# Patient Record
Sex: FEMALE | Race: ASIAN | NOT HISPANIC OR LATINO
[De-identification: names, ages, dates, MRNs, and addresses within clinical notes are randomized per-mention and may not be internally consistent; named-entity substitution may affect disease eponyms.]

---

## 2017-03-22 ENCOUNTER — APPOINTMENT (OUTPATIENT)
Dept: UROLOGY | Facility: CLINIC | Age: 57
End: 2017-03-22

## 2017-04-21 ENCOUNTER — APPOINTMENT (OUTPATIENT)
Dept: ULTRASOUND IMAGING | Facility: IMAGING CENTER | Age: 57
End: 2017-04-21

## 2017-04-21 ENCOUNTER — APPOINTMENT (OUTPATIENT)
Dept: UROLOGY | Facility: CLINIC | Age: 57
End: 2017-04-21

## 2017-04-21 VITALS
DIASTOLIC BLOOD PRESSURE: 80 MMHG | BODY MASS INDEX: 33.38 KG/M2 | RESPIRATION RATE: 17 BRPM | SYSTOLIC BLOOD PRESSURE: 128 MMHG | WEIGHT: 170 LBS | HEART RATE: 83 BPM | TEMPERATURE: 98.3 F | HEIGHT: 60 IN

## 2017-09-29 ENCOUNTER — EMERGENCY (EMERGENCY)
Facility: HOSPITAL | Age: 57
LOS: 1 days | Discharge: HOME | End: 2017-09-29
Attending: EMERGENCY MEDICINE | Admitting: EMERGENCY MEDICINE
Payer: COMMERCIAL

## 2017-09-29 VITALS
HEART RATE: 119 BPM | WEIGHT: 158.07 LBS | OXYGEN SATURATION: 99 % | TEMPERATURE: 100 F | HEIGHT: 66 IN | SYSTOLIC BLOOD PRESSURE: 158 MMHG | RESPIRATION RATE: 20 BRPM | DIASTOLIC BLOOD PRESSURE: 75 MMHG

## 2017-09-29 VITALS
OXYGEN SATURATION: 99 % | DIASTOLIC BLOOD PRESSURE: 69 MMHG | SYSTOLIC BLOOD PRESSURE: 122 MMHG | TEMPERATURE: 98 F | HEART RATE: 81 BPM | RESPIRATION RATE: 16 BRPM

## 2017-09-29 DIAGNOSIS — Z98.51 TUBAL LIGATION STATUS: Chronic | ICD-10-CM

## 2017-09-29 DIAGNOSIS — Z98.89 OTHER SPECIFIED POSTPROCEDURAL STATES: Chronic | ICD-10-CM

## 2017-09-29 LAB
ALBUMIN SERPL ELPH-MCNC: 4.3 G/DL — SIGNIFICANT CHANGE UP (ref 3.3–5)
ALP SERPL-CCNC: 81 U/L — SIGNIFICANT CHANGE UP (ref 30–120)
ALT FLD-CCNC: 29 U/L DA — SIGNIFICANT CHANGE UP (ref 10–60)
ANION GAP SERPL CALC-SCNC: 12 MMOL/L — SIGNIFICANT CHANGE UP (ref 5–17)
APPEARANCE UR: CLEAR — SIGNIFICANT CHANGE UP
AST SERPL-CCNC: 25 U/L — SIGNIFICANT CHANGE UP (ref 10–40)
BASOPHILS # BLD AUTO: 0.1 K/UL — SIGNIFICANT CHANGE UP (ref 0–0.2)
BASOPHILS NFR BLD AUTO: 1.2 % — SIGNIFICANT CHANGE UP (ref 0–2)
BILIRUB SERPL-MCNC: 0.3 MG/DL — SIGNIFICANT CHANGE UP (ref 0.2–1.2)
BILIRUB UR-MCNC: NEGATIVE — SIGNIFICANT CHANGE UP
BUN SERPL-MCNC: 9 MG/DL — SIGNIFICANT CHANGE UP (ref 7–23)
CALCIUM SERPL-MCNC: 10.2 MG/DL — SIGNIFICANT CHANGE UP (ref 8.4–10.5)
CHLORIDE SERPL-SCNC: 98 MMOL/L — SIGNIFICANT CHANGE UP (ref 96–108)
CK MB BLD-MCNC: 2.2 % — SIGNIFICANT CHANGE UP (ref 0–3.5)
CK MB BLD-MCNC: 2.4 % — SIGNIFICANT CHANGE UP (ref 0–3.5)
CK MB CFR SERPL CALC: 2.6 NG/ML — SIGNIFICANT CHANGE UP (ref 0–3.6)
CK MB CFR SERPL CALC: 4 NG/ML — HIGH (ref 0–3.6)
CK SERPL-CCNC: 117 U/L — SIGNIFICANT CHANGE UP (ref 26–192)
CK SERPL-CCNC: 170 U/L — SIGNIFICANT CHANGE UP (ref 26–192)
CO2 SERPL-SCNC: 26 MMOL/L — SIGNIFICANT CHANGE UP (ref 22–31)
COLOR SPEC: YELLOW — SIGNIFICANT CHANGE UP
CREAT SERPL-MCNC: 0.75 MG/DL — SIGNIFICANT CHANGE UP (ref 0.5–1.3)
D DIMER BLD IA.RAPID-MCNC: <150 NG/ML DDU — SIGNIFICANT CHANGE UP
DIFF PNL FLD: NEGATIVE — SIGNIFICANT CHANGE UP
EOSINOPHIL # BLD AUTO: 0.6 K/UL — HIGH (ref 0–0.5)
EOSINOPHIL NFR BLD AUTO: 4.8 % — SIGNIFICANT CHANGE UP (ref 0–6)
ERYTHROCYTE [SEDIMENTATION RATE] IN BLOOD: 13 MM/HR — SIGNIFICANT CHANGE UP (ref 0–20)
FLUAV SPEC QL CULT: NEGATIVE — SIGNIFICANT CHANGE UP
FLUBV AG SPEC QL IA: NEGATIVE — SIGNIFICANT CHANGE UP
GLUCOSE SERPL-MCNC: 161 MG/DL — HIGH (ref 70–99)
GLUCOSE UR QL: NEGATIVE MG/DL — SIGNIFICANT CHANGE UP
HCT VFR BLD CALC: 41.6 % — SIGNIFICANT CHANGE UP (ref 34.5–45)
HGB BLD-MCNC: 13.7 G/DL — SIGNIFICANT CHANGE UP (ref 11.5–15.5)
KETONES UR-MCNC: NEGATIVE — SIGNIFICANT CHANGE UP
LACTATE SERPL-SCNC: 1.8 MMOL/L — SIGNIFICANT CHANGE UP (ref 0.7–2)
LEUKOCYTE ESTERASE UR-ACNC: NEGATIVE — SIGNIFICANT CHANGE UP
LYMPHOCYTES # BLD AUTO: 2.5 K/UL — SIGNIFICANT CHANGE UP (ref 1–3.3)
LYMPHOCYTES # BLD AUTO: 20.6 % — SIGNIFICANT CHANGE UP (ref 13–44)
MCHC RBC-ENTMCNC: 28.9 PG — SIGNIFICANT CHANGE UP (ref 27–34)
MCHC RBC-ENTMCNC: 33 GM/DL — SIGNIFICANT CHANGE UP (ref 32–36)
MCV RBC AUTO: 87.7 FL — SIGNIFICANT CHANGE UP (ref 80–100)
MONOCYTES # BLD AUTO: 0.7 K/UL — SIGNIFICANT CHANGE UP (ref 0–0.9)
MONOCYTES NFR BLD AUTO: 5.9 % — SIGNIFICANT CHANGE UP (ref 2–14)
NEUTROPHILS # BLD AUTO: 8.3 K/UL — HIGH (ref 1.8–7.4)
NEUTROPHILS NFR BLD AUTO: 67.5 % — SIGNIFICANT CHANGE UP (ref 43–77)
NITRITE UR-MCNC: NEGATIVE — SIGNIFICANT CHANGE UP
PH UR: 7 — SIGNIFICANT CHANGE UP (ref 5–8)
PLATELET # BLD AUTO: 321 K/UL — SIGNIFICANT CHANGE UP (ref 150–400)
POTASSIUM SERPL-MCNC: 3.4 MMOL/L — LOW (ref 3.5–5.3)
POTASSIUM SERPL-SCNC: 3.4 MMOL/L — LOW (ref 3.5–5.3)
PROT SERPL-MCNC: 8.2 G/DL — SIGNIFICANT CHANGE UP (ref 6–8.3)
PROT UR-MCNC: NEGATIVE MG/DL — SIGNIFICANT CHANGE UP
RBC # BLD: 4.75 M/UL — SIGNIFICANT CHANGE UP (ref 3.8–5.2)
RBC # FLD: 12.6 % — SIGNIFICANT CHANGE UP (ref 10.3–14.5)
SODIUM SERPL-SCNC: 136 MMOL/L — SIGNIFICANT CHANGE UP (ref 135–145)
SP GR SPEC: 1 — LOW (ref 1.01–1.02)
TROPONIN I SERPL-MCNC: 0 NG/ML — LOW (ref 0.02–0.06)
TROPONIN I SERPL-MCNC: 0 NG/ML — LOW (ref 0.02–0.06)
UROBILINOGEN FLD QL: NEGATIVE MG/DL — SIGNIFICANT CHANGE UP
WBC # BLD: 12.4 K/UL — HIGH (ref 3.8–10.5)
WBC # FLD AUTO: 12.4 K/UL — HIGH (ref 3.8–10.5)

## 2017-09-29 PROCEDURE — 87486 CHLMYD PNEUM DNA AMP PROBE: CPT

## 2017-09-29 PROCEDURE — 93005 ELECTROCARDIOGRAM TRACING: CPT

## 2017-09-29 PROCEDURE — 82553 CREATINE MB FRACTION: CPT

## 2017-09-29 PROCEDURE — 85652 RBC SED RATE AUTOMATED: CPT

## 2017-09-29 PROCEDURE — 87400 INFLUENZA A/B EACH AG IA: CPT

## 2017-09-29 PROCEDURE — 85027 COMPLETE CBC AUTOMATED: CPT

## 2017-09-29 PROCEDURE — 87040 BLOOD CULTURE FOR BACTERIA: CPT

## 2017-09-29 PROCEDURE — 99285 EMERGENCY DEPT VISIT HI MDM: CPT

## 2017-09-29 PROCEDURE — 85379 FIBRIN DEGRADATION QUANT: CPT

## 2017-09-29 PROCEDURE — 87186 SC STD MICRODIL/AGAR DIL: CPT

## 2017-09-29 PROCEDURE — 84484 ASSAY OF TROPONIN QUANT: CPT

## 2017-09-29 PROCEDURE — 93010 ELECTROCARDIOGRAM REPORT: CPT

## 2017-09-29 PROCEDURE — 87798 DETECT AGENT NOS DNA AMP: CPT

## 2017-09-29 PROCEDURE — 99284 EMERGENCY DEPT VISIT MOD MDM: CPT | Mod: 25

## 2017-09-29 PROCEDURE — 87633 RESP VIRUS 12-25 TARGETS: CPT

## 2017-09-29 PROCEDURE — 71020: CPT | Mod: 26

## 2017-09-29 PROCEDURE — 87086 URINE CULTURE/COLONY COUNT: CPT

## 2017-09-29 PROCEDURE — 83605 ASSAY OF LACTIC ACID: CPT

## 2017-09-29 PROCEDURE — 80053 COMPREHEN METABOLIC PANEL: CPT

## 2017-09-29 PROCEDURE — 87581 M.PNEUMON DNA AMP PROBE: CPT

## 2017-09-29 PROCEDURE — 81003 URINALYSIS AUTO W/O SCOPE: CPT

## 2017-09-29 PROCEDURE — 71046 X-RAY EXAM CHEST 2 VIEWS: CPT

## 2017-09-29 PROCEDURE — 82550 ASSAY OF CK (CPK): CPT

## 2017-09-29 RX ORDER — SODIUM CHLORIDE 9 MG/ML
1000 INJECTION INTRAMUSCULAR; INTRAVENOUS; SUBCUTANEOUS ONCE
Qty: 0 | Refills: 0 | Status: COMPLETED | OUTPATIENT
Start: 2017-09-29 | End: 2017-09-29

## 2017-09-29 RX ORDER — SODIUM CHLORIDE 9 MG/ML
3 INJECTION INTRAMUSCULAR; INTRAVENOUS; SUBCUTANEOUS ONCE
Qty: 0 | Refills: 0 | Status: COMPLETED | OUTPATIENT
Start: 2017-09-29 | End: 2017-09-29

## 2017-09-29 RX ORDER — ACETAMINOPHEN 500 MG
650 TABLET ORAL ONCE
Qty: 0 | Refills: 0 | Status: COMPLETED | OUTPATIENT
Start: 2017-09-29 | End: 2017-09-29

## 2017-09-29 RX ADMIN — Medication 650 MILLIGRAM(S): at 17:59

## 2017-09-29 RX ADMIN — SODIUM CHLORIDE 1000 MILLILITER(S): 9 INJECTION INTRAMUSCULAR; INTRAVENOUS; SUBCUTANEOUS at 18:03

## 2017-09-29 RX ADMIN — SODIUM CHLORIDE 3 MILLILITER(S): 9 INJECTION INTRAMUSCULAR; INTRAVENOUS; SUBCUTANEOUS at 16:41

## 2017-09-29 RX ADMIN — SODIUM CHLORIDE 1000 MILLILITER(S): 9 INJECTION INTRAMUSCULAR; INTRAVENOUS; SUBCUTANEOUS at 19:04

## 2017-09-29 NOTE — ED ADULT NURSE REASSESSMENT NOTE - NS ED NURSE REASSESS COMMENT FT1
Pt is awake, alert and conversant with clear speech, reports feeling better since in the ED. Pt is on CM in NSR, NS is infusing. Pt is awaiting cards consult and remaining lab work. Pt is breathing without any difficulty, respirations are even and unlabored, lungs are CTA, HR is regular and without ectopy, +peripheral pulses, no edema, skin is hot to touch, abdomen is obese and soft, +BS.

## 2017-09-29 NOTE — ED PROVIDER NOTE - PROGRESS NOTE DETAILS
Pt seen by Dr. Land, cardiologist in ED who advised to get second set of cardiac markers at 9pm and re-assess; if neg, d/c as viral pathology.   Pt was examined by ED attending, Dr. Galeano who agreed with plan. Pt seen by Dr. Land, cardiologist in ED who advised to get second set of cardiac markers at 9pm and re-assess; if neg, d/c as viral SYNDOME.   Pt was examined by ED attending, Dr. Galeano who agreed with plan. Pt stable and labs/imaging wnl in ED. both sets trops neg, ddimer and cxr wnl. Cardiology cleared pt. Will d/c home and f/u cardiology and pmd.

## 2017-09-29 NOTE — ED PROVIDER NOTE - PMH
Asthma    Calculus of kidney    DM (diabetes mellitus)    GERD (gastroesophageal reflux disease)    Herpes simplex    HLD (hyperlipidemia)    Hydronephrosis  right  Hydronephrosis, unspecified hydronephrosis type    Hypertension    Impaired fasting glucose    Thyroid activity decreased

## 2017-09-29 NOTE — CONSULT NOTE ADULT - ASSESSMENT
The patient is a 57 year old female with a history of HTN, DM who presents with atypical chest pain.    Plan:   - ECG with no evidence of ischemia/infarction. Low voltage likely secondary to body habitus (old finding on ECG).  - First troponin 0.000. Rule out acute MI with two sets of cardiac enzymes  - D-dimer negative  - CXR clear  - If above work-up negative, patient may be discharged from cardiac perspective and follow-up for visit

## 2017-09-29 NOTE — ED ADULT NURSE NOTE - CHPI ED SYMPTOMS NEG
no nausea/no chills/no back pain/no shortness of breath/no diaphoresis/no syncope/no vomiting/no chest pain/no cough/no fever/no dizziness

## 2017-09-29 NOTE — ED PROVIDER NOTE - ATTENDING CONTRIBUTION TO CARE
pt is a 57 f whose pmd is dr guerita booker has hx of htn obesity kidney surgery. she isneover smoker drinker has hx of intermittent chest pain and prssure now more steady over past 3 days worst this am.. pt bib ems after daughter arrived home from work this afternoon. pain pressure tightness in left center of chest non radiating no sob no no ausea no cough no trauma  no travel no calf pain or swelling  exam reveals obese f nad normal heart lungs ext abd chest back skin neuro adeel  ro pe ro pn ro acs or othe rinfectious in febrile pt with tachycardia

## 2017-09-29 NOTE — CONSULT NOTE ADULT - SUBJECTIVE AND OBJECTIVE BOX
History of Present Illness: The patient is a 57 year old female with a history of HTN, DM who presents with chest pain starting at 2 pm. Pain is left-sided, tightness, non-radiating, non-pleuritic, non-exertional. There is associated shortness of breath and some chills earlier. No cough. She had similar symptoms about 3 years ago and 1 year ago (which was enterovirus positive at that time). She had a nuclear stress test three years ago, which was reportedly normal. Echocardiogram last year with normal LV systolic function, no significant valve issues.    Past Medical/Surgical History:  HTN, DM    Medications:  Amlodipine  HCTZ-triamterene  Metoprolol succinate  Aspirin    Family History: Father - MI    Social History: No tobacco, alcohol or drug use    Review of Systems:  General: No fevers, chills, weight loss or gain  Skin: No rashes, color changes  Cardiovascular: No chest pain, orthopnea  Respiratory: No shortness of breath, cough  Gastrointestinal: No nausea, abdominal pain  Genitourinary: No incontinence, pain with urination  Musculoskeletal: No pain, swelling, decreased range of motion  Neurological: No headache, weakness  Psychiatric: No depression, anxiety  Endocrine: No weight loss or gain, increased thirst  All other systems are comprehensively negative.    Physical Exam:  Vitals:        Vital Signs Last 24 Hrs  T(C): 36.9 (29 Sep 2017 19:07), Max: 37.9 (29 Sep 2017 16:23)  T(F): 98.5 (29 Sep 2017 19:07), Max: 100.2 (29 Sep 2017 16:23)  HR: 80 (29 Sep 2017 19:07) (80 - 119)  BP: 120/64 (29 Sep 2017 19:07) (120/64 - 158/75)  BP(mean): --  RR: 16 (29 Sep 2017 18:04) (16 - 20)  SpO2: 98% (29 Sep 2017 19:07) (98% - 99%)  General: NAD  HEENT: MMM  Neck: No JVD, no carotid bruit  Lungs: CTAB  CV: RRR, nl S1/S2, no M/R/G  Abdomen: S/NT/ND, +BS  Extremities: No LE edema, no cyanosis  Neuro: AAOx3, non-focal  Skin: No rash    Labs:                        13.7   12.4  )-----------( 321      ( 29 Sep 2017 17:00 )             41.6     09-29    136  |  98  |  9   ----------------------------<  161<H>  3.4<L>   |  26  |  0.75    Ca    10.2      29 Sep 2017 17:00    TPro  8.2  /  Alb  4.3  /  TBili  0.3  /  DBili  x   /  AST  25  /  ALT  29  /  AlkPhos  81  09-29    CARDIAC MARKERS ( 29 Sep 2017 17:00 )  .000 ng/mL / x     / x     / x     / x          ECG: Sinus tachycardia, normal axis, low voltage (largely unchanged from prior)

## 2017-09-29 NOTE — ED PROVIDER NOTE - OBJECTIVE STATEMENT
56 y/o F with hx of HTN, DM presents with c/o L side chest pressure x 4 days. Pt states that symptoms are worsening today. Pt states that she was given nitro 2 sprays and 4 tabs of aspirin via EMS on arrival. Denies fever, chills, n/v/d, cough, runny nose, SOB, abd pain, back pain, recent travel/immobilization/surgeries, palpitations, leg pain/swelling, dizziness, smoking, cocaine use, or other symptoms. 56 y/o F with hx of HTN, DM presents with c/o L side chest pressure x 4 days. Pt states that symptoms are worsening today. Pt states that she was given nitro 2 sprays and 4 tabs of aspirin via EMS on arrival. States that she had some associated shortness of breath and chills earlier. Denies pleuritic or exertional pain. Denies fever,  n/v/d, cough, runny nose, abd pain, back pain, recent travel/immobilization/surgeries, palpitations, leg pain/swelling, dizziness, smoking, cocaine use, or other symptoms.

## 2017-09-29 NOTE — ED PROVIDER NOTE - CARE PLAN
Principal Discharge DX:	Chest pain Principal Discharge DX:	Chest pain  Secondary Diagnosis:	Viral syndrome

## 2017-09-30 LAB — RAPID RVP RESULT: SIGNIFICANT CHANGE UP

## 2017-10-01 LAB
-  AMPICILLIN: SIGNIFICANT CHANGE UP
-  CIPROFLOXACIN: SIGNIFICANT CHANGE UP
-  NITROFURANTOIN: SIGNIFICANT CHANGE UP
-  TETRACYCLINE: SIGNIFICANT CHANGE UP
-  VANCOMYCIN: SIGNIFICANT CHANGE UP
CULTURE RESULTS: SIGNIFICANT CHANGE UP
METHOD TYPE: SIGNIFICANT CHANGE UP
ORGANISM # SPEC MICROSCOPIC CNT: SIGNIFICANT CHANGE UP
ORGANISM # SPEC MICROSCOPIC CNT: SIGNIFICANT CHANGE UP
SPECIMEN SOURCE: SIGNIFICANT CHANGE UP

## 2017-10-05 LAB
CULTURE RESULTS: SIGNIFICANT CHANGE UP
CULTURE RESULTS: SIGNIFICANT CHANGE UP
SPECIMEN SOURCE: SIGNIFICANT CHANGE UP
SPECIMEN SOURCE: SIGNIFICANT CHANGE UP

## 2017-11-22 ENCOUNTER — APPOINTMENT (OUTPATIENT)
Dept: UROLOGY | Facility: CLINIC | Age: 57
End: 2017-11-22
Payer: COMMERCIAL

## 2017-11-22 ENCOUNTER — OUTPATIENT (OUTPATIENT)
Dept: OUTPATIENT SERVICES | Facility: HOSPITAL | Age: 57
LOS: 1 days | End: 2017-11-22
Payer: COMMERCIAL

## 2017-11-22 VITALS
DIASTOLIC BLOOD PRESSURE: 76 MMHG | HEART RATE: 79 BPM | RESPIRATION RATE: 17 BRPM | SYSTOLIC BLOOD PRESSURE: 112 MMHG | TEMPERATURE: 97.9 F

## 2017-11-22 DIAGNOSIS — Z98.89 OTHER SPECIFIED POSTPROCEDURAL STATES: Chronic | ICD-10-CM

## 2017-11-22 DIAGNOSIS — R35.0 FREQUENCY OF MICTURITION: ICD-10-CM

## 2017-11-22 DIAGNOSIS — Z98.51 TUBAL LIGATION STATUS: Chronic | ICD-10-CM

## 2017-11-22 PROCEDURE — 99213 OFFICE O/P EST LOW 20 MIN: CPT | Mod: 25

## 2017-11-22 PROCEDURE — 76775 US EXAM ABDO BACK WALL LIM: CPT | Mod: 26

## 2017-11-22 PROCEDURE — 76775 US EXAM ABDO BACK WALL LIM: CPT

## 2017-11-27 DIAGNOSIS — R82.99 OTHER ABNORMAL FINDINGS IN URINE: ICD-10-CM

## 2017-11-27 DIAGNOSIS — N20.0 CALCULUS OF KIDNEY: ICD-10-CM

## 2017-11-27 DIAGNOSIS — N13.30 UNSPECIFIED HYDRONEPHROSIS: ICD-10-CM

## 2017-11-29 ENCOUNTER — APPOINTMENT (OUTPATIENT)
Dept: UROLOGY | Facility: CLINIC | Age: 57
End: 2017-11-29

## 2017-12-01 ENCOUNTER — APPOINTMENT (OUTPATIENT)
Dept: CT IMAGING | Facility: CLINIC | Age: 57
End: 2017-12-01

## 2018-03-09 ENCOUNTER — EMERGENCY (EMERGENCY)
Facility: HOSPITAL | Age: 58
LOS: 1 days | Discharge: HOME | End: 2018-03-09
Attending: EMERGENCY MEDICINE | Admitting: EMERGENCY MEDICINE
Payer: COMMERCIAL

## 2018-03-09 VITALS
OXYGEN SATURATION: 98 % | RESPIRATION RATE: 18 BRPM | TEMPERATURE: 98 F | HEIGHT: 66 IN | SYSTOLIC BLOOD PRESSURE: 161 MMHG | HEART RATE: 121 BPM | WEIGHT: 169.98 LBS | DIASTOLIC BLOOD PRESSURE: 80 MMHG

## 2018-03-09 DIAGNOSIS — Z98.51 TUBAL LIGATION STATUS: Chronic | ICD-10-CM

## 2018-03-09 DIAGNOSIS — Z98.89 OTHER SPECIFIED POSTPROCEDURAL STATES: Chronic | ICD-10-CM

## 2018-03-09 LAB
ALBUMIN SERPL ELPH-MCNC: 4.2 G/DL — SIGNIFICANT CHANGE UP (ref 3.3–5)
ALP SERPL-CCNC: 83 U/L — SIGNIFICANT CHANGE UP (ref 40–120)
ALT FLD-CCNC: 27 U/L — SIGNIFICANT CHANGE UP (ref 12–78)
ANION GAP SERPL CALC-SCNC: 9 MMOL/L — SIGNIFICANT CHANGE UP (ref 5–17)
APPEARANCE UR: ABNORMAL
APTT BLD: 29.8 SEC — SIGNIFICANT CHANGE UP (ref 27.5–37.4)
AST SERPL-CCNC: 25 U/L — SIGNIFICANT CHANGE UP (ref 15–37)
BACTERIA # UR AUTO: ABNORMAL
BASOPHILS # BLD AUTO: 0.1 K/UL — SIGNIFICANT CHANGE UP (ref 0–0.2)
BASOPHILS NFR BLD AUTO: 1 % — SIGNIFICANT CHANGE UP (ref 0–2)
BILIRUB SERPL-MCNC: 0.2 MG/DL — SIGNIFICANT CHANGE UP (ref 0.2–1.2)
BILIRUB UR-MCNC: NEGATIVE — SIGNIFICANT CHANGE UP
BUN SERPL-MCNC: 14 MG/DL — SIGNIFICANT CHANGE UP (ref 7–23)
CALCIUM SERPL-MCNC: 9.3 MG/DL — SIGNIFICANT CHANGE UP (ref 8.5–10.1)
CHLORIDE SERPL-SCNC: 102 MMOL/L — SIGNIFICANT CHANGE UP (ref 96–108)
CK MB BLD-MCNC: 1.9 % — SIGNIFICANT CHANGE UP (ref 0–3.5)
CK MB CFR SERPL CALC: 3.4 NG/ML — SIGNIFICANT CHANGE UP (ref 0–3.6)
CK SERPL-CCNC: 182 U/L — SIGNIFICANT CHANGE UP (ref 26–192)
CO2 SERPL-SCNC: 26 MMOL/L — SIGNIFICANT CHANGE UP (ref 22–31)
COLOR SPEC: YELLOW — SIGNIFICANT CHANGE UP
CREAT SERPL-MCNC: 0.75 MG/DL — SIGNIFICANT CHANGE UP (ref 0.5–1.3)
DIFF PNL FLD: ABNORMAL
EOSINOPHIL # BLD AUTO: 0.6 K/UL — HIGH (ref 0–0.5)
EOSINOPHIL NFR BLD AUTO: 4.4 % — SIGNIFICANT CHANGE UP (ref 0–6)
EPI CELLS # UR: SIGNIFICANT CHANGE UP
GLUCOSE SERPL-MCNC: 190 MG/DL — HIGH (ref 70–99)
GLUCOSE UR QL: 50 MG/DL
HCT VFR BLD CALC: 41.6 % — SIGNIFICANT CHANGE UP (ref 34.5–45)
HGB BLD-MCNC: 14 G/DL — SIGNIFICANT CHANGE UP (ref 11.5–15.5)
INR BLD: 0.96 RATIO — SIGNIFICANT CHANGE UP (ref 0.88–1.16)
KETONES UR-MCNC: NEGATIVE — SIGNIFICANT CHANGE UP
LEUKOCYTE ESTERASE UR-ACNC: ABNORMAL
LYMPHOCYTES # BLD AUTO: 21.8 % — SIGNIFICANT CHANGE UP (ref 13–44)
LYMPHOCYTES # BLD AUTO: 3 K/UL — SIGNIFICANT CHANGE UP (ref 1–3.3)
MCHC RBC-ENTMCNC: 28.9 PG — SIGNIFICANT CHANGE UP (ref 27–34)
MCHC RBC-ENTMCNC: 33.8 GM/DL — SIGNIFICANT CHANGE UP (ref 32–36)
MCV RBC AUTO: 85.5 FL — SIGNIFICANT CHANGE UP (ref 80–100)
MONOCYTES # BLD AUTO: 0.9 K/UL — SIGNIFICANT CHANGE UP (ref 0–0.9)
MONOCYTES NFR BLD AUTO: 6.5 % — SIGNIFICANT CHANGE UP (ref 1–9)
NEUTROPHILS # BLD AUTO: 9 K/UL — HIGH (ref 1.8–7.4)
NEUTROPHILS NFR BLD AUTO: 66.3 % — SIGNIFICANT CHANGE UP (ref 43–77)
NITRITE UR-MCNC: NEGATIVE — SIGNIFICANT CHANGE UP
PH UR: 6.5 — SIGNIFICANT CHANGE UP (ref 5–8)
PLATELET # BLD AUTO: 370 K/UL — SIGNIFICANT CHANGE UP (ref 150–400)
POTASSIUM SERPL-MCNC: 3.8 MMOL/L — SIGNIFICANT CHANGE UP (ref 3.5–5.3)
POTASSIUM SERPL-SCNC: 3.8 MMOL/L — SIGNIFICANT CHANGE UP (ref 3.5–5.3)
PROT SERPL-MCNC: 8.4 G/DL — HIGH (ref 6–8.3)
PROT UR-MCNC: NEGATIVE — SIGNIFICANT CHANGE UP
PROTHROM AB SERPL-ACNC: 10.5 SEC — SIGNIFICANT CHANGE UP (ref 9.8–12.7)
RBC # BLD: 4.86 M/UL — SIGNIFICANT CHANGE UP (ref 3.8–5.2)
RBC # FLD: 13 % — SIGNIFICANT CHANGE UP (ref 10.3–14.5)
RBC CASTS # UR COMP ASSIST: ABNORMAL /HPF (ref 0–4)
SODIUM SERPL-SCNC: 137 MMOL/L — SIGNIFICANT CHANGE UP (ref 135–145)
SP GR SPEC: 1 — LOW (ref 1.01–1.02)
TROPONIN I SERPL-MCNC: <.015 NG/ML — SIGNIFICANT CHANGE UP (ref 0.01–0.04)
UROBILINOGEN FLD QL: NEGATIVE — SIGNIFICANT CHANGE UP
WBC # BLD: 13.6 K/UL — HIGH (ref 3.8–10.5)
WBC # FLD AUTO: 13.6 K/UL — HIGH (ref 3.8–10.5)
WBC UR QL: SIGNIFICANT CHANGE UP

## 2018-03-09 PROCEDURE — 71045 X-RAY EXAM CHEST 1 VIEW: CPT | Mod: 26

## 2018-03-09 PROCEDURE — 99285 EMERGENCY DEPT VISIT HI MDM: CPT

## 2018-03-09 RX ORDER — SODIUM CHLORIDE 9 MG/ML
1000 INJECTION INTRAMUSCULAR; INTRAVENOUS; SUBCUTANEOUS ONCE
Qty: 0 | Refills: 0 | Status: COMPLETED | OUTPATIENT
Start: 2018-03-09 | End: 2018-03-09

## 2018-03-09 RX ADMIN — SODIUM CHLORIDE 2000 MILLILITER(S): 9 INJECTION INTRAMUSCULAR; INTRAVENOUS; SUBCUTANEOUS at 22:18

## 2018-03-09 NOTE — ED ADULT TRIAGE NOTE - CHIEF COMPLAINT QUOTE
Patient complaining of chest pain to mid-chest area with shortness of breath started half an hour ago while she was sitting on a chair.

## 2018-03-09 NOTE — ED PROVIDER NOTE - OBJECTIVE STATEMENT
Pt is a 57 yo female hx dm. pt tonight at 5pm began with racing palpitations and feels hot. mild dizziness and feels thirsty. no cp, sob, f/c, cough, n/v/d. pt took norvasc and baby asa without relief. pt currently with continued sx.  pt denies prior similar sx. pt feels mildly weak all over

## 2018-03-10 VITALS
OXYGEN SATURATION: 100 % | HEART RATE: 75 BPM | SYSTOLIC BLOOD PRESSURE: 111 MMHG | DIASTOLIC BLOOD PRESSURE: 79 MMHG | TEMPERATURE: 98 F | RESPIRATION RATE: 15 BRPM

## 2018-03-10 LAB — TROPONIN I SERPL-MCNC: <.015 NG/ML — SIGNIFICANT CHANGE UP (ref 0.01–0.04)

## 2018-03-10 PROCEDURE — 83690 ASSAY OF LIPASE: CPT

## 2018-03-10 PROCEDURE — 85027 COMPLETE CBC AUTOMATED: CPT

## 2018-03-10 PROCEDURE — 82553 CREATINE MB FRACTION: CPT

## 2018-03-10 PROCEDURE — 82550 ASSAY OF CK (CPK): CPT

## 2018-03-10 PROCEDURE — 87086 URINE CULTURE/COLONY COUNT: CPT

## 2018-03-10 PROCEDURE — 93005 ELECTROCARDIOGRAM TRACING: CPT

## 2018-03-10 PROCEDURE — 99284 EMERGENCY DEPT VISIT MOD MDM: CPT | Mod: 25

## 2018-03-10 PROCEDURE — 85730 THROMBOPLASTIN TIME PARTIAL: CPT

## 2018-03-10 PROCEDURE — 36415 COLL VENOUS BLD VENIPUNCTURE: CPT

## 2018-03-10 PROCEDURE — 96365 THER/PROPH/DIAG IV INF INIT: CPT

## 2018-03-10 PROCEDURE — 84484 ASSAY OF TROPONIN QUANT: CPT

## 2018-03-10 PROCEDURE — 81001 URINALYSIS AUTO W/SCOPE: CPT

## 2018-03-10 PROCEDURE — 71045 X-RAY EXAM CHEST 1 VIEW: CPT

## 2018-03-10 PROCEDURE — 80053 COMPREHEN METABOLIC PANEL: CPT

## 2018-03-10 PROCEDURE — 85610 PROTHROMBIN TIME: CPT

## 2018-03-10 RX ORDER — CIPROFLOXACIN LACTATE 400MG/40ML
1 VIAL (ML) INTRAVENOUS
Qty: 7 | Refills: 0 | OUTPATIENT
Start: 2018-03-10 | End: 2018-03-16

## 2018-03-10 NOTE — ED ADULT NURSE REASSESSMENT NOTE - NS ED NURSE REASSESS COMMENT FT1
Pt denies any pain at this time. Ambulated to bathroom. VSS.  at the bedside. plan of care discussed with patient and .

## 2018-03-11 LAB
CULTURE RESULTS: SIGNIFICANT CHANGE UP
SPECIMEN SOURCE: SIGNIFICANT CHANGE UP

## 2018-04-17 ENCOUNTER — INPATIENT (INPATIENT)
Facility: HOSPITAL | Age: 58
LOS: 2 days | Discharge: HOME | DRG: 194 | End: 2018-04-20
Attending: INTERNAL MEDICINE | Admitting: INTERNAL MEDICINE
Payer: COMMERCIAL

## 2018-04-17 VITALS
TEMPERATURE: 98 F | SYSTOLIC BLOOD PRESSURE: 137 MMHG | HEIGHT: 65 IN | WEIGHT: 169.98 LBS | RESPIRATION RATE: 18 BRPM | OXYGEN SATURATION: 94 % | HEART RATE: 100 BPM | DIASTOLIC BLOOD PRESSURE: 84 MMHG

## 2018-04-17 DIAGNOSIS — Z98.51 TUBAL LIGATION STATUS: Chronic | ICD-10-CM

## 2018-04-17 DIAGNOSIS — J18.9 PNEUMONIA, UNSPECIFIED ORGANISM: ICD-10-CM

## 2018-04-17 DIAGNOSIS — Z98.89 OTHER SPECIFIED POSTPROCEDURAL STATES: Chronic | ICD-10-CM

## 2018-04-17 DIAGNOSIS — R09.89 OTHER SPECIFIED SYMPTOMS AND SIGNS INVOLVING THE CIRCULATORY AND RESPIRATORY SYSTEMS: ICD-10-CM

## 2018-04-17 LAB
ALBUMIN SERPL ELPH-MCNC: 4 G/DL — SIGNIFICANT CHANGE UP (ref 3.3–5)
ALP SERPL-CCNC: 88 U/L — SIGNIFICANT CHANGE UP (ref 30–120)
ALT FLD-CCNC: 32 U/L DA — SIGNIFICANT CHANGE UP (ref 10–60)
ANION GAP SERPL CALC-SCNC: 12 MMOL/L — SIGNIFICANT CHANGE UP (ref 5–17)
APPEARANCE UR: CLEAR — SIGNIFICANT CHANGE UP
APTT BLD: 22.1 SEC — LOW (ref 27.5–37.4)
AST SERPL-CCNC: 36 U/L — SIGNIFICANT CHANGE UP (ref 10–40)
BACTERIA # UR AUTO: ABNORMAL
BASOPHILS # BLD AUTO: 0.1 K/UL — SIGNIFICANT CHANGE UP (ref 0–0.2)
BASOPHILS NFR BLD AUTO: 1.1 % — SIGNIFICANT CHANGE UP (ref 0–2)
BILIRUB SERPL-MCNC: 0.4 MG/DL — SIGNIFICANT CHANGE UP (ref 0.2–1.2)
BILIRUB UR-MCNC: NEGATIVE — SIGNIFICANT CHANGE UP
BUN SERPL-MCNC: 9 MG/DL — SIGNIFICANT CHANGE UP (ref 7–23)
CALCIUM SERPL-MCNC: 9.6 MG/DL — SIGNIFICANT CHANGE UP (ref 8.4–10.5)
CHLORIDE SERPL-SCNC: 102 MMOL/L — SIGNIFICANT CHANGE UP (ref 96–108)
CO2 SERPL-SCNC: 22 MMOL/L — SIGNIFICANT CHANGE UP (ref 22–31)
COLOR SPEC: YELLOW — SIGNIFICANT CHANGE UP
CREAT SERPL-MCNC: 0.63 MG/DL — SIGNIFICANT CHANGE UP (ref 0.5–1.3)
DIFF PNL FLD: NEGATIVE — SIGNIFICANT CHANGE UP
EOSINOPHIL # BLD AUTO: 0.2 K/UL — SIGNIFICANT CHANGE UP (ref 0–0.5)
EOSINOPHIL NFR BLD AUTO: 1.4 % — SIGNIFICANT CHANGE UP (ref 0–6)
EPI CELLS # UR: SIGNIFICANT CHANGE UP
GLUCOSE SERPL-MCNC: 151 MG/DL — HIGH (ref 70–99)
GLUCOSE UR QL: 50 MG/DL
HCT VFR BLD CALC: 42.7 % — SIGNIFICANT CHANGE UP (ref 34.5–45)
HGB BLD-MCNC: 14.4 G/DL — SIGNIFICANT CHANGE UP (ref 11.5–15.5)
HMPV RNA SPEC QL NAA+PROBE: DETECTED
INR BLD: 1.05 RATIO — SIGNIFICANT CHANGE UP (ref 0.88–1.16)
KETONES UR-MCNC: NEGATIVE — SIGNIFICANT CHANGE UP
LACTATE SERPL-SCNC: 2.4 MMOL/L — HIGH (ref 0.7–2)
LACTATE SERPL-SCNC: 5.4 MMOL/L — CRITICAL HIGH (ref 0.7–2)
LEUKOCYTE ESTERASE UR-ACNC: ABNORMAL
LYMPHOCYTES # BLD AUTO: 2.3 K/UL — SIGNIFICANT CHANGE UP (ref 1–3.3)
LYMPHOCYTES # BLD AUTO: 21.3 % — SIGNIFICANT CHANGE UP (ref 13–44)
MCHC RBC-ENTMCNC: 29 PG — SIGNIFICANT CHANGE UP (ref 27–34)
MCHC RBC-ENTMCNC: 33.8 GM/DL — SIGNIFICANT CHANGE UP (ref 32–36)
MCV RBC AUTO: 85.8 FL — SIGNIFICANT CHANGE UP (ref 80–100)
MONOCYTES # BLD AUTO: 0.3 K/UL — SIGNIFICANT CHANGE UP (ref 0–0.9)
MONOCYTES NFR BLD AUTO: 3.2 % — SIGNIFICANT CHANGE UP (ref 2–14)
NEUTROPHILS # BLD AUTO: 7.9 K/UL — HIGH (ref 1.8–7.4)
NEUTROPHILS NFR BLD AUTO: 73 % — SIGNIFICANT CHANGE UP (ref 43–77)
NITRITE UR-MCNC: NEGATIVE — SIGNIFICANT CHANGE UP
PH UR: 8 — SIGNIFICANT CHANGE UP (ref 5–8)
PLATELET # BLD AUTO: 365 K/UL — SIGNIFICANT CHANGE UP (ref 150–400)
POTASSIUM SERPL-MCNC: 4.2 MMOL/L — SIGNIFICANT CHANGE UP (ref 3.5–5.3)
POTASSIUM SERPL-SCNC: 4.2 MMOL/L — SIGNIFICANT CHANGE UP (ref 3.5–5.3)
PROT SERPL-MCNC: 8.3 G/DL — SIGNIFICANT CHANGE UP (ref 6–8.3)
PROT UR-MCNC: NEGATIVE MG/DL — SIGNIFICANT CHANGE UP
PROTHROM AB SERPL-ACNC: 11.5 SEC — SIGNIFICANT CHANGE UP (ref 9.8–12.7)
RAPID RVP RESULT: DETECTED
RBC # BLD: 4.98 M/UL — SIGNIFICANT CHANGE UP (ref 3.8–5.2)
RBC # FLD: 12.6 % — SIGNIFICANT CHANGE UP (ref 10.3–14.5)
RBC CASTS # UR COMP ASSIST: SIGNIFICANT CHANGE UP /HPF (ref 0–4)
SODIUM SERPL-SCNC: 136 MMOL/L — SIGNIFICANT CHANGE UP (ref 135–145)
SP GR SPEC: 1.01 — SIGNIFICANT CHANGE UP (ref 1.01–1.02)
UROBILINOGEN FLD QL: NEGATIVE MG/DL — SIGNIFICANT CHANGE UP
WBC # BLD: 10.8 K/UL — HIGH (ref 3.8–10.5)
WBC # FLD AUTO: 10.8 K/UL — HIGH (ref 3.8–10.5)
WBC UR QL: SIGNIFICANT CHANGE UP

## 2018-04-17 PROCEDURE — 99285 EMERGENCY DEPT VISIT HI MDM: CPT

## 2018-04-17 PROCEDURE — 71046 X-RAY EXAM CHEST 2 VIEWS: CPT | Mod: 26

## 2018-04-17 PROCEDURE — 99223 1ST HOSP IP/OBS HIGH 75: CPT | Mod: AI

## 2018-04-17 PROCEDURE — 93010 ELECTROCARDIOGRAM REPORT: CPT

## 2018-04-17 PROCEDURE — 71250 CT THORAX DX C-: CPT | Mod: 26

## 2018-04-17 RX ORDER — ALBUTEROL 90 UG/1
2.5 AEROSOL, METERED ORAL EVERY 8 HOURS
Qty: 0 | Refills: 0 | Status: DISCONTINUED | OUTPATIENT
Start: 2018-04-17 | End: 2018-04-20

## 2018-04-17 RX ORDER — DEXTROSE 50 % IN WATER 50 %
25 SYRINGE (ML) INTRAVENOUS ONCE
Qty: 0 | Refills: 0 | Status: DISCONTINUED | OUTPATIENT
Start: 2018-04-17 | End: 2018-04-20

## 2018-04-17 RX ORDER — INSULIN LISPRO 100/ML
VIAL (ML) SUBCUTANEOUS
Qty: 0 | Refills: 0 | Status: DISCONTINUED | OUTPATIENT
Start: 2018-04-17 | End: 2018-04-20

## 2018-04-17 RX ORDER — DEXTROSE 50 % IN WATER 50 %
1 SYRINGE (ML) INTRAVENOUS ONCE
Qty: 0 | Refills: 0 | Status: DISCONTINUED | OUTPATIENT
Start: 2018-04-17 | End: 2018-04-20

## 2018-04-17 RX ORDER — AZITHROMYCIN 500 MG/1
500 TABLET, FILM COATED ORAL ONCE
Qty: 0 | Refills: 0 | Status: COMPLETED | OUTPATIENT
Start: 2018-04-17 | End: 2018-04-17

## 2018-04-17 RX ORDER — CEFTRIAXONE 500 MG/1
1 INJECTION, POWDER, FOR SOLUTION INTRAMUSCULAR; INTRAVENOUS ONCE
Qty: 0 | Refills: 0 | Status: COMPLETED | OUTPATIENT
Start: 2018-04-17 | End: 2018-04-17

## 2018-04-17 RX ORDER — SODIUM CHLORIDE 9 MG/ML
3 INJECTION INTRAMUSCULAR; INTRAVENOUS; SUBCUTANEOUS ONCE
Qty: 0 | Refills: 0 | Status: DISCONTINUED | OUTPATIENT
Start: 2018-04-17 | End: 2018-04-17

## 2018-04-17 RX ORDER — MONTELUKAST 4 MG/1
10 TABLET, CHEWABLE ORAL AT BEDTIME
Qty: 0 | Refills: 0 | Status: DISCONTINUED | OUTPATIENT
Start: 2018-04-17 | End: 2018-04-20

## 2018-04-17 RX ORDER — SODIUM CHLORIDE 9 MG/ML
1000 INJECTION INTRAMUSCULAR; INTRAVENOUS; SUBCUTANEOUS ONCE
Qty: 0 | Refills: 0 | Status: COMPLETED | OUTPATIENT
Start: 2018-04-17 | End: 2018-04-17

## 2018-04-17 RX ORDER — GLUCAGON INJECTION, SOLUTION 0.5 MG/.1ML
1 INJECTION, SOLUTION SUBCUTANEOUS ONCE
Qty: 0 | Refills: 0 | Status: DISCONTINUED | OUTPATIENT
Start: 2018-04-17 | End: 2018-04-20

## 2018-04-17 RX ORDER — AMLODIPINE BESYLATE 2.5 MG/1
5 TABLET ORAL DAILY
Qty: 0 | Refills: 0 | Status: DISCONTINUED | OUTPATIENT
Start: 2018-04-17 | End: 2018-04-18

## 2018-04-17 RX ORDER — SODIUM CHLORIDE 9 MG/ML
1000 INJECTION, SOLUTION INTRAVENOUS
Qty: 0 | Refills: 0 | Status: DISCONTINUED | OUTPATIENT
Start: 2018-04-17 | End: 2018-04-20

## 2018-04-17 RX ORDER — IPRATROPIUM/ALBUTEROL SULFATE 18-103MCG
3 AEROSOL WITH ADAPTER (GRAM) INHALATION ONCE
Qty: 0 | Refills: 0 | Status: COMPLETED | OUTPATIENT
Start: 2018-04-17 | End: 2018-04-17

## 2018-04-17 RX ORDER — HEPARIN SODIUM 5000 [USP'U]/ML
5000 INJECTION INTRAVENOUS; SUBCUTANEOUS EVERY 8 HOURS
Qty: 0 | Refills: 0 | Status: DISCONTINUED | OUTPATIENT
Start: 2018-04-17 | End: 2018-04-20

## 2018-04-17 RX ORDER — ASPIRIN/CALCIUM CARB/MAGNESIUM 324 MG
81 TABLET ORAL DAILY
Qty: 0 | Refills: 0 | Status: DISCONTINUED | OUTPATIENT
Start: 2018-04-17 | End: 2018-04-20

## 2018-04-17 RX ORDER — SODIUM CHLORIDE 9 MG/ML
1000 INJECTION INTRAMUSCULAR; INTRAVENOUS; SUBCUTANEOUS
Qty: 0 | Refills: 0 | Status: DISCONTINUED | OUTPATIENT
Start: 2018-04-17 | End: 2018-04-19

## 2018-04-17 RX ORDER — INSULIN LISPRO 100/ML
VIAL (ML) SUBCUTANEOUS AT BEDTIME
Qty: 0 | Refills: 0 | Status: DISCONTINUED | OUTPATIENT
Start: 2018-04-17 | End: 2018-04-20

## 2018-04-17 RX ORDER — BUDESONIDE AND FORMOTEROL FUMARATE DIHYDRATE 160; 4.5 UG/1; UG/1
2 AEROSOL RESPIRATORY (INHALATION)
Qty: 0 | Refills: 0 | Status: DISCONTINUED | OUTPATIENT
Start: 2018-04-17 | End: 2018-04-20

## 2018-04-17 RX ORDER — METFORMIN HYDROCHLORIDE 850 MG/1
0 TABLET ORAL
Qty: 0 | Refills: 0 | COMMUNITY

## 2018-04-17 RX ORDER — DEXTROSE 50 % IN WATER 50 %
12.5 SYRINGE (ML) INTRAVENOUS ONCE
Qty: 0 | Refills: 0 | Status: DISCONTINUED | OUTPATIENT
Start: 2018-04-17 | End: 2018-04-20

## 2018-04-17 RX ADMIN — SODIUM CHLORIDE 1000 MILLILITER(S): 9 INJECTION INTRAMUSCULAR; INTRAVENOUS; SUBCUTANEOUS at 19:16

## 2018-04-17 RX ADMIN — SODIUM CHLORIDE 500 MILLILITER(S): 9 INJECTION INTRAMUSCULAR; INTRAVENOUS; SUBCUTANEOUS at 21:03

## 2018-04-17 RX ADMIN — Medication 3 MILLILITER(S): at 17:39

## 2018-04-17 RX ADMIN — SODIUM CHLORIDE 100 MILLILITER(S): 9 INJECTION INTRAMUSCULAR; INTRAVENOUS; SUBCUTANEOUS at 23:11

## 2018-04-17 RX ADMIN — CEFTRIAXONE 100 GRAM(S): 500 INJECTION, POWDER, FOR SOLUTION INTRAMUSCULAR; INTRAVENOUS at 19:16

## 2018-04-17 RX ADMIN — BUDESONIDE AND FORMOTEROL FUMARATE DIHYDRATE 2 PUFF(S): 160; 4.5 AEROSOL RESPIRATORY (INHALATION) at 21:07

## 2018-04-17 RX ADMIN — Medication 3 MILLILITER(S): at 17:46

## 2018-04-17 RX ADMIN — Medication 600 MILLIGRAM(S): at 21:02

## 2018-04-17 RX ADMIN — AZITHROMYCIN 255 MILLIGRAM(S): 500 TABLET, FILM COATED ORAL at 19:24

## 2018-04-17 RX ADMIN — ALBUTEROL 2.5 MILLIGRAM(S): 90 AEROSOL, METERED ORAL at 20:40

## 2018-04-17 RX ADMIN — Medication 125 MILLIGRAM(S): at 17:35

## 2018-04-17 NOTE — CONSULT NOTE ADULT - PROBLEM SELECTOR RECOMMENDATION 9
asthma hx  resp distress  eval resp viral infection, check RVP  oral and skin care  Albuterol NEBS  Symbicort  Singulair  Low dose Prednisone PO   Mucinex BID   will check IgE and CRP and ProBNP and Peripheral Eosinophils  will check TFT  will check CT chest to assess atelectasis and or infiltrate  monitor pulse ox, keep sat > 88 pct  assess sat on exertion   old TTE noted, HFpEF - stage I, mild valv heart , will assess for volume overload, check ProBNP  dietary discretion cv disease and DM

## 2018-04-17 NOTE — H&P ADULT - NSHPREVIEWOFSYSTEMS_GEN_ALL_CORE
-    CONSTITUTIONAL: No fever, weight loss, or fatigue.  EYES: No eye pain, visual disturbances, or discharge.  ENMT:  No difficulty hearing, tinnitus, vertigo; No sinus or throat pain.  NECK: No pain or stiffness.  RESPIRATORY: (+) cough & wheezing, no hemoptysis; (+) shortness of breath.  CARDIOVASCULAR: No chest pain, palpitations, dizziness, or leg swelling.  GASTROINTESTINAL: (+) abdominal discomfort, no nausea, vomiting, or hematemesis; No diarrhea or Change in bowel habits. No melena or hematochezia.  GENITOURINARY: No dysuria, frequency, hematuria, or incontinence.  NEUROLOGICAL: No headaches, focal muscle weakness, numbness, or tremors.  SKIN: No itching, burning or rashes.  MUSCULOSKELETAL: No joint swelling or pain.  PSYCHIATRIC: No depression, anxiety, or agitation.  HEME/LYMPH: No easy bruising, bleeding gums, or nose bleed.  ALLERGY AND IMMUNOLOGIC: No hives or eczema.

## 2018-04-17 NOTE — ED PROVIDER NOTE - OBJECTIVE STATEMENT
57 y/o F pt with hx of asthma, HLD, diabetes  presents to ED c/o worsening SOB associated with productive cough and  chest congestion for approximately 1 week. She was seen by PMD on 4/12 given ventolin, singular, amoxicillin (which gave her abdominal pain) and prednisone  with no improvement. Nonsmoker. Denies chest pain, fevers, chills, abdominal pain. No further complaints at this time.   PMD: Theresa

## 2018-04-17 NOTE — H&P ADULT - PROBLEM SELECTOR PLAN 1
Possibly bacterial on top of viral, no signs suggestive of sepsis, no neutrophilia, was on antibiotic therapy prior to presentation, positive RVP for hMPV, admitted to hospital, contact & droplet precautions, started on Ceftriaxone Azithromycin IVPB, 1st dose of each was given after cultures were obtained by ED team, will tend TLC, and f/u culture results, pulmonary consult was called earlier with Dr. Styles who saw the patient prior to admission, and he will follow.

## 2018-04-17 NOTE — H&P ADULT - PROBLEM SELECTOR PLAN 2
ML related to infection, no perfusion problems, in the setting of Metformin use at home that the patient was non compliant with anyway as per  at the bedside, IVF hydration with NS was started earlier by ED team, received a total bolus of 2000 ml, will maintain at 100 ml/h, monitor lactic acid level, input and output, and bolus with NS as needed.

## 2018-04-17 NOTE — H&P ADULT - PROBLEM SELECTOR PLAN 4
Diet controlled, non compliant with Metformin, started her on insulin Lispro on a sliding scale before meals & at bedtime, may add glargine based on insuline requirement, will check glycohemoglobin level in am. Also will hold Amlodipine & start her instead on Lisinopril 10 mg PO daily for both BP management & renal protection.

## 2018-04-17 NOTE — ED ADULT NURSE NOTE - OBJECTIVE STATEMENT
patient has c/o SOB and feeling sick for 4 days and went her doctor, taken prescribed medications but didn't feel good, Pt is in no acute distress. denies fever or chills, IV accessed and tolerating. pt has sinus rhythm on the cardiac monitor, skin is warm to touch and intact. will continue to monitor.

## 2018-04-17 NOTE — H&P ADULT - HISTORY OF PRESENT ILLNESS
This is a 57 y/o F with PMH of HTN, DM, Dyslipidemia Asthma, Nephrolithiasis with Hydronephrosis s/p Lithotripsy, and Hypothyroidism presented with SOB. As per patient'  at the bedside, she was ok till a week ago, never had an asthma episode for more than 20 years since they came from [place] to [place], but a week ago she started to have progressive SOB, after attending a party, for 2 days, so she saw her PMD (5 days ago) who put her on an antibiotic (Amoxacillin) and an inhaler, but this didn't help much, and she started getting dyspepsia & abdominal discomfort from the antibiotic, meanwhile she started getting frequent cough productive of yellowish thick scanty non bloody sputum so they decided to come to the hospital. No fever or chills, no diarrhea or vomiting. No history or recent travel. This is a 59 y/o F with PMH of HTN, DM, Dyslipidemia Asthma, and Nephrolithiasis with Hydronephrosis s/p Lithotripsy presented with SOB. As per patient'  at the bedside, she was ok till a week ago, never had an asthma episode for more than 20 years since they came from [facility] to [place], but a week ago she started to have progressive SOB, after attending a party, for 2 days, so she saw her PMD (5 days ago) who put her on an antibiotic (Amoxacillin) and an inhaler, but this didn't help much, and she started getting dyspepsia & abdominal discomfort from the antibiotic, meanwhile she started getting frequent cough productive of yellowish thick scanty non bloody sputum so they decided to come to the hospital. No fever or chills, no diarrhea or vomiting. No history or recent travel.

## 2018-04-17 NOTE — ED ADULT NURSE NOTE - ED STAT RN HANDOFF DETAILS
endorsed to FLASH Jade. patient is sleeping and no acute distress, Pt's vital sign is within normal range.

## 2018-04-17 NOTE — H&P ADULT - PROBLEM SELECTOR PLAN 6
IMPROVE VTE Individual Risk Assessment          RISK                                                          Points    [  ] Previous VTE                                                3  [  ] Thrombophilia                                             2  [  ] Lower limb paralysis                                    2        (unable to hold up >15 seconds)    [  ] Current Cancer                                             2         (within 6 months)  [ x ] Immobilization > 24 hrs      (expected)         1  [  ] ICU/CCU stay > 24 hours                            1  [  ] Age > 60                                                    1    IMPROVE VTE Score   1    ** Patient is at moderate risk for VTE, IMPROVE score of 2 in addition to the other risk factors not included in this scoring system, started her on UFH 5000 subcutaneous every 8 hours for DVT prophylaxis.

## 2018-04-17 NOTE — H&P ADULT - PROBLEM SELECTOR PLAN 3
was stable with no episodes in long years after changing the environment, possibly eosinophilic asthma, started on Albuterol was stable with no episodes in long years after changing the environment, possibly eosinophilic asthma, was started on Albuterol nebs Q 8h and Symbicort 80/4.5 INH in addition to Montelukast daily, oxygen via NC with continuous SPO2 monitoring.

## 2018-04-17 NOTE — H&P ADULT - PROBLEM SELECTOR PLAN 5
Controlled on Amlodipine 5 mg PO daily, will hold, and start her instead on Lisinopril 10 mg PO daily for both BP management & renal protection as stated above.

## 2018-04-17 NOTE — H&P ADULT - NSHPLABSRESULTS_GEN_ALL_CORE
-      Lactate Trend   @ 17:20 Lactate:2.4                           14.4   10.8  )-----------( 365      ( 2018 17:32 )             42.7                136  |  102  |  9   ----------------------------<  151<H>  4.2   |  22  |  0.63    Ca    9.6      2018 17:21    TPro  8.3  /  Alb  4.0  /  TBili  0.4  /  DBili  x   /  AST  36  /  ALT  32  /  AlkPhos  88            Urinalysis Basic - ( 2018 20:10 )    Color: Yellow / Appearance: Clear / S.010 / pH: x  Gluc: x / Ketone: Negative  / Bili: Negative / Urobili: Negative mg/dL   Blood: x / Protein: Negative mg/dL / Nitrite: Negative   Leuk Esterase: Trace / RBC: 0-2 /HPF / WBC 3-5   Sq Epi: x / Non Sq Epi: Few / Bacteria: Occasional      PT/INR - ( 2018 17:21 )   PT: 11.5 sec;   INR: 1.05 ratio         PTT - ( 2018 17:21 )  PTT:22.1 sec        CT CHEST      Noncontrast study limits evaluation of hilar and mediastinal regions.  No thoracic aortic aneurysm or pericardial effusion. Central airway   intact. Visualized portions of the thyroid gland are normal in appearance.  No mediastinal or hilar lesions, evaluation limited due to lack of IV   contrast.   Small foci of airspace disease in the left upper lobe, findings   compatible with pneumonia in the proper clinical setting. Trace dependent   atelectasis both lung bases. Minimal airspace disease both lung bases   atelectatic and/or chronic. No pleural effusion.  The spleen is not enlarged. No acute osseous abnormalities.            XR CHEST PA LAT 2V               PA and lateral chest on 2018 at 5:39 PM.   Patient has cough and is short of breath for 3 to 4 days.  Heart size is within normal limits.  There is a small atelectatic infiltrate at the left base which is new   since 2018.  IMPRESSION: Small left base atelectatic infiltrate.  Image reviewed by me.            EKG   As per my review shows ST at 105/min, normal TN & QTc intervals, normal QRS voltage, duration, and axis (- 30), with normal transition, no ST-T abnormality.          -

## 2018-04-17 NOTE — CONSULT NOTE ADULT - SUBJECTIVE AND OBJECTIVE BOX
Date/Time Patient Seen:  		  Referring MD:   Data Reviewed	       Patient is a 58y old  Female who presents with a chief complaint of wheezing, cough, sob,     Subjective/HPI     in the bed  seen and examined  vs and meds reviewed   at the bedside  pt with hx of asthma  5 days of sx  pt was seen by PMD - s/p trial of steroids, proventil, has nebs at home    er provider note    · Chief Complaint: The patient is a 58y Female complaining of shortness of breath.  · HPI Objective Statement: 57 y/o F pt with hx of asthma, HLD, diabetes  presents to ED c/o worsening SOB associated with productive cough and  chest congestion for approximately 1 week. She was seen by PMD on 4/12 given ventolin, singular, amoxicillin (which gave her abdominal pain) and prednisone  with no improvement. Nonsmoker. Denies chest pain, fevers, chills, abdominal pain. No further complaints at this time.   PMD: Theresa      PAST MEDICAL & SURGICAL HISTORY:  Thyroid activity decreased  Hydronephrosis: right  Herpes simplex  DM (diabetes mellitus)  Impaired fasting glucose  HLD (hyperlipidemia)  GERD (gastroesophageal reflux disease)  Asthma  Hydronephrosis, unspecified hydronephrosis type  Calculus of kidney  Hypertension  H/O lithotripsy  S/P LASIK surgery: left eye  H/O tubal ligation        Medication list         MEDICATIONS  (STANDING):  ALBUTerol    0.083% 2.5 milliGRAM(s) Nebulizer every 8 hours  buDESOnide  80 MICROgram(s)/formoterol 4.5 MICROgram(s) Inhaler 2 Puff(s) Inhalation two times a day  guaiFENesin  milliGRAM(s) Oral every 12 hours  montelukast 10 milliGRAM(s) Oral at bedtime  predniSONE   Tablet 20 milliGRAM(s) Oral daily  sodium chloride 0.9% Bolus 1000 milliLiter(s) IV Bolus once  sodium chloride 0.9% lock flush 3 milliLiter(s) IV Push once    MEDICATIONS  (PRN):         Vitals log        ICU Vital Signs Last 24 Hrs  T(C): 36.7 (17 Apr 2018 16:28), Max: 36.7 (17 Apr 2018 16:28)  T(F): 98.1 (17 Apr 2018 16:28), Max: 98.1 (17 Apr 2018 16:28)  HR: 96 (17 Apr 2018 17:46) (92 - 100)  BP: 137/84 (17 Apr 2018 16:28) (137/84 - 137/84)  BP(mean): --  ABP: --  ABP(mean): --  RR: 18 (17 Apr 2018 16:28) (18 - 18)  SpO2: 99% (17 Apr 2018 17:46) (94% - 99%)           Input and Output:  I&O's Detail      Lab Data                        14.4   10.8  )-----------( 365      ( 17 Apr 2018 17:32 )             42.7     04-17    136  |  102  |  9   ----------------------------<  151<H>  4.2   |  22  |  0.63    Ca    9.6      17 Apr 2018 17:21    TPro  8.3  /  Alb  4.0  /  TBili  0.4  /  DBili  x   /  AST  36  /  ALT  32  /  AlkPhos  88  04-17      non smoker  non drinker  lives at home  house wife  originally from [place]        Review of Systems	  cough  sob  larry      Objective     Physical Examination    obese  head at  heart s1s2  lung dec BS  occ wheeze  abd soft  cn grossly int  extr no edema      Pertinent Lab findings & Imaging      Acosta:  NO   Adequate UO     I&O's Detail           Discussed with:     Cultures:	        Radiology      cxr - atelectasis

## 2018-04-17 NOTE — H&P ADULT - NSHPPHYSICALEXAM_GEN_ALL_CORE
-    Vital Signs Last 24 Hrs  T(C): 37.2 (17 Apr 2018 20:00), Max: 37.2 (17 Apr 2018 20:00)  T(F): 98.9 (17 Apr 2018 20:00), Max: 98.9 (17 Apr 2018 20:00)  HR: 101 (17 Apr 2018 20:40) (92 - 105)  BP: 122/68 (17 Apr 2018 20:00) (122/68 - 137/84)  BP(mean): --  RR: 19 (17 Apr 2018 20:00) (18 - 19)  SpO2: 96% (17 Apr 2018 20:40) (94% - 99%)          PHYSICAL EXAM:    GENERAL: NAD, well-groomed, well-developed.  HEAD:  Atraumatic, Normocephalic.  EYES: PERRLA, conjunctiva clear.  ENMT: no nasal discharge, no marah-pharyngeal erythema or exudates, MMM.   NECK: Supple, No JVD.  NERVOUS SYSTEM:  Alert & oriented X3, neurologically intact grossly.  CHEST/LUNG: Fair air entry B/L, (_) B/L lower lung zones rales L>R, with minimal scattered rhonchi.  HEART: Normal S1 & S2, no murmurs, or extra sounds.  ABDOMEN: Soft, obese, non tender, non distended; bowel sounds present, no palpable masses or organomegaly.  EXTREMITIES:  No clubbing, cyanosis, or edema.  VASCULAR: 2+ radial, DPA / PTA pulses B/L.  SKIN: No rashes or lesions.  PSYCH: normal affect & behavior.

## 2018-04-18 LAB
ANION GAP SERPL CALC-SCNC: 12 MMOL/L — SIGNIFICANT CHANGE UP (ref 5–17)
BASOPHILS # BLD AUTO: 0.1 K/UL — SIGNIFICANT CHANGE UP (ref 0–0.2)
BASOPHILS NFR BLD AUTO: 0.9 % — SIGNIFICANT CHANGE UP (ref 0–2)
BUN SERPL-MCNC: 6 MG/DL — LOW (ref 7–23)
CALCIUM SERPL-MCNC: 8.3 MG/DL — LOW (ref 8.4–10.5)
CHLORIDE SERPL-SCNC: 106 MMOL/L — SIGNIFICANT CHANGE UP (ref 96–108)
CO2 SERPL-SCNC: 22 MMOL/L — SIGNIFICANT CHANGE UP (ref 22–31)
CREAT SERPL-MCNC: 0.53 MG/DL — SIGNIFICANT CHANGE UP (ref 0.5–1.3)
CRP SERPL-MCNC: 1.5 MG/DL — HIGH (ref 0–0.4)
EOSINOPHIL # BLD AUTO: 0 K/UL — SIGNIFICANT CHANGE UP (ref 0–0.5)
EOSINOPHIL # BLD: 0 /UL — LOW (ref 50–350)
EOSINOPHIL NFR BLD AUTO: 0 % — SIGNIFICANT CHANGE UP (ref 0–6)
GLUCOSE SERPL-MCNC: 200 MG/DL — HIGH (ref 70–99)
HBA1C BLD-MCNC: 6.7 % — HIGH (ref 4–5.6)
HCT VFR BLD CALC: 35.6 % — SIGNIFICANT CHANGE UP (ref 34.5–45)
HGB BLD-MCNC: 12.1 G/DL — SIGNIFICANT CHANGE UP (ref 11.5–15.5)
IGE SERPL-ACNC: 282 IU/ML — HIGH (ref 0–100)
LYMPHOCYTES # BLD AUTO: 1.9 K/UL — SIGNIFICANT CHANGE UP (ref 1–3.3)
LYMPHOCYTES # BLD AUTO: 16 % — SIGNIFICANT CHANGE UP (ref 13–44)
MCHC RBC-ENTMCNC: 28.6 PG — SIGNIFICANT CHANGE UP (ref 27–34)
MCHC RBC-ENTMCNC: 33.9 GM/DL — SIGNIFICANT CHANGE UP (ref 32–36)
MCV RBC AUTO: 84.3 FL — SIGNIFICANT CHANGE UP (ref 80–100)
MONOCYTES # BLD AUTO: 0.5 K/UL — SIGNIFICANT CHANGE UP (ref 0–0.9)
MONOCYTES NFR BLD AUTO: 4.2 % — SIGNIFICANT CHANGE UP (ref 2–14)
NEUTROPHILS # BLD AUTO: 9.3 K/UL — HIGH (ref 1.8–7.4)
NEUTROPHILS NFR BLD AUTO: 78.9 % — HIGH (ref 43–77)
PLATELET # BLD AUTO: 329 K/UL — SIGNIFICANT CHANGE UP (ref 150–400)
POTASSIUM SERPL-MCNC: 3.4 MMOL/L — LOW (ref 3.5–5.3)
POTASSIUM SERPL-SCNC: 3.4 MMOL/L — LOW (ref 3.5–5.3)
RBC # BLD: 4.23 M/UL — SIGNIFICANT CHANGE UP (ref 3.8–5.2)
RBC # FLD: 12.4 % — SIGNIFICANT CHANGE UP (ref 10.3–14.5)
SODIUM SERPL-SCNC: 140 MMOL/L — SIGNIFICANT CHANGE UP (ref 135–145)
T3 SERPL-MCNC: 64 NG/DL — LOW (ref 80–200)
T4 AB SER-ACNC: 5.3 UG/DL — SIGNIFICANT CHANGE UP (ref 4.6–12)
TSH SERPL-MCNC: 1.59 UIU/ML — SIGNIFICANT CHANGE UP (ref 0.27–4.2)
WBC # BLD: 11.8 K/UL — HIGH (ref 3.8–10.5)
WBC # FLD AUTO: 11.8 K/UL — HIGH (ref 3.8–10.5)

## 2018-04-18 PROCEDURE — 12345: CPT | Mod: NC

## 2018-04-18 PROCEDURE — 99233 SBSQ HOSP IP/OBS HIGH 50: CPT

## 2018-04-18 RX ORDER — DOCUSATE SODIUM 100 MG
100 CAPSULE ORAL DAILY
Qty: 0 | Refills: 0 | Status: COMPLETED | OUTPATIENT
Start: 2018-04-18 | End: 2018-04-18

## 2018-04-18 RX ORDER — SODIUM CHLORIDE 9 MG/ML
1000 INJECTION INTRAMUSCULAR; INTRAVENOUS; SUBCUTANEOUS ONCE
Qty: 0 | Refills: 0 | Status: COMPLETED | OUTPATIENT
Start: 2018-04-18 | End: 2018-04-18

## 2018-04-18 RX ORDER — AZITHROMYCIN 500 MG/1
500 TABLET, FILM COATED ORAL EVERY 24 HOURS
Qty: 0 | Refills: 0 | Status: DISCONTINUED | OUTPATIENT
Start: 2018-04-18 | End: 2018-04-20

## 2018-04-18 RX ORDER — LISINOPRIL 2.5 MG/1
10 TABLET ORAL DAILY
Qty: 0 | Refills: 0 | Status: DISCONTINUED | OUTPATIENT
Start: 2018-04-18 | End: 2018-04-20

## 2018-04-18 RX ORDER — CEFTRIAXONE 500 MG/1
1 INJECTION, POWDER, FOR SOLUTION INTRAMUSCULAR; INTRAVENOUS EVERY 24 HOURS
Qty: 0 | Refills: 0 | Status: DISCONTINUED | OUTPATIENT
Start: 2018-04-18 | End: 2018-04-20

## 2018-04-18 RX ADMIN — SODIUM CHLORIDE 1000 MILLILITER(S): 9 INJECTION INTRAMUSCULAR; INTRAVENOUS; SUBCUTANEOUS at 00:10

## 2018-04-18 RX ADMIN — Medication 600 MILLIGRAM(S): at 06:16

## 2018-04-18 RX ADMIN — Medication 600 MILLIGRAM(S): at 18:37

## 2018-04-18 RX ADMIN — HEPARIN SODIUM 5000 UNIT(S): 5000 INJECTION INTRAVENOUS; SUBCUTANEOUS at 13:45

## 2018-04-18 RX ADMIN — Medication 20 MILLIGRAM(S): at 06:16

## 2018-04-18 RX ADMIN — Medication 100 MILLIGRAM(S): at 21:31

## 2018-04-18 RX ADMIN — Medication 81 MILLIGRAM(S): at 12:46

## 2018-04-18 RX ADMIN — Medication 2: at 07:26

## 2018-04-18 RX ADMIN — SODIUM CHLORIDE 100 MILLILITER(S): 9 INJECTION INTRAMUSCULAR; INTRAVENOUS; SUBCUTANEOUS at 02:13

## 2018-04-18 RX ADMIN — ALBUTEROL 2.5 MILLIGRAM(S): 90 AEROSOL, METERED ORAL at 07:34

## 2018-04-18 RX ADMIN — ALBUTEROL 2.5 MILLIGRAM(S): 90 AEROSOL, METERED ORAL at 14:50

## 2018-04-18 RX ADMIN — MONTELUKAST 10 MILLIGRAM(S): 4 TABLET, CHEWABLE ORAL at 21:31

## 2018-04-18 RX ADMIN — AZITHROMYCIN 255 MILLIGRAM(S): 500 TABLET, FILM COATED ORAL at 19:21

## 2018-04-18 RX ADMIN — CEFTRIAXONE 100 GRAM(S): 500 INJECTION, POWDER, FOR SOLUTION INTRAMUSCULAR; INTRAVENOUS at 18:36

## 2018-04-18 RX ADMIN — LISINOPRIL 10 MILLIGRAM(S): 2.5 TABLET ORAL at 06:16

## 2018-04-18 RX ADMIN — SODIUM CHLORIDE 1000 MILLILITER(S): 9 INJECTION INTRAMUSCULAR; INTRAVENOUS; SUBCUTANEOUS at 03:21

## 2018-04-18 RX ADMIN — Medication 4: at 17:38

## 2018-04-18 RX ADMIN — HEPARIN SODIUM 5000 UNIT(S): 5000 INJECTION INTRAVENOUS; SUBCUTANEOUS at 21:31

## 2018-04-18 RX ADMIN — HEPARIN SODIUM 5000 UNIT(S): 5000 INJECTION INTRAVENOUS; SUBCUTANEOUS at 06:17

## 2018-04-18 RX ADMIN — SODIUM CHLORIDE 1000 MILLILITER(S): 9 INJECTION INTRAMUSCULAR; INTRAVENOUS; SUBCUTANEOUS at 01:10

## 2018-04-18 RX ADMIN — Medication 2: at 12:45

## 2018-04-18 RX ADMIN — BUDESONIDE AND FORMOTEROL FUMARATE DIHYDRATE 2 PUFF(S): 160; 4.5 AEROSOL RESPIRATORY (INHALATION) at 06:16

## 2018-04-18 RX ADMIN — BUDESONIDE AND FORMOTEROL FUMARATE DIHYDRATE 2 PUFF(S): 160; 4.5 AEROSOL RESPIRATORY (INHALATION) at 18:37

## 2018-04-18 RX ADMIN — SODIUM CHLORIDE 30 MILLILITER(S): 9 INJECTION INTRAMUSCULAR; INTRAVENOUS; SUBCUTANEOUS at 19:21

## 2018-04-18 RX ADMIN — ALBUTEROL 2.5 MILLIGRAM(S): 90 AEROSOL, METERED ORAL at 22:49

## 2018-04-18 NOTE — PROGRESS NOTE ADULT - SUBJECTIVE AND OBJECTIVE BOX
Date/Time Patient Seen:  		  Referring MD:   Data Reviewed	       Patient is a 58y old  Female who presents with a chief complaint of SOB. (17 Apr 2018 22:57)  in bed  seen and examined  vs and meds reviewed  on ABX        Subjective/HPI     PAST MEDICAL & SURGICAL HISTORY:  Thyroid activity decreased  Hydronephrosis: right  Herpes simplex  DM (diabetes mellitus)  Impaired fasting glucose  HLD (hyperlipidemia)  GERD (gastroesophageal reflux disease)  Asthma  Hydronephrosis, unspecified hydronephrosis type  Calculus of kidney  Hypertension  H/O lithotripsy  S/P LASIK surgery: left eye  H/O tubal ligation        Medication list         MEDICATIONS  (STANDING):  ALBUTerol    0.083% 2.5 milliGRAM(s) Nebulizer every 8 hours  aspirin  chewable 81 milliGRAM(s) Oral daily  azithromycin  IVPB 500 milliGRAM(s) IV Intermittent every 24 hours  buDESOnide  80 MICROgram(s)/formoterol 4.5 MICROgram(s) Inhaler 2 Puff(s) Inhalation two times a day  cefTRIAXone   IVPB 1 Gram(s) IV Intermittent every 24 hours  dextrose 5%. 1000 milliLiter(s) (50 mL/Hr) IV Continuous <Continuous>  dextrose 50% Injectable 12.5 Gram(s) IV Push once  dextrose 50% Injectable 25 Gram(s) IV Push once  dextrose 50% Injectable 25 Gram(s) IV Push once  guaiFENesin  milliGRAM(s) Oral every 12 hours  heparin  Injectable 5000 Unit(s) SubCutaneous every 8 hours  insulin lispro (HumaLOG) corrective regimen sliding scale   SubCutaneous three times a day before meals  insulin lispro (HumaLOG) corrective regimen sliding scale   SubCutaneous at bedtime  lisinopril 10 milliGRAM(s) Oral daily  montelukast 10 milliGRAM(s) Oral at bedtime  predniSONE   Tablet 20 milliGRAM(s) Oral daily  sodium chloride 0.9%. 1000 milliLiter(s) (30 mL/Hr) IV Continuous <Continuous>    MEDICATIONS  (PRN):  dextrose Gel 1 Dose(s) Oral once PRN Blood Glucose LESS THAN 70 milliGRAM(s)/deciliter  glucagon  Injectable 1 milliGRAM(s) IntraMuscular once PRN Glucose LESS THAN 70 milligrams/deciliter         Vitals log        ICU Vital Signs Last 24 Hrs  T(C): 36.8 (18 Apr 2018 06:15), Max: 37.2 (17 Apr 2018 20:00)  T(F): 98.3 (18 Apr 2018 06:15), Max: 98.9 (17 Apr 2018 20:00)  HR: 90 (18 Apr 2018 06:15) (86 - 105)  BP: 124/77 (18 Apr 2018 06:15) (117/57 - 143/83)  BP(mean): --  ABP: --  ABP(mean): --  RR: 18 (18 Apr 2018 06:15) (17 - 19)  SpO2: 97% (18 Apr 2018 06:15) (94% - 99%)           Input and Output:  I&O's Detail    17 Apr 2018 07:01  -  18 Apr 2018 07:00  --------------------------------------------------------  IN:    Sodium Chloride 0.9% IV Bolus: 3000 mL    sodium chloride 0.9%.: 400 mL  Total IN: 3400 mL    OUT:  Total OUT: 0 mL    Total NET: 3400 mL          Lab Data                        12.1   11.8  )-----------( 329      ( 18 Apr 2018 05:56 )             35.6     04-18    140  |  106  |  6<L>  ----------------------------<  200<H>  3.4<L>   |  22  |  0.53    Ca    8.3<L>      18 Apr 2018 05:56    TPro  8.3  /  Alb  4.0  /  TBili  0.4  /  DBili  x   /  AST  36  /  ALT  32  /  AlkPhos  88  04-17            Review of Systems	      Objective     Physical Examination    head at  heart s1s2  lung dec BS  abd soft  obese  cn grossly int      Pertinent Lab findings & Imaging      Dave:  NO   Adequate UO     I&O's Detail    17 Apr 2018 07:01  -  18 Apr 2018 07:00  --------------------------------------------------------  IN:    Sodium Chloride 0.9% IV Bolus: 3000 mL    sodium chloride 0.9%.: 400 mL  Total IN: 3400 mL    OUT:  Total OUT: 0 mL    Total NET: 3400 mL               Discussed with:     Cultures:	        Radiology

## 2018-04-18 NOTE — PROGRESS NOTE ADULT - PROBLEM SELECTOR PLAN 3
was stable with no episodes in long years after changing the environment, possibly eosinophilic asthma, was started on Albuterol nebs Q 8h and Symbicort 80/4.5 INH in addition to Montelukast daily, oxygen via NC with continuous SPO2 monitoring. - triggered by resp infection  - continue Symbicort, Prednisone, Nebs, Singulair  - supplemental oxygen PRN

## 2018-04-18 NOTE — PROGRESS NOTE ADULT - SUBJECTIVE AND OBJECTIVE BOX
INTERVAL HPI/OVERNIGHT EVENTS:   Patient seen and examined.    MEDICATIONS  (STANDING):  ALBUTerol    0.083% 2.5 milliGRAM(s) Nebulizer every 8 hours  aspirin  chewable 81 milliGRAM(s) Oral daily  azithromycin  IVPB 500 milliGRAM(s) IV Intermittent every 24 hours  buDESOnide  80 MICROgram(s)/formoterol 4.5 MICROgram(s) Inhaler 2 Puff(s) Inhalation two times a day  cefTRIAXone   IVPB 1 Gram(s) IV Intermittent every 24 hours  dextrose 5%. 1000 milliLiter(s) (50 mL/Hr) IV Continuous <Continuous>  dextrose 50% Injectable 12.5 Gram(s) IV Push once  dextrose 50% Injectable 25 Gram(s) IV Push once  dextrose 50% Injectable 25 Gram(s) IV Push once  guaiFENesin  milliGRAM(s) Oral every 12 hours  heparin  Injectable 5000 Unit(s) SubCutaneous every 8 hours  insulin lispro (HumaLOG) corrective regimen sliding scale   SubCutaneous three times a day before meals  insulin lispro (HumaLOG) corrective regimen sliding scale   SubCutaneous at bedtime  lisinopril 10 milliGRAM(s) Oral daily  montelukast 10 milliGRAM(s) Oral at bedtime  predniSONE   Tablet 20 milliGRAM(s) Oral daily  sodium chloride 0.9%. 1000 milliLiter(s) (30 mL/Hr) IV Continuous <Continuous>    MEDICATIONS  (PRN):  dextrose Gel 1 Dose(s) Oral once PRN Blood Glucose LESS THAN 70 milliGRAM(s)/deciliter  glucagon  Injectable 1 milliGRAM(s) IntraMuscular once PRN Glucose LESS THAN 70 milligrams/deciliter      REVIEW OF SYSTEMS:  See HPI,  all others negative    PHYSICAL EXAM:  Vital Signs Last 24 Hrs  T(C): 36.9 (2018 08:25), Max: 37.2 (2018 20:00)  T(F): 98.4 (2018 08:25), Max: 98.9 (2018 20:00)  HR: 88 (2018 08:25) (86 - 105)  BP: 132/72 (2018 08:25) (117/57 - 143/83)  BP(mean): --  RR: 18 (2018 08:25) (17 - 19)  SpO2: 98% (2018 08:25) (94% - 99%)    GENERAL: NAD, well-groomed, well-developed, awake, alert, oriented x 3, fluent and coherent speech  HEAD:  Atraumatic, Normocephalic  EYES: EOMI, PERRLA, conjunctiva and sclera clear  ENMT: No tonsillar erythema, exudates, or enlargement; Moist mucous membranes, Good dentition, No lesions  NECK: Supple, No JVD, No Cervical LAD, No thyromegaly, No thyroid nodules felt  NERVOUS SYSTEM:  Good concentration; Moving all 4 extremities; No gross sensory deficits, No facial droop  CHEST WALL: No masses  CHEST/LUNG: Clear to auscultation bilaterally; No rales, rhonchi, wheezing, or rubs  HEART: Regular rate and rhythm; No murmurs, rubs, or gallops  ABDOMEN: Soft, Nontender, Nondistended, Bowel sounds present, No palpable masses or organomegaly, No bruits  EXTREMITIES:  2+ Peripheral Pulses, No clubbing, cyanosis, or edema  LYMPH: No lymphadenopathy noted  SKIN: No rashes or lesions  INCISION: dressing c/d/i    LABS:                        12.1   11.8  )-----------( 329      ( 2018 05:56 )             35.6     2018 05:56    140    |  106    |  6      ----------------------------<  200    3.4     |  22     |  0.53     Ca    8.3        2018 05:56    TPro  8.3    /  Alb  4.0    /  TBili  0.4    /  DBili  x      /  AST  36     /  ALT  32     /  AlkPhos  88     2018 17:21    PT/INR - ( 2018 17:21 )   PT: 11.5 sec;   INR: 1.05 ratio         PTT - ( 2018 17:21 )  PTT:22.1 sec  Urinalysis Basic - ( 2018 20:10 )    Color: Yellow / Appearance: Clear / S.010 / pH: x  Gluc: x / Ketone: Negative  / Bili: Negative / Urobili: Negative mg/dL   Blood: x / Protein: Negative mg/dL / Nitrite: Negative   Leuk Esterase: Trace / RBC: 0-2 /HPF / WBC 3-5   Sq Epi: x / Non Sq Epi: Few / Bacteria: Occasional         RADIOLOGY & ADDITIONAL TESTS: INTERVAL HPI/OVERNIGHT EVENTS:   Patient seen and examined.  Dyspnea has decreased, cough less frequent - still white phlegm.  No fevers, chills, sweats, dizziness, ha, cp, n/v/d, abd pain, dysuria, calf pain, or focal weakness.    REVIEW OF SYSTEMS:  See HPI,  all others negative    PHYSICAL EXAM:  Vital Signs Last 24 Hrs  T(C): 36.9 (2018 08:25), Max: 37.2 (2018 20:00)  T(F): 98.4 (2018 08:25), Max: 98.9 (2018 20:00)  HR: 88 (2018 08:25) (86 - 105)  BP: 132/72 (2018 08:25) (117/57 - 143/83)  BP(mean): --  RR: 18 (2018 08:25) (17 - 19)  SpO2: 98% (2018 08:25) (94% - 99%)    GENERAL: NAD, well-groomed, well-developed, awake, alert, oriented x 3, fluent and coherent speech  HEAD:  Atraumatic, Normocephalic  EYES: EOMI, PERRLA, conjunctiva and sclera clear  ENMT: No tonsillar erythema, exudates, or enlargement; Moist mucous membranes, Good dentition, No lesions  NECK: Supple, No JVD, No Cervical LAD, No thyromegaly, No thyroid nodules felt  NERVOUS SYSTEM:  Good concentration; Moving all 4 extremities; No gross sensory deficits, No facial droop  CHEST/LUNG: coarse BS b/l, mild exp wheezes b/l  HEART: Regular rate and rhythm; No murmurs, rubs, or gallops  ABDOMEN: Soft, Nontender, Nondistended, Bowel sounds present, No palpable masses or organomegaly, No bruits  EXTREMITIES:  2+ Peripheral Pulses, No clubbing, cyanosis, or edema  LYMPH: No lymphadenopathy noted    LABS:                        12.1   11.8  )-----------( 329      ( 2018 05:56 )             35.6     2018 05:56    140    |  106    |  6      ----------------------------<  200    3.4     |  22     |  0.53     Ca    8.3        2018 05:56    TPro  8.3    /  Alb  4.0    /  TBili  0.4    /  DBili  x      /  AST  36     /  ALT  32     /  AlkPhos  88     2018 17:21    PT/INR - ( 2018 17:21 )   PT: 11.5 sec;   INR: 1.05 ratio         PTT - ( 2018 17:21 )  PTT:22.1 sec  Urinalysis Basic - ( 2018 20:10 )    Color: Yellow / Appearance: Clear / S.010 / pH: x  Gluc: x / Ketone: Negative  / Bili: Negative / Urobili: Negative mg/dL   Blood: x / Protein: Negative mg/dL / Nitrite: Negative   Leuk Esterase: Trace / RBC: 0-2 /HPF / WBC 3-5   Sq Epi: x / Non Sq Epi: Few / Bacteria: Occasional         RADIOLOGY & ADDITIONAL TESTS:

## 2018-04-18 NOTE — PROGRESS NOTE ADULT - PROBLEM SELECTOR PLAN 5
Controlled on Amlodipine 5 mg PO daily, will hold, and start her instead on Lisinopril 10 mg PO daily for both BP management & renal protection as stated above. - Amlodipine DC'ed  - Lisinopril added for BP as well as renal protection (DM)

## 2018-04-18 NOTE — PROGRESS NOTE ADULT - PROBLEM SELECTOR PLAN 6
IMPROVE VTE Individual Risk Assessment          RISK                                                          Points    [  ] Previous VTE                                                3  [  ] Thrombophilia                                             2  [  ] Lower limb paralysis                                    2        (unable to hold up >15 seconds)    [  ] Current Cancer                                             2         (within 6 months)  [ x ] Immobilization > 24 hrs      (expected)         1  [  ] ICU/CCU stay > 24 hours                            1  [  ] Age > 60                                                    1    IMPROVE VTE Score   1    ** Patient is at moderate risk for VTE, IMPROVE score of 2 in addition to the other risk factors not included in this scoring system, started her on UFH 5000 subcutaneous every 8 hours for DVT prophylaxis. Heparin SC

## 2018-04-19 DIAGNOSIS — E87.6 HYPOKALEMIA: ICD-10-CM

## 2018-04-19 LAB
ANION GAP SERPL CALC-SCNC: 10 MMOL/L — SIGNIFICANT CHANGE UP (ref 5–17)
BUN SERPL-MCNC: 6 MG/DL — LOW (ref 7–23)
CALCIUM SERPL-MCNC: 9 MG/DL — SIGNIFICANT CHANGE UP (ref 8.4–10.5)
CHLORIDE SERPL-SCNC: 106 MMOL/L — SIGNIFICANT CHANGE UP (ref 96–108)
CO2 SERPL-SCNC: 25 MMOL/L — SIGNIFICANT CHANGE UP (ref 22–31)
CREAT SERPL-MCNC: 0.54 MG/DL — SIGNIFICANT CHANGE UP (ref 0.5–1.3)
GLUCOSE SERPL-MCNC: 129 MG/DL — HIGH (ref 70–99)
HCT VFR BLD CALC: 35.8 % — SIGNIFICANT CHANGE UP (ref 34.5–45)
HGB BLD-MCNC: 12.3 G/DL — SIGNIFICANT CHANGE UP (ref 11.5–15.5)
MCHC RBC-ENTMCNC: 29.2 PG — SIGNIFICANT CHANGE UP (ref 27–34)
MCHC RBC-ENTMCNC: 34.4 GM/DL — SIGNIFICANT CHANGE UP (ref 32–36)
MCV RBC AUTO: 84.8 FL — SIGNIFICANT CHANGE UP (ref 80–100)
PLATELET # BLD AUTO: 355 K/UL — SIGNIFICANT CHANGE UP (ref 150–400)
POTASSIUM SERPL-MCNC: 3.1 MMOL/L — LOW (ref 3.5–5.3)
POTASSIUM SERPL-SCNC: 3.1 MMOL/L — LOW (ref 3.5–5.3)
PROCALCITONIN SERPL-MCNC: <0.05 NG/ML — SIGNIFICANT CHANGE UP (ref 0–0.04)
RBC # BLD: 4.22 M/UL — SIGNIFICANT CHANGE UP (ref 3.8–5.2)
RBC # FLD: 12.5 % — SIGNIFICANT CHANGE UP (ref 10.3–14.5)
SODIUM SERPL-SCNC: 141 MMOL/L — SIGNIFICANT CHANGE UP (ref 135–145)
WBC # BLD: 14.9 K/UL — HIGH (ref 3.8–10.5)
WBC # FLD AUTO: 14.9 K/UL — HIGH (ref 3.8–10.5)

## 2018-04-19 PROCEDURE — 99233 SBSQ HOSP IP/OBS HIGH 50: CPT

## 2018-04-19 RX ORDER — POTASSIUM CHLORIDE 20 MEQ
40 PACKET (EA) ORAL ONCE
Qty: 0 | Refills: 0 | Status: COMPLETED | OUTPATIENT
Start: 2018-04-19 | End: 2018-04-19

## 2018-04-19 RX ADMIN — ALBUTEROL 2.5 MILLIGRAM(S): 90 AEROSOL, METERED ORAL at 08:02

## 2018-04-19 RX ADMIN — Medication 2: at 12:34

## 2018-04-19 RX ADMIN — HEPARIN SODIUM 5000 UNIT(S): 5000 INJECTION INTRAVENOUS; SUBCUTANEOUS at 05:50

## 2018-04-19 RX ADMIN — MONTELUKAST 10 MILLIGRAM(S): 4 TABLET, CHEWABLE ORAL at 21:05

## 2018-04-19 RX ADMIN — Medication 600 MILLIGRAM(S): at 05:51

## 2018-04-19 RX ADMIN — AZITHROMYCIN 255 MILLIGRAM(S): 500 TABLET, FILM COATED ORAL at 20:06

## 2018-04-19 RX ADMIN — BUDESONIDE AND FORMOTEROL FUMARATE DIHYDRATE 2 PUFF(S): 160; 4.5 AEROSOL RESPIRATORY (INHALATION) at 18:08

## 2018-04-19 RX ADMIN — LISINOPRIL 10 MILLIGRAM(S): 2.5 TABLET ORAL at 05:51

## 2018-04-19 RX ADMIN — Medication 81 MILLIGRAM(S): at 15:36

## 2018-04-19 RX ADMIN — HEPARIN SODIUM 5000 UNIT(S): 5000 INJECTION INTRAVENOUS; SUBCUTANEOUS at 21:06

## 2018-04-19 RX ADMIN — Medication 600 MILLIGRAM(S): at 17:23

## 2018-04-19 RX ADMIN — Medication 40 MILLIEQUIVALENT(S): at 07:41

## 2018-04-19 RX ADMIN — ALBUTEROL 2.5 MILLIGRAM(S): 90 AEROSOL, METERED ORAL at 22:31

## 2018-04-19 RX ADMIN — HEPARIN SODIUM 5000 UNIT(S): 5000 INJECTION INTRAVENOUS; SUBCUTANEOUS at 15:36

## 2018-04-19 RX ADMIN — Medication 20 MILLIGRAM(S): at 05:51

## 2018-04-19 RX ADMIN — ALBUTEROL 2.5 MILLIGRAM(S): 90 AEROSOL, METERED ORAL at 15:56

## 2018-04-19 RX ADMIN — CEFTRIAXONE 100 GRAM(S): 500 INJECTION, POWDER, FOR SOLUTION INTRAMUSCULAR; INTRAVENOUS at 17:22

## 2018-04-19 RX ADMIN — BUDESONIDE AND FORMOTEROL FUMARATE DIHYDRATE 2 PUFF(S): 160; 4.5 AEROSOL RESPIRATORY (INHALATION) at 08:41

## 2018-04-19 NOTE — DIETITIAN INITIAL EVALUATION ADULT. - OTHER INFO
58F adm from home c PNA. Pt currently on Con CHO, DASH/TLC diet c adequate po intake, states appetite is decreased secondary to not feeling well but it mindful to eat small portion at mealtime.  Pt eating food from home at time of visit, tolerating consistency well. Pt reports she is a vegetarian, accepts dairy. Pt c hx DM2 (A1c 6.7%), pt and family interested in diet education- provided education (verbal and written) on introduction to CHO counting, F&N phone number provided for f/u questions. Skin intact, kareen 23.

## 2018-04-19 NOTE — DIETITIAN INITIAL EVALUATION ADULT. - NS AS NUTRI INTERV MEALS SNACK
c/w con cho dash diet/Carbohydrate - modified diet/Fat - modified diet/Mineral - modified diet/General/healthful diet

## 2018-04-19 NOTE — PROGRESS NOTE ADULT - SUBJECTIVE AND OBJECTIVE BOX
Date/Time Patient Seen:  		  Referring MD:   Data Reviewed	       Patient is a 58y old  Female who presents with a chief complaint of SOB. (17 Apr 2018 22:57)  in bed  seen and examined  vs and meds reviewed        Subjective/HPI     PAST MEDICAL & SURGICAL HISTORY:  Thyroid activity decreased  Hydronephrosis: right  Herpes simplex  DM (diabetes mellitus)  Impaired fasting glucose  HLD (hyperlipidemia)  GERD (gastroesophageal reflux disease)  Asthma  Hydronephrosis, unspecified hydronephrosis type  Calculus of kidney  Hypertension  H/O lithotripsy  S/P LASIK surgery: left eye  H/O tubal ligation        Medication list         MEDICATIONS  (STANDING):  ALBUTerol    0.083% 2.5 milliGRAM(s) Nebulizer every 8 hours  aspirin  chewable 81 milliGRAM(s) Oral daily  azithromycin  IVPB 500 milliGRAM(s) IV Intermittent every 24 hours  buDESOnide  80 MICROgram(s)/formoterol 4.5 MICROgram(s) Inhaler 2 Puff(s) Inhalation two times a day  cefTRIAXone   IVPB 1 Gram(s) IV Intermittent every 24 hours  dextrose 5%. 1000 milliLiter(s) (50 mL/Hr) IV Continuous <Continuous>  dextrose 50% Injectable 12.5 Gram(s) IV Push once  dextrose 50% Injectable 25 Gram(s) IV Push once  dextrose 50% Injectable 25 Gram(s) IV Push once  guaiFENesin  milliGRAM(s) Oral every 12 hours  heparin  Injectable 5000 Unit(s) SubCutaneous every 8 hours  insulin lispro (HumaLOG) corrective regimen sliding scale   SubCutaneous three times a day before meals  insulin lispro (HumaLOG) corrective regimen sliding scale   SubCutaneous at bedtime  lisinopril 10 milliGRAM(s) Oral daily  montelukast 10 milliGRAM(s) Oral at bedtime  predniSONE   Tablet 20 milliGRAM(s) Oral daily    MEDICATIONS  (PRN):  dextrose Gel 1 Dose(s) Oral once PRN Blood Glucose LESS THAN 70 milliGRAM(s)/deciliter  glucagon  Injectable 1 milliGRAM(s) IntraMuscular once PRN Glucose LESS THAN 70 milligrams/deciliter         Vitals log        ICU Vital Signs Last 24 Hrs  T(C): 36.8 (19 Apr 2018 05:49), Max: 37.1 (18 Apr 2018 19:39)  T(F): 98.3 (19 Apr 2018 05:49), Max: 98.8 (18 Apr 2018 19:39)  HR: 69 (19 Apr 2018 05:49) (63 - 92)  BP: 129/68 (19 Apr 2018 05:49) (117/59 - 135/72)  BP(mean): --  ABP: --  ABP(mean): --  RR: 14 (19 Apr 2018 05:49) (14 - 18)  SpO2: 98% (19 Apr 2018 05:49) (96% - 98%)           Input and Output:  I&O's Detail    17 Apr 2018 07:01  -  18 Apr 2018 07:00  --------------------------------------------------------  IN:    sodium chloride 0.9%: 600 mL    Sodium Chloride 0.9% IV Bolus: 3000 mL  Total IN: 3600 mL    OUT:  Total OUT: 0 mL    Total NET: 3600 mL          Lab Data                        12.3   14.9  )-----------( 355      ( 19 Apr 2018 06:05 )             35.8     04-19    141  |  106  |  6<L>  ----------------------------<  129<H>  3.1<L>   |  25  |  0.54    Ca    9.0      19 Apr 2018 06:05    TPro  8.3  /  Alb  4.0  /  TBili  0.4  /  DBili  x   /  AST  36  /  ALT  32  /  AlkPhos  88  04-17            Review of Systems	      Objective     Physical Examination    head at  heart s1s2  lung dec BS  abd soft      Pertinent Lab findings & Imaging      Dave:  NO   Adequate UO     I&O's Detail    17 Apr 2018 07:01  -  18 Apr 2018 07:00  --------------------------------------------------------  IN:    sodium chloride 0.9%: 600 mL    Sodium Chloride 0.9% IV Bolus: 3000 mL  Total IN: 3600 mL    OUT:  Total OUT: 0 mL    Total NET: 3600 mL               Discussed with:     Cultures:	        Radiology

## 2018-04-19 NOTE — PROGRESS NOTE ADULT - PROBLEM SELECTOR PLAN 6
Heparin SC - no diarrhea/vomiting  - unclear etiology, ? due to medications  - supplemented PO  - repeat in AM and check Mag

## 2018-04-19 NOTE — DIETITIAN INITIAL EVALUATION ADULT. - PROBLEM SELECTOR PLAN 3
was stable with no episodes in long years after changing the environment, possibly eosinophilic asthma, was started on Albuterol nebs Q 8h and Symbicort 80/4.5 INH in addition to Montelukast daily, oxygen via NC with continuous SPO2 monitoring.

## 2018-04-19 NOTE — PROGRESS NOTE ADULT - SUBJECTIVE AND OBJECTIVE BOX
INTERVAL HPI/OVERNIGHT EVENTS:   Patient seen and examined.  Dyspnea has decreased, cough less frequent.  No fevers, chills, sweats, dizziness, ha, cp, n/v/d, abd pain, dysuria, calf pain, or focal weakness.    REVIEW OF SYSTEMS:  See HPI,  all others negative    PHYSICAL EXAM:  Vital Signs Last 24 Hrs  T(C): 36.6 (19 Apr 2018 08:19), Max: 37.1 (18 Apr 2018 19:39)  T(F): 97.9 (19 Apr 2018 08:19), Max: 98.8 (18 Apr 2018 19:39)  HR: 86 (19 Apr 2018 08:19) (63 - 86)  BP: 112/73 (19 Apr 2018 08:19) (112/73 - 135/72)  BP(mean): --  RR: 15 (19 Apr 2018 08:19) (14 - 18)  SpO2: 96% (19 Apr 2018 08:19) (96% - 98%)    GENERAL: NAD, well-groomed, well-developed, awake, alert, oriented x 3, fluent and coherent speech  HEAD:  Atraumatic, Normocephalic  EYES: EOMI, PERRLA, conjunctiva and sclera clear  ENMT: No tonsillar erythema, exudates, or enlargement; Moist mucous membranes, Good dentition, No lesions  NECK: Supple, No JVD, No Cervical LAD, No thyromegaly, No thyroid nodules felt  NERVOUS SYSTEM:  Good concentration; Moving all 4 extremities; No gross sensory deficits, No facial droop  CHEST/LUNG: coarse BS b/l, mild exp wheezes b/l  HEART: Regular rate and rhythm; No murmurs, rubs, or gallops  ABDOMEN: Soft, Nontender, Nondistended, Bowel sounds present, No palpable masses or organomegaly, No bruits  EXTREMITIES:  2+ Peripheral Pulses, No clubbing, cyanosis, or edema  LYMPH: No lymphadenopathy noted    LABS:                        12.3   14.9  )-----------( 355      ( 19 Apr 2018 06:05 )             35.8   04-19    141  |  106  |  6<L>  ----------------------------<  129<H>  3.1<L>   |  25  |  0.54    Ca    9.0      19 Apr 2018 06:05    TPro  8.3  /  Alb  4.0  /  TBili  0.4  /  DBili  x   /  AST  36  /  ALT  32  /  AlkPhos  88  04-17

## 2018-04-19 NOTE — PROGRESS NOTE ADULT - PROBLEM SELECTOR PLAN 4
- Stopped taking Metformin because her sugars improved with diet control  - HgbA1c pending  - continue SSI for now  - current hyperglycemia likely due to steroids - better on recent readings

## 2018-04-19 NOTE — PROGRESS NOTE ADULT - PROBLEM SELECTOR PLAN 3
- triggered by resp infection  - continue Symbicort, Prednisone, Nebs, Singulair  - Will discuss discharge meds for Asthma with Pulm  - supplemental oxygen PRN

## 2018-04-20 ENCOUNTER — TRANSCRIPTION ENCOUNTER (OUTPATIENT)
Age: 58
End: 2018-04-20

## 2018-04-20 VITALS
RESPIRATION RATE: 18 BRPM | DIASTOLIC BLOOD PRESSURE: 66 MMHG | TEMPERATURE: 98 F | SYSTOLIC BLOOD PRESSURE: 114 MMHG | OXYGEN SATURATION: 96 %

## 2018-04-20 LAB
ANION GAP SERPL CALC-SCNC: 8 MMOL/L — SIGNIFICANT CHANGE UP (ref 5–17)
BUN SERPL-MCNC: 10 MG/DL — SIGNIFICANT CHANGE UP (ref 7–23)
CALCIUM SERPL-MCNC: 9.3 MG/DL — SIGNIFICANT CHANGE UP (ref 8.4–10.5)
CHLORIDE SERPL-SCNC: 104 MMOL/L — SIGNIFICANT CHANGE UP (ref 96–108)
CO2 SERPL-SCNC: 27 MMOL/L — SIGNIFICANT CHANGE UP (ref 22–31)
CREAT SERPL-MCNC: 0.71 MG/DL — SIGNIFICANT CHANGE UP (ref 0.5–1.3)
GLUCOSE SERPL-MCNC: 120 MG/DL — HIGH (ref 70–99)
HCT VFR BLD CALC: 37.8 % — SIGNIFICANT CHANGE UP (ref 34.5–45)
HGB BLD-MCNC: 13.1 G/DL — SIGNIFICANT CHANGE UP (ref 11.5–15.5)
MAGNESIUM SERPL-MCNC: 2.3 MG/DL — SIGNIFICANT CHANGE UP (ref 1.6–2.6)
MCHC RBC-ENTMCNC: 29.8 PG — SIGNIFICANT CHANGE UP (ref 27–34)
MCHC RBC-ENTMCNC: 34.7 GM/DL — SIGNIFICANT CHANGE UP (ref 32–36)
MCV RBC AUTO: 85.9 FL — SIGNIFICANT CHANGE UP (ref 80–100)
PLATELET # BLD AUTO: 355 K/UL — SIGNIFICANT CHANGE UP (ref 150–400)
POTASSIUM SERPL-MCNC: 3.8 MMOL/L — SIGNIFICANT CHANGE UP (ref 3.5–5.3)
POTASSIUM SERPL-SCNC: 3.8 MMOL/L — SIGNIFICANT CHANGE UP (ref 3.5–5.3)
RBC # BLD: 4.4 M/UL — SIGNIFICANT CHANGE UP (ref 3.8–5.2)
RBC # FLD: 12.8 % — SIGNIFICANT CHANGE UP (ref 10.3–14.5)
SODIUM SERPL-SCNC: 139 MMOL/L — SIGNIFICANT CHANGE UP (ref 135–145)
WBC # BLD: 13.9 K/UL — HIGH (ref 3.8–10.5)
WBC # FLD AUTO: 13.9 K/UL — HIGH (ref 3.8–10.5)

## 2018-04-20 PROCEDURE — 84443 ASSAY THYROID STIM HORMONE: CPT

## 2018-04-20 PROCEDURE — 94664 DEMO&/EVAL PT USE INHALER: CPT

## 2018-04-20 PROCEDURE — 83036 HEMOGLOBIN GLYCOSYLATED A1C: CPT

## 2018-04-20 PROCEDURE — 83605 ASSAY OF LACTIC ACID: CPT

## 2018-04-20 PROCEDURE — 85610 PROTHROMBIN TIME: CPT

## 2018-04-20 PROCEDURE — 99239 HOSP IP/OBS DSCHRG MGMT >30: CPT

## 2018-04-20 PROCEDURE — 85027 COMPLETE CBC AUTOMATED: CPT

## 2018-04-20 PROCEDURE — 84436 ASSAY OF TOTAL THYROXINE: CPT

## 2018-04-20 PROCEDURE — 85730 THROMBOPLASTIN TIME PARTIAL: CPT

## 2018-04-20 PROCEDURE — 71046 X-RAY EXAM CHEST 2 VIEWS: CPT

## 2018-04-20 PROCEDURE — 87798 DETECT AGENT NOS DNA AMP: CPT

## 2018-04-20 PROCEDURE — 87633 RESP VIRUS 12-25 TARGETS: CPT

## 2018-04-20 PROCEDURE — 71250 CT THORAX DX C-: CPT

## 2018-04-20 PROCEDURE — 84145 PROCALCITONIN (PCT): CPT

## 2018-04-20 PROCEDURE — 83880 ASSAY OF NATRIURETIC PEPTIDE: CPT

## 2018-04-20 PROCEDURE — 85048 AUTOMATED LEUKOCYTE COUNT: CPT

## 2018-04-20 PROCEDURE — 96374 THER/PROPH/DIAG INJ IV PUSH: CPT

## 2018-04-20 PROCEDURE — 94640 AIRWAY INHALATION TREATMENT: CPT

## 2018-04-20 PROCEDURE — 80048 BASIC METABOLIC PNL TOTAL CA: CPT

## 2018-04-20 PROCEDURE — 81001 URINALYSIS AUTO W/SCOPE: CPT

## 2018-04-20 PROCEDURE — 83735 ASSAY OF MAGNESIUM: CPT

## 2018-04-20 PROCEDURE — 99285 EMERGENCY DEPT VISIT HI MDM: CPT | Mod: 25

## 2018-04-20 PROCEDURE — 82785 ASSAY OF IGE: CPT

## 2018-04-20 PROCEDURE — 87581 M.PNEUMON DNA AMP PROBE: CPT

## 2018-04-20 PROCEDURE — 87486 CHLMYD PNEUM DNA AMP PROBE: CPT

## 2018-04-20 PROCEDURE — 82962 GLUCOSE BLOOD TEST: CPT

## 2018-04-20 PROCEDURE — 80053 COMPREHEN METABOLIC PANEL: CPT

## 2018-04-20 PROCEDURE — 84480 ASSAY TRIIODOTHYRONINE (T3): CPT

## 2018-04-20 PROCEDURE — 36415 COLL VENOUS BLD VENIPUNCTURE: CPT

## 2018-04-20 PROCEDURE — 93005 ELECTROCARDIOGRAM TRACING: CPT

## 2018-04-20 PROCEDURE — 86140 C-REACTIVE PROTEIN: CPT

## 2018-04-20 PROCEDURE — 87040 BLOOD CULTURE FOR BACTERIA: CPT

## 2018-04-20 RX ORDER — LISINOPRIL 2.5 MG/1
1 TABLET ORAL
Qty: 90 | Refills: 1
Start: 2018-04-20 | End: 2018-10-16

## 2018-04-20 RX ORDER — BUDESONIDE AND FORMOTEROL FUMARATE DIHYDRATE 160; 4.5 UG/1; UG/1
2 AEROSOL RESPIRATORY (INHALATION)
Qty: 1 | Refills: 2
Start: 2018-04-20 | End: 2018-07-18

## 2018-04-20 RX ORDER — AMOXICILLIN 250 MG/5ML
1 SUSPENSION, RECONSTITUTED, ORAL (ML) ORAL
Qty: 0 | Refills: 0 | COMMUNITY

## 2018-04-20 RX ORDER — MONTELUKAST 4 MG/1
1 TABLET, CHEWABLE ORAL
Qty: 0 | Refills: 0 | COMMUNITY

## 2018-04-20 RX ORDER — ALBUTEROL 90 UG/1
2 AEROSOL, METERED ORAL
Qty: 1 | Refills: 0
Start: 2018-04-20 | End: 2018-04-21

## 2018-04-20 RX ORDER — ALBUTEROL 90 UG/1
2 AEROSOL, METERED ORAL
Qty: 0 | Refills: 0 | COMMUNITY

## 2018-04-20 RX ORDER — MONTELUKAST 4 MG/1
1 TABLET, CHEWABLE ORAL
Qty: 60 | Refills: 2
Start: 2018-04-20 | End: 2018-10-16

## 2018-04-20 RX ADMIN — Medication 600 MILLIGRAM(S): at 06:28

## 2018-04-20 RX ADMIN — Medication 20 MILLIGRAM(S): at 06:28

## 2018-04-20 RX ADMIN — HEPARIN SODIUM 5000 UNIT(S): 5000 INJECTION INTRAVENOUS; SUBCUTANEOUS at 06:28

## 2018-04-20 RX ADMIN — LISINOPRIL 10 MILLIGRAM(S): 2.5 TABLET ORAL at 06:28

## 2018-04-20 RX ADMIN — BUDESONIDE AND FORMOTEROL FUMARATE DIHYDRATE 2 PUFF(S): 160; 4.5 AEROSOL RESPIRATORY (INHALATION) at 06:28

## 2018-04-20 RX ADMIN — ALBUTEROL 2.5 MILLIGRAM(S): 90 AEROSOL, METERED ORAL at 07:39

## 2018-04-20 RX ADMIN — Medication 81 MILLIGRAM(S): at 11:19

## 2018-04-20 NOTE — DISCHARGE NOTE ADULT - SECONDARY DIAGNOSIS.
Exacerbation of intermittent asthma, unspecified asthma severity Type 2 diabetes mellitus without complication, without long-term current use of insulin Essential hypertension

## 2018-04-20 NOTE — DISCHARGE NOTE ADULT - PATIENT PORTAL LINK FT
You can access the [facility], offered by [facility], by registering with the following website: [email or web address]

## 2018-04-20 NOTE — PROGRESS NOTE ADULT - PROBLEM SELECTOR PLAN 4
- Stopped taking Metformin because her sugars improved with diet control  - HgbA1c pending  - continue SSI for now  - counseled on importance of diet and medication adherence

## 2018-04-20 NOTE — DISCHARGE NOTE ADULT - MEDICATION SUMMARY - MEDICATIONS TO TAKE
I will START or STAY ON the medications listed below when I get home from the hospital:    Deltasone 20 mg oral tablet  -- 1 tab(s) by mouth once a day   -- It is very important that you take or use this exactly as directed.  Do not skip doses or discontinue unless directed by your doctor.  Obtain medical advice before taking any non-prescription drugs as some may affect the action of this medication.  Take with food or milk.    -- Indication: For asthma exacerbation    aspirin 81 mg oral tablet, chewable  -- 1 tab(s) by mouth once a day  -- Indication: For Primary prevention    lisinopril 10 mg oral tablet  -- 1 tab(s) by mouth once a day   -- Do not take this drug if you are pregnant.  It is very important that you take or use this exactly as directed.  Do not skip doses or discontinue unless directed by your doctor.  Some non-prescription drugs may aggravate your condition.  Read all labels carefully.  If a warning appears, check with your doctor before taking.    -- Indication: For Hypertension    Ventolin HFA 90 mcg/inh inhalation aerosol  -- 2 puff(s) inhaled 4 times a day, As Needed -for shortness of breath and/or wheezing   -- Indication: For asthma    Symbicort 80 mcg-4.5 mcg/inh inhalation aerosol  -- 2 puff(s) inhaled 2 times a day   -- Check with your doctor before becoming pregnant.  For inhalation only.  Rinse mouth thoroughly after use.    -- Indication: For asthma    Singulair 10 mg oral tablet  -- 1 tab(s) by mouth once a day  -- Indication: For ashtma    amoxicillin-clavulanate 875 mg-125 mg oral tablet  -- 1 tab(s) by mouth every 12 hours   -- Finish all this medication unless otherwise directed by prescriber.  Take with food or milk.    -- Indication: For Pneumonia I will START or STAY ON the medications listed below when I get home from the hospital:    Deltasone 20 mg oral tablet  -- 1 tab(s) by mouth once a day   -- It is very important that you take or use this exactly as directed.  Do not skip doses or discontinue unless directed by your doctor.  Obtain medical advice before taking any non-prescription drugs as some may affect the action of this medication.  Take with food or milk.    -- Indication: For asthma exacerbation    aspirin 81 mg oral tablet, chewable  -- 1 tab(s) by mouth once a day  -- Indication: For Primary prevention    lisinopril 10 mg oral tablet  -- 1 tab(s) by mouth once a day   -- Do not take this drug if you are pregnant.  It is very important that you take or use this exactly as directed.  Do not skip doses or discontinue unless directed by your doctor.  Some non-prescription drugs may aggravate your condition.  Read all labels carefully.  If a warning appears, check with your doctor before taking.    -- Indication: For Hypertension    metFORMIN 500 mg oral tablet, extended release  -- 1 tab(s) by mouth once a day  -- Indication: For diabetes mellitus    Symbicort 80 mcg-4.5 mcg/inh inhalation aerosol  -- 2 puff(s) inhaled 2 times a day   -- Check with your doctor before becoming pregnant.  For inhalation only.  Rinse mouth thoroughly after use.    -- Indication: For asthma    Ventolin HFA 90 mcg/inh inhalation aerosol  -- 2 puff(s) inhaled 4 times a day, As Needed -for shortness of breath and/or wheezing   -- Indication: For asthma    Singulair 10 mg oral tablet  -- 1 tab(s) by mouth once a day  -- Indication: For ashtma    amoxicillin-clavulanate 875 mg-125 mg oral tablet  -- 1 tab(s) by mouth every 12 hours   -- Finish all this medication unless otherwise directed by prescriber.  Take with food or milk.    -- Indication: For Pneumonia

## 2018-04-20 NOTE — PROGRESS NOTE ADULT - SUBJECTIVE AND OBJECTIVE BOX
Date/Time Patient Seen:  		  Referring MD:   Data Reviewed	       Patient is a 58y old  Female who presents with a chief complaint of SOB. (17 Apr 2018 22:57)  in bed  seen and examined  vs and meds reviewed  on ABX      Subjective/HPI     PAST MEDICAL & SURGICAL HISTORY:  Thyroid activity decreased  Hydronephrosis: right  Herpes simplex  DM (diabetes mellitus)  Impaired fasting glucose  HLD (hyperlipidemia)  GERD (gastroesophageal reflux disease)  Asthma  Hydronephrosis, unspecified hydronephrosis type  Calculus of kidney  Hypertension  H/O lithotripsy  S/P LASIK surgery: left eye  H/O tubal ligation        Medication list         MEDICATIONS  (STANDING):  ALBUTerol    0.083% 2.5 milliGRAM(s) Nebulizer every 8 hours  aspirin  chewable 81 milliGRAM(s) Oral daily  azithromycin  IVPB 500 milliGRAM(s) IV Intermittent every 24 hours  buDESOnide  80 MICROgram(s)/formoterol 4.5 MICROgram(s) Inhaler 2 Puff(s) Inhalation two times a day  cefTRIAXone   IVPB 1 Gram(s) IV Intermittent every 24 hours  dextrose 5%. 1000 milliLiter(s) (50 mL/Hr) IV Continuous <Continuous>  dextrose 50% Injectable 12.5 Gram(s) IV Push once  dextrose 50% Injectable 25 Gram(s) IV Push once  dextrose 50% Injectable 25 Gram(s) IV Push once  guaiFENesin  milliGRAM(s) Oral every 12 hours  heparin  Injectable 5000 Unit(s) SubCutaneous every 8 hours  insulin lispro (HumaLOG) corrective regimen sliding scale   SubCutaneous three times a day before meals  insulin lispro (HumaLOG) corrective regimen sliding scale   SubCutaneous at bedtime  lisinopril 10 milliGRAM(s) Oral daily  montelukast 10 milliGRAM(s) Oral at bedtime  predniSONE   Tablet 20 milliGRAM(s) Oral daily    MEDICATIONS  (PRN):  dextrose Gel 1 Dose(s) Oral once PRN Blood Glucose LESS THAN 70 milliGRAM(s)/deciliter  glucagon  Injectable 1 milliGRAM(s) IntraMuscular once PRN Glucose LESS THAN 70 milligrams/deciliter         Vitals log        ICU Vital Signs Last 24 Hrs  T(C): 36.7 (20 Apr 2018 05:41), Max: 37 (20 Apr 2018 01:06)  T(F): 98 (20 Apr 2018 05:41), Max: 98.6 (20 Apr 2018 01:06)  HR: 71 (20 Apr 2018 05:41) (65 - 91)  BP: 149/88 (20 Apr 2018 05:41) (107/71 - 149/88)  BP(mean): --  ABP: --  ABP(mean): --  RR: 18 (20 Apr 2018 05:41) (15 - 18)  SpO2: 97% (20 Apr 2018 05:41) (94% - 97%)           Input and Output:  I&O's Detail    19 Apr 2018 07:01  -  20 Apr 2018 06:21  --------------------------------------------------------  IN:    IV PiggyBack: 250 mL    Oral Fluid: 240 mL  Total IN: 490 mL    OUT:  Total OUT: 0 mL    Total NET: 490 mL          Lab Data                        12.3   14.9  )-----------( 355      ( 19 Apr 2018 06:05 )             35.8     04-19    141  |  106  |  6<L>  ----------------------------<  129<H>  3.1<L>   |  25  |  0.54    Ca    9.0      19 Apr 2018 06:05              Review of Systems	      Objective     Physical Examination      obese, head at, heart s1s2, lung dec BS, no wheeze   cn grossly int    Pertinent Lab findings & Imaging      Dave:  NO   Adequate UO     I&O's Detail    19 Apr 2018 07:01  -  20 Apr 2018 06:21  --------------------------------------------------------  IN:    IV PiggyBack: 250 mL    Oral Fluid: 240 mL  Total IN: 490 mL    OUT:  Total OUT: 0 mL    Total NET: 490 mL               Discussed with:     Cultures:	        Radiology

## 2018-04-20 NOTE — DISCHARGE NOTE ADULT - HOSPITAL COURSE
HPI:  This is a 57 y/o F with PMH of HTN, DM, Dyslipidemia Asthma, and Nephrolithiasis with Hydronephrosis s/p Lithotripsy presented with SOB. As per patient'  at the bedside, she was ok till a week ago, never had an asthma episode for more than 20 years since they came from [facility] to [place], but a week ago she started to have progressive SOB, after attending a party, for 2 days, so she saw her PMD (5 days ago) who put her on an antibiotic (Amoxacillin) and an inhaler, but this didn't help much, and she started getting dyspepsia & abdominal discomfort from the antibiotic, meanwhile she started getting frequent cough productive of yellowish thick scanty non bloody sputum so they decided to come to the hospital. No fever or chills, no diarrhea or vomiting. No history or recent travel. (17 Apr 2018 22:57)    Seen and examined on day of discharge (see progress note).  Symptoms much improved.  Stable for DC on prednisone for 4 more days (total 7), Antibiotic  for 2 more days (total 5), singulair, symbicort HPI:  This is a 59 y/o F with PMH of HTN, DM, Dyslipidemia Asthma, and Nephrolithiasis with Hydronephrosis s/p Lithotripsy presented with SOB. As per patient'  at the bedside, she was ok till a week ago, never had an asthma episode for more than 20 years since they came from [place], but a week ago she started to have progressive SOB, after attending a party, for 2 days, so she saw her PMD (5 days ago) who put her on an antibiotic (Amoxacillin) and an inhaler, but this didn't help much, and she started getting dyspepsia & abdominal discomfort from the antibiotic, meanwhile she started getting frequent cough productive of yellowish thick scanty non bloody sputum so they decided to come to the hospital. No fever or chills, no diarrhea or vomiting. No history or recent travel. (17 Apr 2018 22:57)    Seen and examined on day of discharge (see progress note).  Symptoms much improved.  Stable for DC on prednisone for 4 more days (total 7), Antibiotic  for 2 more days (total 5), singulair, symbicort, albuterol PRN.  HTN - amlodipine stopped, lisinopril started for renal protective effect  DM - continue metformin 500mg Daily

## 2018-04-20 NOTE — PROGRESS NOTE ADULT - ASSESSMENT
57 y/o F with PMH of HTN, DM, Dyslipidemia Asthma, and Nephrolithiasis with Hydronephrosis s/p Lithotripsy presented with SOB, was found to have PNA.
59 y/o F with PMH of HTN, DM, Dyslipidemia Asthma, and Nephrolithiasis with Hydronephrosis s/p Lithotripsy presented with SOB, was found to have PNA.
59 y/o F with PMH of HTN, DM, Dyslipidemia Asthma, and Nephrolithiasis with Hydronephrosis s/p Lithotripsy presented with SOB, was found to have PNA.

## 2018-04-20 NOTE — PROGRESS NOTE ADULT - PROBLEM SELECTOR PROBLEM 1
Pulmonary congestion
CAP (community acquired pneumonia)

## 2018-04-20 NOTE — PROGRESS NOTE ADULT - PROBLEM SELECTOR PLAN 6
- no diarrhea/vomiting  - unclear etiology, ? due to medications  - supplemented PO  - repeat in AM and check Mag - resolved

## 2018-04-20 NOTE — PROGRESS NOTE ADULT - SUBJECTIVE AND OBJECTIVE BOX
INTERVAL HPI/OVERNIGHT EVENTS:   Patient seen and examined.  Dyspnea and cough much improved.  Ambulating without difficulty.  No fevers, chills, sweats, dizziness, ha, cp, n/v/d, abd pain, dysuria, calf pain, or focal weakness.    REVIEW OF SYSTEMS:  See HPI,  all others negative    PHYSICAL EXAM:  Vital Signs Last 24 Hrs  T(C): 36.9 (20 Apr 2018 09:55), Max: 37 (20 Apr 2018 01:06)  T(F): 98.4 (20 Apr 2018 09:55), Max: 98.6 (20 Apr 2018 01:06)  HR: 74 (20 Apr 2018 07:39) (71 - 91)  BP: 114/66 (20 Apr 2018 09:55) (107/71 - 149/88)  BP(mean): --  RR: 18 (20 Apr 2018 09:55) (16 - 18)  SpO2: 96% (20 Apr 2018 09:55) (94% - 97%)    GENERAL: NAD, well-groomed, well-developed, awake, alert, oriented x 3, fluent and coherent speech  HEAD:  Atraumatic, Normocephalic  EYES: EOMI, PERRLA, conjunctiva and sclera clear  ENMT: No tonsillar erythema, exudates, or enlargement; Moist mucous membranes, Good dentition, No lesions  NECK: Supple, No JVD, No Cervical LAD, No thyromegaly, No thyroid nodules felt  NERVOUS SYSTEM:  Good concentration; Moving all 4 extremities 5/5 strength; No gross sensory deficits, No facial droop  CHEST/LUNG: coarse BS b/l, no wheezing  HEART: Regular rate and rhythm; No murmurs, rubs, or gallops  ABDOMEN: Soft, Nontender, Nondistended, Bowel sounds present, No palpable masses or organomegaly, No bruits  EXTREMITIES:  2+ Peripheral Pulses, No clubbing, cyanosis, or edema  LYMPH: No lymphadenopathy noted    LABS:                                   13.1   13.9  )-----------( 355      ( 20 Apr 2018 06:42 )             37.8   04-20    139  |  104  |  10  ----------------------------<  120<H>  3.8   |  27  |  0.71    Ca    9.3      20 Apr 2018 06:42  Mg     2.3     04-20

## 2018-04-20 NOTE — DISCHARGE NOTE ADULT - MEDICATION SUMMARY - MEDICATIONS TO STOP TAKING
I will STOP taking the medications listed below when I get home from the hospital:    amLODIPine 5 mg oral tablet  -- 1 tab(s) by mouth once a day in pm    amoxicillin 500 mg oral capsule  -- 1 cap(s) by mouth 3 times a day

## 2018-04-20 NOTE — DISCHARGE NOTE ADULT - PLAN OF CARE
prevent recurrence - due to Human Metapneumovirus   - possible superimposed bacterial infection  - 2 more days of Antibiotic - Augmentin x 2 days  - avoid contact with children for 1 week - Albuterol as needed  - Symbicort  - Singulair  - Prednisone for 4 more days  - Ask your primary doctor for referral to a Pulmonologist as soon as possible - continue metformin - stop amlodipine  - use Lisinopril instead

## 2018-04-20 NOTE — PROGRESS NOTE ADULT - PROBLEM SELECTOR PLAN 1
resp viral infection  poss PNA  cont ABX  dec IVF rate, pt has hx of HFpEF  cont NEBS and Singulair and Inhaler regimen - Symbicort  cont Systemic Steroids at current dose, monitor FS  isolation precs for Metapneumovirus infection  cough regimen - Mucinex  IgE and CRP and Eosinophil count pending  increase activity as tolerated  CT chest reviewed  DM care  HTN cvs regime  dietary discretion
resp viral infection and pneumonia  oral and skin care  on emp ABX  on oral systemic steroids  IgE elev, cont Singulair and Asthma regimen, Symbicort and NEBS  monitor vs and HD and Sat, keep sat > 88 pct  mobilize
resp viral infection, supportive care and regimen  asthma - nebs and inhaler regimen, singulair  poss Lower resp tract infection - on dual ABX - would complete total 5 days of ABX  on Oral Systemic Steroids - complete 7 days and then dc all together  increase activity  assess sat on exertion on room air  dc planning
- Possibly bacterial on top of viral  - positive RVP for hMPV - contact & droplet precautions  - Procalcitonin wnl  - Complete short ABX course 5 days total  - Pulm F/U reviewed  - Ambulated in room back and forth on room air - oxygen sat 96% after (checked by me at bedside)
- Possibly bacterial on top of viral  - positive RVP for hMPV - contact & droplet precautions, started on Ceftriaxone   - Ceftriaxone + Azithro  - Procalcitonin pending  - Pulm Consult Appreciated  - Felt dyspneic with ambulation today  - DC planning for tomorrow
- Possibly bacterial on top of viral  - positive RVP for hMPV - contact & droplet precautions, started on Ceftriaxone   - Ceftriaxone + Azithro  - Check procalcitonin  - Pulm Consult Appreciated

## 2018-04-20 NOTE — DISCHARGE NOTE ADULT - CARE PLAN
Principal Discharge DX:	Pneumonia due to infectious organism, unspecified laterality, unspecified part of lung  Goal:	prevent recurrence  Assessment and plan of treatment:	- due to Human Metapneumovirus   - possible superimposed bacterial infection  - 2 more days of Antibiotic - Augmentin x 2 days  - avoid contact with children for 1 week  Secondary Diagnosis:	Exacerbation of intermittent asthma, unspecified asthma severity  Assessment and plan of treatment:	- Albuterol as needed  - Symbicort  - Singulair  - Prednisone for 4 more days  - Ask your primary doctor for referral to a Pulmonologist as soon as possible  Secondary Diagnosis:	Type 2 diabetes mellitus without complication, without long-term current use of insulin  Assessment and plan of treatment:	- continue metformin  Secondary Diagnosis:	Essential hypertension  Assessment and plan of treatment:	- stop amlodipine  - use Lisinopril instead

## 2018-04-22 LAB
CULTURE RESULTS: SIGNIFICANT CHANGE UP
SPECIMEN SOURCE: SIGNIFICANT CHANGE UP

## 2018-04-23 LAB
CULTURE RESULTS: SIGNIFICANT CHANGE UP
SPECIMEN SOURCE: SIGNIFICANT CHANGE UP

## 2018-04-27 ENCOUNTER — INPATIENT (INPATIENT)
Facility: HOSPITAL | Age: 58
LOS: 0 days | Discharge: HOME | DRG: 392 | End: 2018-04-28
Attending: INTERNAL MEDICINE | Admitting: INTERNAL MEDICINE
Payer: COMMERCIAL

## 2018-04-27 VITALS
SYSTOLIC BLOOD PRESSURE: 154 MMHG | DIASTOLIC BLOOD PRESSURE: 82 MMHG | HEART RATE: 109 BPM | TEMPERATURE: 99 F | HEIGHT: 64 IN | OXYGEN SATURATION: 98 % | WEIGHT: 164.02 LBS | RESPIRATION RATE: 20 BRPM

## 2018-04-27 DIAGNOSIS — Z98.89 OTHER SPECIFIED POSTPROCEDURAL STATES: Chronic | ICD-10-CM

## 2018-04-27 DIAGNOSIS — I24.9 ACUTE ISCHEMIC HEART DISEASE, UNSPECIFIED: ICD-10-CM

## 2018-04-27 DIAGNOSIS — Z98.51 TUBAL LIGATION STATUS: Chronic | ICD-10-CM

## 2018-04-27 LAB
ALBUMIN SERPL ELPH-MCNC: 3.8 G/DL — SIGNIFICANT CHANGE UP (ref 3.3–5)
ALP SERPL-CCNC: 84 U/L — SIGNIFICANT CHANGE UP (ref 30–120)
ALT FLD-CCNC: 34 U/L DA — SIGNIFICANT CHANGE UP (ref 10–60)
ANION GAP SERPL CALC-SCNC: 15 MMOL/L — SIGNIFICANT CHANGE UP (ref 5–17)
AST SERPL-CCNC: 20 U/L — SIGNIFICANT CHANGE UP (ref 10–40)
BASOPHILS # BLD AUTO: 0.1 K/UL — SIGNIFICANT CHANGE UP (ref 0–0.2)
BASOPHILS NFR BLD AUTO: 1.1 % — SIGNIFICANT CHANGE UP (ref 0–2)
BILIRUB SERPL-MCNC: 0.3 MG/DL — SIGNIFICANT CHANGE UP (ref 0.2–1.2)
BUN SERPL-MCNC: 8 MG/DL — SIGNIFICANT CHANGE UP (ref 7–23)
CALCIUM SERPL-MCNC: 9.5 MG/DL — SIGNIFICANT CHANGE UP (ref 8.4–10.5)
CHLORIDE SERPL-SCNC: 99 MMOL/L — SIGNIFICANT CHANGE UP (ref 96–108)
CK MB BLD-MCNC: 1.4 % — SIGNIFICANT CHANGE UP (ref 0–3.5)
CK MB CFR SERPL CALC: 1 NG/ML — SIGNIFICANT CHANGE UP (ref 0–3.6)
CK SERPL-CCNC: 73 U/L — SIGNIFICANT CHANGE UP (ref 26–192)
CO2 SERPL-SCNC: 23 MMOL/L — SIGNIFICANT CHANGE UP (ref 22–31)
CREAT SERPL-MCNC: 0.75 MG/DL — SIGNIFICANT CHANGE UP (ref 0.5–1.3)
D DIMER BLD IA.RAPID-MCNC: <150 NG/ML DDU — SIGNIFICANT CHANGE UP
EOSINOPHIL # BLD AUTO: 0.6 K/UL — HIGH (ref 0–0.5)
EOSINOPHIL NFR BLD AUTO: 4.4 % — SIGNIFICANT CHANGE UP (ref 0–6)
GLUCOSE SERPL-MCNC: 171 MG/DL — HIGH (ref 70–99)
HCT VFR BLD CALC: 41.2 % — SIGNIFICANT CHANGE UP (ref 34.5–45)
HGB BLD-MCNC: 13.9 G/DL — SIGNIFICANT CHANGE UP (ref 11.5–15.5)
LYMPHOCYTES # BLD AUTO: 28.4 % — SIGNIFICANT CHANGE UP (ref 13–44)
LYMPHOCYTES # BLD AUTO: 3.7 K/UL — HIGH (ref 1–3.3)
MCHC RBC-ENTMCNC: 29.3 PG — SIGNIFICANT CHANGE UP (ref 27–34)
MCHC RBC-ENTMCNC: 33.7 GM/DL — SIGNIFICANT CHANGE UP (ref 32–36)
MCV RBC AUTO: 87 FL — SIGNIFICANT CHANGE UP (ref 80–100)
MONOCYTES # BLD AUTO: 0.5 K/UL — SIGNIFICANT CHANGE UP (ref 0–0.9)
MONOCYTES NFR BLD AUTO: 3.9 % — SIGNIFICANT CHANGE UP (ref 2–14)
NEUTROPHILS # BLD AUTO: 8.1 K/UL — HIGH (ref 1.8–7.4)
NEUTROPHILS NFR BLD AUTO: 62.2 % — SIGNIFICANT CHANGE UP (ref 43–77)
NT-PROBNP SERPL-SCNC: 37 PG/ML — SIGNIFICANT CHANGE UP (ref 0–125)
PLATELET # BLD AUTO: 361 K/UL — SIGNIFICANT CHANGE UP (ref 150–400)
POTASSIUM SERPL-MCNC: 3.9 MMOL/L — SIGNIFICANT CHANGE UP (ref 3.5–5.3)
POTASSIUM SERPL-SCNC: 3.9 MMOL/L — SIGNIFICANT CHANGE UP (ref 3.5–5.3)
PROT SERPL-MCNC: 7.8 G/DL — SIGNIFICANT CHANGE UP (ref 6–8.3)
RBC # BLD: 4.74 M/UL — SIGNIFICANT CHANGE UP (ref 3.8–5.2)
RBC # FLD: 12.6 % — SIGNIFICANT CHANGE UP (ref 10.3–14.5)
SODIUM SERPL-SCNC: 137 MMOL/L — SIGNIFICANT CHANGE UP (ref 135–145)
TROPONIN I SERPL-MCNC: 0 NG/ML — LOW (ref 0.02–0.06)
TROPONIN I SERPL-MCNC: 0 NG/ML — LOW (ref 0.02–0.06)
WBC # BLD: 12.9 K/UL — HIGH (ref 3.8–10.5)
WBC # FLD AUTO: 12.9 K/UL — HIGH (ref 3.8–10.5)

## 2018-04-27 PROCEDURE — 71045 X-RAY EXAM CHEST 1 VIEW: CPT | Mod: 26

## 2018-04-27 PROCEDURE — 99285 EMERGENCY DEPT VISIT HI MDM: CPT

## 2018-04-27 PROCEDURE — 99291 CRITICAL CARE FIRST HOUR: CPT

## 2018-04-27 PROCEDURE — 99223 1ST HOSP IP/OBS HIGH 75: CPT

## 2018-04-27 RX ORDER — BUDESONIDE AND FORMOTEROL FUMARATE DIHYDRATE 160; 4.5 UG/1; UG/1
2 AEROSOL RESPIRATORY (INHALATION)
Qty: 0 | Refills: 0 | Status: DISCONTINUED | OUTPATIENT
Start: 2018-04-27 | End: 2018-04-28

## 2018-04-27 RX ORDER — ALBUTEROL 90 UG/1
2 AEROSOL, METERED ORAL EVERY 6 HOURS
Qty: 0 | Refills: 0 | Status: DISCONTINUED | OUTPATIENT
Start: 2018-04-27 | End: 2018-04-28

## 2018-04-27 RX ORDER — SODIUM CHLORIDE 9 MG/ML
1000 INJECTION, SOLUTION INTRAVENOUS
Qty: 0 | Refills: 0 | Status: DISCONTINUED | OUTPATIENT
Start: 2018-04-27 | End: 2018-04-28

## 2018-04-27 RX ORDER — ASPIRIN/CALCIUM CARB/MAGNESIUM 324 MG
81 TABLET ORAL DAILY
Qty: 0 | Refills: 0 | Status: DISCONTINUED | OUTPATIENT
Start: 2018-04-27 | End: 2018-04-28

## 2018-04-27 RX ORDER — NITROGLYCERIN 6.5 MG
0.4 CAPSULE, EXTENDED RELEASE ORAL ONCE
Qty: 0 | Refills: 0 | Status: COMPLETED | OUTPATIENT
Start: 2018-04-27 | End: 2018-04-27

## 2018-04-27 RX ORDER — DEXTROSE 50 % IN WATER 50 %
25 SYRINGE (ML) INTRAVENOUS ONCE
Qty: 0 | Refills: 0 | Status: DISCONTINUED | OUTPATIENT
Start: 2018-04-27 | End: 2018-04-28

## 2018-04-27 RX ORDER — ALPRAZOLAM 0.25 MG
0.25 TABLET ORAL ONCE
Qty: 0 | Refills: 0 | Status: DISCONTINUED | OUTPATIENT
Start: 2018-04-27 | End: 2018-04-27

## 2018-04-27 RX ORDER — MONTELUKAST 4 MG/1
10 TABLET, CHEWABLE ORAL DAILY
Qty: 0 | Refills: 0 | Status: DISCONTINUED | OUTPATIENT
Start: 2018-04-27 | End: 2018-04-28

## 2018-04-27 RX ORDER — SODIUM CHLORIDE 9 MG/ML
3 INJECTION INTRAMUSCULAR; INTRAVENOUS; SUBCUTANEOUS ONCE
Qty: 0 | Refills: 0 | Status: COMPLETED | OUTPATIENT
Start: 2018-04-27 | End: 2018-04-27

## 2018-04-27 RX ORDER — NITROGLYCERIN 6.5 MG
0.4 CAPSULE, EXTENDED RELEASE ORAL
Qty: 0 | Refills: 0 | Status: DISCONTINUED | OUTPATIENT
Start: 2018-04-27 | End: 2018-04-28

## 2018-04-27 RX ORDER — HEPARIN SODIUM 5000 [USP'U]/ML
5000 INJECTION INTRAVENOUS; SUBCUTANEOUS EVERY 8 HOURS
Qty: 0 | Refills: 0 | Status: DISCONTINUED | OUTPATIENT
Start: 2018-04-27 | End: 2018-04-28

## 2018-04-27 RX ORDER — DEXTROSE 50 % IN WATER 50 %
1 SYRINGE (ML) INTRAVENOUS ONCE
Qty: 0 | Refills: 0 | Status: DISCONTINUED | OUTPATIENT
Start: 2018-04-27 | End: 2018-04-28

## 2018-04-27 RX ORDER — ASPIRIN/CALCIUM CARB/MAGNESIUM 324 MG
325 TABLET ORAL ONCE
Qty: 0 | Refills: 0 | Status: COMPLETED | OUTPATIENT
Start: 2018-04-27 | End: 2018-04-27

## 2018-04-27 RX ORDER — LISINOPRIL 2.5 MG/1
10 TABLET ORAL DAILY
Qty: 0 | Refills: 0 | Status: DISCONTINUED | OUTPATIENT
Start: 2018-04-27 | End: 2018-04-28

## 2018-04-27 RX ORDER — METFORMIN HYDROCHLORIDE 850 MG/1
500 TABLET ORAL DAILY
Qty: 0 | Refills: 0 | Status: DISCONTINUED | OUTPATIENT
Start: 2018-04-27 | End: 2018-04-28

## 2018-04-27 RX ORDER — GLUCAGON INJECTION, SOLUTION 0.5 MG/.1ML
1 INJECTION, SOLUTION SUBCUTANEOUS ONCE
Qty: 0 | Refills: 0 | Status: DISCONTINUED | OUTPATIENT
Start: 2018-04-27 | End: 2018-04-28

## 2018-04-27 RX ORDER — INSULIN LISPRO 100/ML
VIAL (ML) SUBCUTANEOUS AT BEDTIME
Qty: 0 | Refills: 0 | Status: DISCONTINUED | OUTPATIENT
Start: 2018-04-27 | End: 2018-04-28

## 2018-04-27 RX ORDER — METOPROLOL TARTRATE 50 MG
50 TABLET ORAL DAILY
Qty: 0 | Refills: 0 | Status: DISCONTINUED | OUTPATIENT
Start: 2018-04-27 | End: 2018-04-28

## 2018-04-27 RX ORDER — DEXTROSE 50 % IN WATER 50 %
12.5 SYRINGE (ML) INTRAVENOUS ONCE
Qty: 0 | Refills: 0 | Status: DISCONTINUED | OUTPATIENT
Start: 2018-04-27 | End: 2018-04-28

## 2018-04-27 RX ORDER — INSULIN LISPRO 100/ML
VIAL (ML) SUBCUTANEOUS
Qty: 0 | Refills: 0 | Status: DISCONTINUED | OUTPATIENT
Start: 2018-04-27 | End: 2018-04-28

## 2018-04-27 RX ADMIN — Medication 0.4 MILLIGRAM(S): at 18:33

## 2018-04-27 RX ADMIN — Medication 325 MILLIGRAM(S): at 18:33

## 2018-04-27 RX ADMIN — SODIUM CHLORIDE 3 MILLILITER(S): 9 INJECTION INTRAMUSCULAR; INTRAVENOUS; SUBCUTANEOUS at 18:32

## 2018-04-27 RX ADMIN — HEPARIN SODIUM 5000 UNIT(S): 5000 INJECTION INTRAVENOUS; SUBCUTANEOUS at 23:29

## 2018-04-27 RX ADMIN — Medication 0.4 MILLIGRAM(S): at 19:01

## 2018-04-27 RX ADMIN — Medication 0.25 MILLIGRAM(S): at 22:51

## 2018-04-27 NOTE — ED PROVIDER NOTE - PROGRESS NOTE DETAILS
pt felt better, but still had chest pressure p SL NTG x 1 pt felt better.  case dw Palla case dw Meaghan

## 2018-04-27 NOTE — H&P ADULT - PROBLEM SELECTOR PLAN 5
by history, no available lipid profile results, not on any meds for dyslipidemia, had normal initial thyroid profile last admission about 10 days ago, will get fasting lipid profile in am.

## 2018-04-27 NOTE — ED PROVIDER NOTE - CRITICAL CARE PROVIDED
direct patient care (not related to procedure)/documentation/consultation with other physicians/interpretation of diagnostic studies/additional history taking

## 2018-04-27 NOTE — H&P ADULT - PROBLEM SELECTOR PLAN 2
without neutropenia, ML stress related, no signs or symptoms suggestive of an infection, will trend TLC, no antibiotics.

## 2018-04-27 NOTE — H&P ADULT - PROBLEM SELECTOR PLAN 3
Controlled since presentation. As per son, patient is back on Amlodipine again, off Lisinopril that was started by me on previous admission 2 weeks ago, as they thought it is not suitable for her blood pressure, explained to them the renal protective effect of Lisinopril given her DM, and the need to f/u with her PMD after discharge to adjust the dose gradually to the optimum daily dose, they understand & agree, stopped the Amlodipine again and restarted her on Lisinopril 10 mg PO daily in addition to Toprol XL 50 mg daily with hold parameters, will monitor.

## 2018-04-27 NOTE — ED PROVIDER NOTE - OBJECTIVE STATEMENT
59 y/o F pt with hx of asthma, HTN, HLD, diabetes presents to ED with  Kane c/o constant chest heaviness chest  radiating to left shoulder associated with mild SOB starting today. Pain rated 5/10. Pt was seen in ED April 17th admitted for pneumonia and recently discharged.  Per  pt had high blood pressure and took extra dose of Amlodipine. Follow up appointment with PMD. Negative stress test and ECHO in 2016/2017. Cardiologist follow up May 8th. Denies fevers. No further complaints at this time.   PMD: Don  Cardiologist: Dr. arciniega 57 y/o F pt with hx of asthma, HTN, HLD, diabetes presents to ED with  Kane c/o constant chest heaviness chest  radiating to left shoulder associated with mild SOB starting today. Pain rated 5/10. Pt was seen in ED April 17th admitted for pneumonia and recently discharged.  Per  pt had high blood pressure and took extra dose of Amlodipine. Follow up appointment with PMD. Negative stress test and ECHO in 2016/2017. Cardiologist follow up May 8th. Denies fevers. No further complaints at this time.   PMD: Alicia in [facility]  Cardiologist: Dr. arciniega in

## 2018-04-27 NOTE — H&P ADULT - NSHPREVIEWOFSYSTEMS_GEN_ALL_CORE
- -  CONSTITUTIONAL: No fever, weight loss, or fatigue.  EYES: No eye pain, visual disturbances, or discharge.  ENMT:  No difficulty hearing, tinnitus, vertigo; No sinus or throat pain.  NECK: No pain or stiffness.  RESPIRATORY: No cough, wheezing, or hemoptysis; No shortness of breath.  CARDIOVASCULAR: (+) chest discomfort, (+) palpitations, no dizziness, or leg swelling.  GASTROINTESTINAL: No abdominal pain, no nausea, vomiting, or hematemesis; No diarrhea or Change in bowel habits. No melena or hematochezia.  GENITOURINARY: No dysuria, frequency, hematuria, or incontinence.  NEUROLOGICAL: No headaches, focal muscle weakness, numbness, or tremors.  SKIN: No itching, burning or rashes.  MUSCULOSKELETAL: No joint swelling or pain.  PSYCHIATRIC: No depression, anxiety, or agitation.  HEME/LYMPH: No easy bruising, bleeding gums, or nose bleed.  ALLERGY AND IMMUNOLOGIC: No hives or eczema.

## 2018-04-27 NOTE — H&P ADULT - PROBLEM SELECTOR PLAN 1
No EKG changes, negative 1st set of cardiac enzymes, admitted to telemetry unit, pain control PRN, SL nitroglycerine PRN, will start on Toprol XL 50 mg PO daily, continue low dose aspirin, trend cardiac enzymes, get TTE & fasting lipid profile in am, cardiology consult with Dr. Palla was called.

## 2018-04-27 NOTE — H&P ADULT - HISTORY OF PRESENT ILLNESS
This is a 57 y/o F with PMH of HTN, DM, Dyslipidemia Asthma, and Nephrolithiasis with Hydronephrosis s/p Lithotripsy, who was recently admitted to [facility] for CAP, just discharged one week ago, presented with 5/10 tightness-like chest discomfort, left precordial radiating to left shoulder, started at rest, was on continuously for about an hour & half then decreased after treatment at the ED but never resolved completely, not affected by deep breathing or change in body position, and not associated with dizziness, nausea, vomiting, palpitations, SOB, or diaphoresis. As per son at the bedside, she was not feeling well since she wake up this am, so he cheched her blood pressure several time, was in 170's/90's with HR in lower hundred's range, received Amlodipine 5 mg twice in 5 hours period, saw her PMD who increased her Toprol dose (didn't start at the new dose yet), then the chest discomfort started as soon as she came back home. No history of similar episodes in the past, had a normal stress test in 2016. This is a 57 y/o F with PMH of HTN, DM, Dyslipidemia Asthma, and Nephrolithiasis with Hydronephrosis s/p Lithotripsy, who was recently admitted to [facility] for CAP, just discharged one week ago, presented with 5/10 tightness-like chest discomfort, left precordial radiating to left shoulder, started at rest, was on continuously for about an hour & half then decreased after treatment at the ED but never resolved completely, not affected by deep breathing or change in body position, associated with palpitations, but no dizziness, nausea, vomiting, SOB, or diaphoresis. As per son at the bedside, she was not feeling well since she wake up this am, so he checked her blood pressure several time, was in 170's/90's with HR in lower hundred's range, received Amlodipine 5 mg twice in 5 hours period, saw her PMD who increased her Toprol dose (didn't start at the new dose yet), then the chest discomfort started as soon as she came back home. No history of similar episodes in the past, had a normal stress test in 2016.

## 2018-04-27 NOTE — ED PROVIDER NOTE - NS_ ATTENDINGSCRIBEDETAILS _ED_A_ED_FT
Griffin Whitlock MD - The scribe's documentation has been prepared under my direction and personally reviewed by me in its entirety. I confirm that the note above accurately reflects all work, treatment, procedures, and medical decision making performed by me.

## 2018-04-27 NOTE — ED ADULT NURSE REASSESSMENT NOTE - NS ED NURSE REASSESS COMMENT FT1
pt given 1st dose ntg with no change in chest heaviness and the md gave a second dose of ntg and pain unaffected but when asked about pain takes deep breath before answering but denies being affected by palpation deep breathing movement monitor nsr no ectopy

## 2018-04-27 NOTE — H&P ADULT - NSHPPHYSICALEXAM_GEN_ALL_CORE
-    Vital Signs Last 24 Hrs  T(C): 37.3 (27 Apr 2018 17:58), Max: 37.3 (27 Apr 2018 17:58)  T(F): 99.2 (27 Apr 2018 17:58), Max: 99.2 (27 Apr 2018 17:58)  HR: 87 (27 Apr 2018 22:53) (87 - 109)  BP: 119/85 (27 Apr 2018 22:53) (119/85 - 154/82)  BP(mean): --  RR: 22 (27 Apr 2018 22:53) (12 - 22)  SpO2: 99% (27 Apr 2018 22:53) (98% - 99%)  PHYSICAL EXAM:        GENERAL: NAD, well-groomed, well-developed.  HEAD:  Atraumatic, Normocephalic.  EYES: PERRLA, conjunctiva clear.  ENMT: no nasal discharge, no marah-pharyngeal erythema or exudates, MMM.   NECK: Supple, No JVD.  NERVOUS SYSTEM:  Alert & oriented X3, neurologically intact grossly.  CHEST/LUNG: good air entry B/L ,no rales, rhonchi, or wheezing.  HEART: Normal S1 & S2, no murmurs, or extra sounds.  ABDOMEN: Soft, obese, non tender, non distended; bowel sounds present, no palpable masses or organomegaly.  EXTREMITIES:  No clubbing, cyanosis, or edema.  VASCULAR: 2+ radial, DPA / PTA pulses B/L.  SKIN: No rashes or lesions.  PSYCH: normal affect & behavior.

## 2018-04-27 NOTE — H&P ADULT - PROBLEM SELECTOR PLAN 7
IMPROVE VTE Individual Risk Assessment          RISK                                                          Points  [  ] Previous VTE                                                3  [  ] Thrombophilia                                             2  [  ] Lower limb paralysis                                    2        (unable to hold up >15 seconds)    [  ] Current Cancer                                             2         (within 6 months)  [ x ] Immobilization > 24 hrs      (expected)          1  [  ] ICU/CCU stay > 24 hours                            1  [ x ] Age > 60                                                    1    IMPROVE VTE Score  2    ** Patient is at moderate risk for VTE, IMPROVE score of 2 in addition to the other risk factors not included in this scoring system, started her on UFH 5000 subcutaneous every 8 hours for DVT prophylaxis.

## 2018-04-27 NOTE — H&P ADULT - PROBLEM SELECTOR PLAN 6
Stable, will continue bronchodilators & inhaled steroids, in addition to Singulair and Deltasone 20 mg PO daily, discussed with them the need to get a bone density test,  patient & 2 sons at the bedside understand & agree, wrote the name of the test on the younger son's phone as per his request so he show it to her PMD next visit.

## 2018-04-27 NOTE — H&P ADULT - ASSESSMENT
57 y/o F with PMH of HTN, DM, Dyslipidemia Asthma, and Nephrolithiasis with Hydronephrosis s/p Lithotripsy presented with chest pain.

## 2018-04-27 NOTE — ED ADULT NURSE NOTE - CHPI ED SYMPTOMS NEG
no chills/no back pain/no cough/no fever/no diaphoresis/no dizziness/no nausea/no vomiting/no syncope

## 2018-04-27 NOTE — PATIENT PROFILE ADULT. - NS PRO ABUSE SCREEN SUSPICION NEGLECT YN
76y with significant pulmonary history presented with shrotness of breath   diagnosis suspected gram neg pneumonia no

## 2018-04-27 NOTE — H&P ADULT - NSHPLABSRESULTS_GEN_ALL_CORE
-                          13.9   12.9  )-----------( 361      ( 27 Apr 2018 18:33 )             41.2            04-27    137  |  99  |  8   ----------------------------<  171<H>  3.9   |  23  |  0.75    Ca    9.5      27 Apr 2018 18:33    TPro  7.8  /  Alb  3.8  /  TBili  0.3  /  DBili  x   /  AST  20  /  ALT  34  /  AlkPhos  84  04-27            cardiac enzymes:  .000 ng/mL / x     / 73 U/L / x     / 1.0 ng/mL          Culture Results:   No growth at 5 days. (04-17 @ 23:23)  Culture Results:   No growth at 5 days. (04-17 @ 21:25)            XR CHEST PORTABLE IMMED 1V      Cardiac monitor leads overlie the thorax.  Shallow inspiration, elevated diaphragm.  Blunting, left costophrenic angle. Right lung normally expanded. No   vascular congestion. No lobar consolidation. Heart size within normal   limits. Hilar regions, mediastinal contours, osseous structures intact.  Image reviewed by me.          EKG   As per my review shows ST at 105/min, normal [place] & QTc intervals, low QRS voltage, normal duration, and axis (Zero), with normal transition, no ST-T abnormality.          -

## 2018-04-27 NOTE — ED ADULT NURSE NOTE - OBJECTIVE STATEMENT
pt c/o chest heaviness was at [facility] earlier and had ekg and told rate in 100's and told to take extra metoprolol but still not feeling well

## 2018-04-28 ENCOUNTER — TRANSCRIPTION ENCOUNTER (OUTPATIENT)
Age: 58
End: 2018-04-28

## 2018-04-28 VITALS
HEART RATE: 74 BPM | RESPIRATION RATE: 19 BRPM | SYSTOLIC BLOOD PRESSURE: 108 MMHG | TEMPERATURE: 98 F | OXYGEN SATURATION: 97 % | DIASTOLIC BLOOD PRESSURE: 64 MMHG

## 2018-04-28 LAB
BASOPHILS # BLD AUTO: 0.2 K/UL — SIGNIFICANT CHANGE UP (ref 0–0.2)
BASOPHILS NFR BLD AUTO: 1.4 % — SIGNIFICANT CHANGE UP (ref 0–2)
CHOLEST SERPL-MCNC: 185 MG/DL — SIGNIFICANT CHANGE UP (ref 10–199)
EOSINOPHIL # BLD AUTO: 0.6 K/UL — HIGH (ref 0–0.5)
EOSINOPHIL NFR BLD AUTO: 5.5 % — SIGNIFICANT CHANGE UP (ref 0–6)
HCT VFR BLD CALC: 36.7 % — SIGNIFICANT CHANGE UP (ref 34.5–45)
HDLC SERPL-MCNC: 34 MG/DL — LOW (ref 40–125)
HGB BLD-MCNC: 12.6 G/DL — SIGNIFICANT CHANGE UP (ref 11.5–15.5)
LIPID PNL WITH DIRECT LDL SERPL: SIGNIFICANT CHANGE UP
LYMPHOCYTES # BLD AUTO: 39.5 % — SIGNIFICANT CHANGE UP (ref 13–44)
LYMPHOCYTES # BLD AUTO: 4.4 K/UL — HIGH (ref 1–3.3)
MCHC RBC-ENTMCNC: 30 PG — SIGNIFICANT CHANGE UP (ref 27–34)
MCHC RBC-ENTMCNC: 34.3 GM/DL — SIGNIFICANT CHANGE UP (ref 32–36)
MCV RBC AUTO: 87.3 FL — SIGNIFICANT CHANGE UP (ref 80–100)
MONOCYTES # BLD AUTO: 0.7 K/UL — SIGNIFICANT CHANGE UP (ref 0–0.9)
MONOCYTES NFR BLD AUTO: 6.2 % — SIGNIFICANT CHANGE UP (ref 2–14)
NEUTROPHILS # BLD AUTO: 5.3 K/UL — SIGNIFICANT CHANGE UP (ref 1.8–7.4)
NEUTROPHILS NFR BLD AUTO: 47.3 % — SIGNIFICANT CHANGE UP (ref 43–77)
PLATELET # BLD AUTO: 300 K/UL — SIGNIFICANT CHANGE UP (ref 150–400)
RBC # BLD: 4.2 M/UL — SIGNIFICANT CHANGE UP (ref 3.8–5.2)
RBC # FLD: 12.9 % — SIGNIFICANT CHANGE UP (ref 10.3–14.5)
TOTAL CHOLESTEROL/HDL RATIO MEASUREMENT: 5.4 RATIO — SIGNIFICANT CHANGE UP (ref 3.3–7.1)
TRIGL SERPL-MCNC: 708 MG/DL — HIGH (ref 10–149)
WBC # BLD: 11.2 K/UL — HIGH (ref 3.8–10.5)
WBC # FLD AUTO: 11.2 K/UL — HIGH (ref 3.8–10.5)

## 2018-04-28 PROCEDURE — 82553 CREATINE MB FRACTION: CPT

## 2018-04-28 PROCEDURE — 36415 COLL VENOUS BLD VENIPUNCTURE: CPT

## 2018-04-28 PROCEDURE — 80053 COMPREHEN METABOLIC PANEL: CPT

## 2018-04-28 PROCEDURE — 80061 LIPID PANEL: CPT

## 2018-04-28 PROCEDURE — 82962 GLUCOSE BLOOD TEST: CPT

## 2018-04-28 PROCEDURE — 85027 COMPLETE CBC AUTOMATED: CPT

## 2018-04-28 PROCEDURE — 84484 ASSAY OF TROPONIN QUANT: CPT

## 2018-04-28 PROCEDURE — 82550 ASSAY OF CK (CPK): CPT

## 2018-04-28 PROCEDURE — 71045 X-RAY EXAM CHEST 1 VIEW: CPT

## 2018-04-28 PROCEDURE — 99285 EMERGENCY DEPT VISIT HI MDM: CPT | Mod: 25

## 2018-04-28 PROCEDURE — G0378: CPT

## 2018-04-28 PROCEDURE — 99239 HOSP IP/OBS DSCHRG MGMT >30: CPT

## 2018-04-28 PROCEDURE — 93306 TTE W/DOPPLER COMPLETE: CPT

## 2018-04-28 PROCEDURE — 83880 ASSAY OF NATRIURETIC PEPTIDE: CPT

## 2018-04-28 PROCEDURE — 85379 FIBRIN DEGRADATION QUANT: CPT

## 2018-04-28 PROCEDURE — 94640 AIRWAY INHALATION TREATMENT: CPT

## 2018-04-28 RX ORDER — METOPROLOL TARTRATE 50 MG
1 TABLET ORAL
Qty: 30 | Refills: 0
Start: 2018-04-28

## 2018-04-28 RX ORDER — OMEPRAZOLE 10 MG/1
1 CAPSULE, DELAYED RELEASE ORAL
Qty: 14 | Refills: 0
Start: 2018-04-28 | End: 2018-05-11

## 2018-04-28 RX ORDER — METOPROLOL TARTRATE 50 MG
1 TABLET ORAL
Qty: 30 | Refills: 0 | OUTPATIENT
Start: 2018-04-28

## 2018-04-28 RX ADMIN — LISINOPRIL 10 MILLIGRAM(S): 2.5 TABLET ORAL at 05:56

## 2018-04-28 RX ADMIN — Medication 50 MILLIGRAM(S): at 05:55

## 2018-04-28 RX ADMIN — Medication 81 MILLIGRAM(S): at 11:54

## 2018-04-28 RX ADMIN — MONTELUKAST 10 MILLIGRAM(S): 4 TABLET, CHEWABLE ORAL at 11:54

## 2018-04-28 RX ADMIN — Medication 20 MILLIGRAM(S): at 05:56

## 2018-04-28 RX ADMIN — METFORMIN HYDROCHLORIDE 500 MILLIGRAM(S): 850 TABLET ORAL at 11:54

## 2018-04-28 RX ADMIN — HEPARIN SODIUM 5000 UNIT(S): 5000 INJECTION INTRAVENOUS; SUBCUTANEOUS at 05:55

## 2018-04-28 RX ADMIN — BUDESONIDE AND FORMOTEROL FUMARATE DIHYDRATE 2 PUFF(S): 160; 4.5 AEROSOL RESPIRATORY (INHALATION) at 07:02

## 2018-04-28 NOTE — DISCHARGE NOTE ADULT - HOSPITAL COURSE
This is a 57 y/o F with PMH of HTN, DM, Dyslipidemia Asthma, and Nephrolithiasis with Hydronephrosis s/p Lithotripsy, who was recently admitted to [facility] for CAP, just discharged one week ago, presented with 5/10 tightness-like chest discomfort, left precordial radiating to left shoulder, started at rest, was on continuously for about an hour & half then decreased after treatment at the ED but never resolved completely, not affected by deep breathing or change in body position, associated with palpitations, but no dizziness, nausea, vomiting, SOB, or diaphoresis. As per son at the bedside, she was not feeling well since she wake up this am, so he checked her blood pressure several time, was in 170's/90's with HR in lower hundred's range, received Amlodipine 5 mg twice in 5 hours period, saw her PMD who increased her Toprol dose (didn't start at the new dose yet), then the chest discomfort started as soon as she came back home. No history of similar episodes in the past, had a normal stress test in 2016.  Felt improved on the floor.  Enzymes negative.  No ischemic EKG changes.  Cleared by cardio for discharge.  Symptoms c/w GERD/anxiety.  Trial of PPI at bedtime for 2 weeks.  Counseled on anxiety mgmt.  See PCP within 3-5 days of discharge.

## 2018-04-28 NOTE — PROGRESS NOTE ADULT - PROBLEM SELECTOR PLAN 1
continue aspirin, beta blocker, ACE-.  cleared by cardiology for discharge.  troponin negative.  stress test 1 year ago negative.  no current chest pain or SOB.  Symptoms more c/w GERD or anxiety.

## 2018-04-28 NOTE — PROGRESS NOTE ADULT - ATTENDING COMMENTS
patient/ extensively counseled on anxiety/GERD mgmt.  ACS ruled out.  PE unlikely.  PCP f/up next week.

## 2018-04-28 NOTE — DISCHARGE NOTE ADULT - CARE PLAN
Principal Discharge DX:	Atypical chest pain  Goal:	prevent recurrence  Assessment and plan of treatment:	symptoms most c/w GERD or anxiety.  Trial of PPI at bed time for 2 weeks.  Counseled on anxiety mgmt.  Reassured.  Secondary Diagnosis:	Asthma  Assessment and plan of treatment:	inhalers.  AVOID steroids unless absolutely necessary.  Secondary Diagnosis:	DM (diabetes mellitus)  Assessment and plan of treatment:	Metformin  Secondary Diagnosis:	GERD (gastroesophageal reflux disease)  Assessment and plan of treatment:	Omeprazole at bedtime.

## 2018-04-28 NOTE — CONSULT NOTE ADULT - SUBJECTIVE AND OBJECTIVE BOX
History of Present Illness: The patient is a 58 year old female with a history of HTN, HL, DM, asthma who presents with chest pain. The patient is known to me from prior visit. The pain was left-sided, tightness, non-radiating, non-exertional, non-pleuritic. It began yesterday morning and persisted until the evening. There was some shortness of breath and palpitations. They went to see a covering primary yesterday who did an ECG showing sinus tachycardia but no other abnormalities. They went home; then the patient took her BP and noted it to be elevated to 170s. She subsequently went to ED. She was recently hospitalized here for viral and bacterial PNA. She had a nuclear stress test about 1.5 years ago that was normal.    Past Medical/Surgical History:  HTN, HL, DM, asthma     Medications:  Home Medications:  aspirin 81 mg oral tablet, chewable: 1 tab(s) orally once a day (17 Apr 2018 16:37)  metFORMIN 500 mg oral tablet, extended release: 1 tab(s) orally once a day (20 Apr 2018 11:55)      Family History: Non-contributory family history of premature cardiovascular atherosclerotic disease    Social History: No tobacco, alcohol or drug use    Review of Systems:  General: No fevers, chills, weight loss or gain  Skin: No rashes, color changes  Cardiovascular: (+) chest pain, orthopnea  Respiratory: No shortness of breath, cough  Gastrointestinal: No nausea, abdominal pain  Genitourinary: No incontinence, pain with urination  Musculoskeletal: No pain, swelling, decreased range of motion  Neurological: No headache, weakness  Psychiatric: No depression, anxiety  Endocrine: No weight loss or gain, increased thirst  All other systems are comprehensively negative.    Physical Exam:  Vitals:        Vital Signs Last 24 Hrs  T(C): 36.6 (28 Apr 2018 09:12), Max: 37.3 (27 Apr 2018 17:58)  T(F): 97.9 (28 Apr 2018 09:12), Max: 99.2 (27 Apr 2018 17:58)  HR: 74 (28 Apr 2018 09:12) (69 - 109)  BP: 108/64 (28 Apr 2018 09:12) (108/64 - 154/82)  BP(mean): --  RR: 19 (28 Apr 2018 09:12) (12 - 22)  SpO2: 97% (28 Apr 2018 09:12) (95% - 99%)  General: NAD  HEENT: MMM  Neck: No JVD, no carotid bruit  Lungs: CTAB  CV: RRR, nl S1/S2, no M/R/G  Abdomen: S/NT/ND, +BS  Extremities: No LE edema, no cyanosis  Neuro: AAOx3, non-focal  Skin: No rash    Labs:                        12.6   11.2  )-----------( 300      ( 28 Apr 2018 06:46 )             36.7     04-27    137  |  99  |  8   ----------------------------<  171<H>  3.9   |  23  |  0.75    Ca    9.5      27 Apr 2018 18:33    TPro  7.8  /  Alb  3.8  /  TBili  0.3  /  DBili  x   /  AST  20  /  ALT  34  /  AlkPhos  84  04-27    CARDIAC MARKERS ( 27 Apr 2018 23:00 )  .000 ng/mL / x     / x     / x     / x      CARDIAC MARKERS ( 27 Apr 2018 18:33 )  .000 ng/mL / x     / 73 U/L / x     / 1.0 ng/mL          ECG: Sinus tachycardia, LAD, low voltage

## 2018-04-28 NOTE — CONSULT NOTE ADULT - ASSESSMENT
The patient is a 58 year old female with a history of HTN, HL, DM, asthma who presents with chest pain.    Plan:  - Chest pain is atypical in nature  - ECG with no evidence of ischemia or infarction  - Acute MI ruled out with two sets of cardiac enzymes  - CXR with grossly clear lungs  - Echocardiogram with normal LV systolic function, no significant valve issues  - Patient can be discharged from a cardiac perspective and follow-up with me next week

## 2018-04-28 NOTE — PROGRESS NOTE ADULT - SUBJECTIVE AND OBJECTIVE BOX
Patient is a 58y old  Female who presents with a chief complaint of Chest pain. (27 Apr 2018 22:20)      INTERVAL History of Present Illness/OVERNIGHT EVENTS: feels better.  no current chest pain or SOB.  Symptoms more c/w GERD or anxiety.    MEDICATIONS  (STANDING):  aspirin  chewable 81 milliGRAM(s) Oral daily  buDESOnide  80 MICROgram(s)/formoterol 4.5 MICROgram(s) Inhaler 2 Puff(s) Inhalation two times a day  dextrose 5%. 1000 milliLiter(s) (50 mL/Hr) IV Continuous <Continuous>  dextrose 50% Injectable 12.5 Gram(s) IV Push once  dextrose 50% Injectable 25 Gram(s) IV Push once  dextrose 50% Injectable 25 Gram(s) IV Push once  heparin  Injectable 5000 Unit(s) SubCutaneous every 8 hours  insulin lispro (HumaLOG) corrective regimen sliding scale   SubCutaneous three times a day before meals  insulin lispro (HumaLOG) corrective regimen sliding scale   SubCutaneous at bedtime  lisinopril 10 milliGRAM(s) Oral daily  metFORMIN  milliGRAM(s) Oral daily  metoprolol succinate ER 50 milliGRAM(s) Oral daily  montelukast 10 milliGRAM(s) Oral daily  predniSONE   Tablet 20 milliGRAM(s) Oral daily    MEDICATIONS  (PRN):  ALBUTerol    90 MICROgram(s) HFA Inhaler 2 Puff(s) Inhalation every 6 hours PRN Shortness of Breath and/or Wheezing  dextrose Gel 1 Dose(s) Oral once PRN Blood Glucose LESS THAN 70 milliGRAM(s)/deciliter  glucagon  Injectable 1 milliGRAM(s) IntraMuscular once PRN Glucose LESS THAN 70 milligrams/deciliter  nitroglycerin     SubLingual 0.4 milliGRAM(s) SubLingual every 5 minutes PRN Chest Pain      Allergies    Cipro (Unknown)    Intolerances        REVIEW OF SYSTEMS:  Negative unless otherwise specified above.    Vital Signs Last 24 Hrs  T(C): 36.6 (28 Apr 2018 09:12), Max: 37.3 (27 Apr 2018 17:58)  T(F): 97.9 (28 Apr 2018 09:12), Max: 99.2 (27 Apr 2018 17:58)  HR: 74 (28 Apr 2018 09:12) (69 - 109)  BP: 108/64 (28 Apr 2018 09:12) (108/64 - 154/82)  BP(mean): --  RR: 19 (28 Apr 2018 09:12) (12 - 22)  SpO2: 97% (28 Apr 2018 09:12) (95% - 99%)    Height (cm): 162.56 (04-27 @ 17:58)  Weight (kg): 74.4 (04-27 @ 17:58)  BMI (kg/m2): 28.2 (04-27 @ 17:58)  BSA (m2): 1.8 (04-27 @ 17:58)    PHYSICAL EXAM:  GENERAL: No apparent distress  HEAD:  Atraumatic, Normocephalic  EYES: conjunctiva and sclera clear  ENMT: Moist mucous membranes  NECK: Supple  CHEST/LUNG: Clear to auscultation bilaterally  HEART: Regular rate and rhythm  ABDOMEN: Soft, Nontender, Nondistended; Bowel sounds present  EXTREMITIES:  No clubbing, cyanosis or edema  SKIN: No rashes or lesions  NERVOUS SYSTEM:  Alert & Oriented X3; Bilateral Lower extremity mobile, sensation to light touch intact      LABS:                        12.6   11.2  )-----------( 300      ( 28 Apr 2018 06:46 )             36.7     27 Apr 2018 18:33    137    |  99     |  8      ----------------------------<  171    3.9     |  23     |  0.75     Ca    9.5        27 Apr 2018 18:33    TPro  7.8    /  Alb  3.8    /  TBili  0.3    /  DBili  x      /  AST  20     /  ALT  34     /  AlkPhos  84     27 Apr 2018 18:33        CAPILLARY BLOOD GLUCOSE      POCT Blood Glucose.: 149 mg/dL (28 Apr 2018 07:50)  POCT Blood Glucose.: 131 mg/dL (27 Apr 2018 23:31)      RADIOLOGY & ADDITIONAL TESTS:    Images reviewed personally CXR clear    Consultant Notes Reviewed and Care Discussed with relevant Consultants/Other Providers.

## 2018-04-28 NOTE — DISCHARGE NOTE ADULT - MEDICATION SUMMARY - MEDICATIONS TO TAKE
I will START or STAY ON the medications listed below when I get home from the hospital:    aspirin 81 mg oral tablet, chewable  -- 1 tab(s) by mouth once a day  -- Indication: For stroke/MI prevention    lisinopril 10 mg oral tablet  -- 1 tab(s) by mouth once a day   -- Do not take this drug if you are pregnant.  It is very important that you take or use this exactly as directed.  Do not skip doses or discontinue unless directed by your doctor.  Some non-prescription drugs may aggravate your condition.  Read all labels carefully.  If a warning appears, check with your doctor before taking.    -- Indication: For HTN    metFORMIN 500 mg oral tablet, extended release  -- 1 tab(s) by mouth once a day  -- Indication: For Diabetes    metoprolol succinate 25 mg oral tablet, extended release  -- 1 tab(s) by mouth once a day   -- It is very important that you take or use this exactly as directed.  Do not skip doses or discontinue unless directed by your doctor.  May cause drowsiness.  Alcohol may intensify this effect.  Use care when operating dangerous machinery.  Some non-prescription drugs may aggravate your condition.  Read all labels carefully.  If a warning appears, check with your doctor before taking.  Swallow whole.  Do not crush.  Take with food or milk.  This drug may impair the ability to drive or operate machinery.  Use care until you become familiar with its effects.    -- Indication: For HTN    Ventolin HFA 90 mcg/inh inhalation aerosol  -- 2 puff(s) inhaled 4 times a day, As Needed -for shortness of breath and/or wheezing   -- Indication: For Asthma    Symbicort 80 mcg-4.5 mcg/inh inhalation aerosol  -- 2 puff(s) inhaled 2 times a day   -- Check with your doctor before becoming pregnant.  For inhalation only.  Rinse mouth thoroughly after use.    -- Indication: For Asthma    Singulair 10 mg oral tablet  -- 1 tab(s) by mouth once a day  -- Indication: For Asthma    omeprazole 40 mg oral delayed release capsule  -- 1 cap(s) by mouth once a day (at bedtime)   -- It is very important that you take or use this exactly as directed.  Do not skip doses or discontinue unless directed by your doctor.  Obtain medical advice before taking any non-prescription drugs as some may affect the action of this medication.  Swallow whole.  Do not crush.    -- Indication: For GERD

## 2018-04-28 NOTE — DISCHARGE NOTE ADULT - PATIENT PORTAL LINK FT
You can access the [facility] Patient Portal, offered by [facility], by registering with the following website: [email or web address]

## 2018-04-28 NOTE — DISCHARGE NOTE ADULT - MEDICATION SUMMARY - MEDICATIONS TO STOP TAKING
I will STOP taking the medications listed below when I get home from the hospital:    Deltasone 20 mg oral tablet  -- 1 tab(s) by mouth once a day   -- It is very important that you take or use this exactly as directed.  Do not skip doses or discontinue unless directed by your doctor.  Obtain medical advice before taking any non-prescription drugs as some may affect the action of this medication.  Take with food or milk.    amoxicillin-clavulanate 875 mg-125 mg oral tablet  -- 1 tab(s) by mouth every 12 hours   -- Finish all this medication unless otherwise directed by prescriber.  Take with food or milk.

## 2018-04-28 NOTE — PROGRESS NOTE ADULT - PROBLEM SELECTOR PLAN 4
Controlled, on Metformin 500 mg PO daily, will continue, in addition to Lispro insuline coverage with meals & at bedtime on a sliding scale, last HbA1c was 6.7, only 10 days ago as per hospital records.

## 2018-04-28 NOTE — DISCHARGE NOTE ADULT - PLAN OF CARE
prevent recurrence symptoms most c/w GERD or anxiety.  Trial of PPI at bed time for 2 weeks.  Counseled on anxiety mgmt.  Reassured. inhalers.  AVOID steroids unless absolutely necessary. Metformin Omeprazole at bedtime.

## 2018-11-20 ENCOUNTER — FORM ENCOUNTER (OUTPATIENT)
Age: 58
End: 2018-11-20

## 2018-11-21 ENCOUNTER — APPOINTMENT (OUTPATIENT)
Dept: CT IMAGING | Facility: IMAGING CENTER | Age: 58
End: 2018-11-21
Payer: COMMERCIAL

## 2018-11-21 ENCOUNTER — OTHER (OUTPATIENT)
Age: 58
End: 2018-11-21

## 2018-11-21 ENCOUNTER — APPOINTMENT (OUTPATIENT)
Dept: UROLOGY | Facility: CLINIC | Age: 58
End: 2018-11-21
Payer: COMMERCIAL

## 2018-11-21 ENCOUNTER — OUTPATIENT (OUTPATIENT)
Dept: OUTPATIENT SERVICES | Facility: HOSPITAL | Age: 58
LOS: 1 days | End: 2018-11-21
Payer: COMMERCIAL

## 2018-11-21 DIAGNOSIS — Z98.89 OTHER SPECIFIED POSTPROCEDURAL STATES: Chronic | ICD-10-CM

## 2018-11-21 DIAGNOSIS — R35.0 FREQUENCY OF MICTURITION: ICD-10-CM

## 2018-11-21 DIAGNOSIS — R82.991 HYPOCITRATURIA: ICD-10-CM

## 2018-11-21 DIAGNOSIS — N13.30 UNSPECIFIED HYDRONEPHROSIS: ICD-10-CM

## 2018-11-21 DIAGNOSIS — Z98.51 TUBAL LIGATION STATUS: Chronic | ICD-10-CM

## 2018-11-21 DIAGNOSIS — N20.0 CALCULUS OF KIDNEY: ICD-10-CM

## 2018-11-21 PROCEDURE — 74176 CT ABD & PELVIS W/O CONTRAST: CPT

## 2018-11-21 PROCEDURE — 76775 US EXAM ABDO BACK WALL LIM: CPT

## 2018-11-21 PROCEDURE — 99213 OFFICE O/P EST LOW 20 MIN: CPT | Mod: 25

## 2018-11-21 PROCEDURE — 76775 US EXAM ABDO BACK WALL LIM: CPT | Mod: 26

## 2018-11-21 PROCEDURE — 74176 CT ABD & PELVIS W/O CONTRAST: CPT | Mod: 26

## 2018-11-29 DIAGNOSIS — N13.30 UNSPECIFIED HYDRONEPHROSIS: ICD-10-CM

## 2018-11-29 DIAGNOSIS — N20.0 CALCULUS OF KIDNEY: ICD-10-CM

## 2018-11-29 DIAGNOSIS — R82.991 HYPOCITRATURIA: ICD-10-CM

## 2018-12-19 ENCOUNTER — RESULT REVIEW (OUTPATIENT)
Age: 58
End: 2018-12-19

## 2019-05-16 NOTE — ED PROVIDER NOTE - ENMT, MLM
Airway patent, Nasal mucosa clear. Mouth with normal mucosa. Throat has no vesicles, no oropharyngeal exudates and uvula is midline. No

## 2019-09-06 NOTE — ED PROVIDER NOTE - CPE EDP SKIN NORM
Medical Necessity Information: It is in your best interest to select a reason for this procedure from the list below. All of these items fulfill various CMS LCD requirements except the new and changing color options. Anesthesia Volume In Cc: 0.3 Hemostasis: Drysol Consent was obtained from the patient. The risks and benefits to therapy were discussed in detail. Specifically, the risks of infection, scarring, bleeding, prolonged wound healing, incomplete removal, allergy to anesthesia, nerve injury and recurrence were addressed. Prior to the procedure, the treatment site was clearly identified and confirmed by the patient. All components of Universal Protocol/PAUSE Rule completed. Render Post-Care Instructions In Note?: no Size Of Lesion In Cm (Required): 0.6 Wound Care: Vaseline X Size Of Lesion In Cm (Optional): 0 Path Notes (To The Dermatopathologist): Please check margins. normal... Biopsy Method: Dermablade Post-Care Instructions: I reviewed with the patient in detail post-care instructions. Patient is to keep the biopsy site dry overnight, and then apply bacitracin twice daily until healed. Patient may apply hydrogen peroxide soaks to remove any crusting. Was A Bandage Applied: Yes Detail Level: Detailed Anesthesia Type: 1% lidocaine with epinephrine Notification Instructions: Patient will be notified of biopsy results. However, patient instructed to call the office if not contacted within 2 weeks. Billing Type: Third-Party Bill Medical Necessity Clause: This procedure was medically necessary because the lesion that was treated was: enlarging, changing color, clinically suspicious

## 2019-12-06 ENCOUNTER — EMERGENCY (EMERGENCY)
Facility: HOSPITAL | Age: 59
LOS: 1 days | Discharge: HOME | End: 2019-12-06
Attending: EMERGENCY MEDICINE | Admitting: EMERGENCY MEDICINE
Payer: COMMERCIAL

## 2019-12-06 VITALS
HEART RATE: 85 BPM | RESPIRATION RATE: 15 BRPM | SYSTOLIC BLOOD PRESSURE: 110 MMHG | OXYGEN SATURATION: 96 % | TEMPERATURE: 98 F | DIASTOLIC BLOOD PRESSURE: 56 MMHG

## 2019-12-06 VITALS
TEMPERATURE: 98 F | WEIGHT: 156.09 LBS | OXYGEN SATURATION: 99 % | SYSTOLIC BLOOD PRESSURE: 160 MMHG | HEART RATE: 78 BPM | DIASTOLIC BLOOD PRESSURE: 90 MMHG | RESPIRATION RATE: 14 BRPM

## 2019-12-06 DIAGNOSIS — Z98.51 TUBAL LIGATION STATUS: Chronic | ICD-10-CM

## 2019-12-06 DIAGNOSIS — Z98.89 OTHER SPECIFIED POSTPROCEDURAL STATES: Chronic | ICD-10-CM

## 2019-12-06 LAB
ALBUMIN SERPL ELPH-MCNC: 4.2 G/DL — SIGNIFICANT CHANGE UP (ref 3.3–5)
ALP SERPL-CCNC: 79 U/L — SIGNIFICANT CHANGE UP (ref 40–120)
ALT FLD-CCNC: 27 U/L — SIGNIFICANT CHANGE UP (ref 12–78)
ANION GAP SERPL CALC-SCNC: 8 MMOL/L — SIGNIFICANT CHANGE UP (ref 5–17)
AST SERPL-CCNC: 34 U/L — SIGNIFICANT CHANGE UP (ref 15–37)
BILIRUB SERPL-MCNC: 0.4 MG/DL — SIGNIFICANT CHANGE UP (ref 0.2–1.2)
BUN SERPL-MCNC: 9 MG/DL — SIGNIFICANT CHANGE UP (ref 7–23)
CALCIUM SERPL-MCNC: 9.4 MG/DL — SIGNIFICANT CHANGE UP (ref 8.5–10.1)
CHLORIDE SERPL-SCNC: 107 MMOL/L — SIGNIFICANT CHANGE UP (ref 96–108)
CK MB CFR SERPL CALC: 3.8 NG/ML — HIGH (ref 0–3.6)
CO2 SERPL-SCNC: 26 MMOL/L — SIGNIFICANT CHANGE UP (ref 22–31)
CREAT SERPL-MCNC: 0.7 MG/DL — SIGNIFICANT CHANGE UP (ref 0.5–1.3)
D DIMER BLD IA.RAPID-MCNC: <150 NG/ML DDU — SIGNIFICANT CHANGE UP
GLUCOSE SERPL-MCNC: 139 MG/DL — HIGH (ref 70–99)
HCT VFR BLD CALC: 40.5 % — SIGNIFICANT CHANGE UP (ref 34.5–45)
HGB BLD-MCNC: 13.7 G/DL — SIGNIFICANT CHANGE UP (ref 11.5–15.5)
MCHC RBC-ENTMCNC: 29.1 PG — SIGNIFICANT CHANGE UP (ref 27–34)
MCHC RBC-ENTMCNC: 33.8 GM/DL — SIGNIFICANT CHANGE UP (ref 32–36)
MCV RBC AUTO: 86.2 FL — SIGNIFICANT CHANGE UP (ref 80–100)
NRBC # BLD: 0 /100 WBCS — SIGNIFICANT CHANGE UP (ref 0–0)
PLATELET # BLD AUTO: 297 K/UL — SIGNIFICANT CHANGE UP (ref 150–400)
POTASSIUM SERPL-MCNC: 3.9 MMOL/L — SIGNIFICANT CHANGE UP (ref 3.5–5.3)
POTASSIUM SERPL-SCNC: 3.9 MMOL/L — SIGNIFICANT CHANGE UP (ref 3.5–5.3)
PROT SERPL-MCNC: 8 G/DL — SIGNIFICANT CHANGE UP (ref 6–8.3)
RBC # BLD: 4.7 M/UL — SIGNIFICANT CHANGE UP (ref 3.8–5.2)
RBC # FLD: 13.1 % — SIGNIFICANT CHANGE UP (ref 10.3–14.5)
SODIUM SERPL-SCNC: 141 MMOL/L — SIGNIFICANT CHANGE UP (ref 135–145)
TROPONIN I SERPL-MCNC: <.015 NG/ML — SIGNIFICANT CHANGE UP (ref 0.01–0.04)
TROPONIN I SERPL-MCNC: <.015 NG/ML — SIGNIFICANT CHANGE UP (ref 0.01–0.04)
WBC # BLD: 9.16 K/UL — SIGNIFICANT CHANGE UP (ref 3.8–10.5)
WBC # FLD AUTO: 9.16 K/UL — SIGNIFICANT CHANGE UP (ref 3.8–10.5)

## 2019-12-06 PROCEDURE — 85027 COMPLETE CBC AUTOMATED: CPT

## 2019-12-06 PROCEDURE — 82553 CREATINE MB FRACTION: CPT

## 2019-12-06 PROCEDURE — 71046 X-RAY EXAM CHEST 2 VIEWS: CPT | Mod: 26

## 2019-12-06 PROCEDURE — 93010 ELECTROCARDIOGRAM REPORT: CPT

## 2019-12-06 PROCEDURE — 96374 THER/PROPH/DIAG INJ IV PUSH: CPT

## 2019-12-06 PROCEDURE — 71046 X-RAY EXAM CHEST 2 VIEWS: CPT

## 2019-12-06 PROCEDURE — 94640 AIRWAY INHALATION TREATMENT: CPT

## 2019-12-06 PROCEDURE — 84484 ASSAY OF TROPONIN QUANT: CPT

## 2019-12-06 PROCEDURE — 80053 COMPREHEN METABOLIC PANEL: CPT

## 2019-12-06 PROCEDURE — 99284 EMERGENCY DEPT VISIT MOD MDM: CPT

## 2019-12-06 PROCEDURE — 93005 ELECTROCARDIOGRAM TRACING: CPT

## 2019-12-06 PROCEDURE — 85379 FIBRIN DEGRADATION QUANT: CPT

## 2019-12-06 PROCEDURE — 99284 EMERGENCY DEPT VISIT MOD MDM: CPT | Mod: 25

## 2019-12-06 PROCEDURE — 36415 COLL VENOUS BLD VENIPUNCTURE: CPT

## 2019-12-06 RX ORDER — KETOROLAC TROMETHAMINE 30 MG/ML
30 SYRINGE (ML) INJECTION ONCE
Refills: 0 | Status: DISCONTINUED | OUTPATIENT
Start: 2019-12-06 | End: 2019-12-06

## 2019-12-06 RX ORDER — LIDOCAINE 4 G/100G
1 CREAM TOPICAL
Qty: 30 | Refills: 0
Start: 2019-12-06 | End: 2020-01-04

## 2019-12-06 RX ORDER — IPRATROPIUM/ALBUTEROL SULFATE 18-103MCG
3 AEROSOL WITH ADAPTER (GRAM) INHALATION ONCE
Refills: 0 | Status: COMPLETED | OUTPATIENT
Start: 2019-12-06 | End: 2019-12-06

## 2019-12-06 RX ADMIN — Medication 30 MILLIGRAM(S): at 12:22

## 2019-12-06 RX ADMIN — Medication 3 MILLILITER(S): at 12:20

## 2019-12-06 NOTE — ED ADULT NURSE NOTE - OBJECTIVE STATEMENT
patient comes to ED for evaluation of left upper back and left chest pain for two days pt speake little english family at bedside pts family states pt has also had cough for 5 days denies fever chills

## 2019-12-06 NOTE — ED PROVIDER NOTE - OBJECTIVE STATEMENT
60 y/o F with hx of DM with c/o cough x 3 days, left upper back pain and now left sided cp x 2 days.  Pt denies fevers, chills, SOB.

## 2019-12-06 NOTE — ED PROVIDER NOTE - PATIENT PORTAL LINK FT
You can access the FollowMyHealth Patient Portal offered by [facility] by registering at the following website: [email or web address]. By joining [employer]’s FollowMyHealth portal, you will also be able to view your health information using other applications (apps) compatible with our system.

## 2019-12-30 ENCOUNTER — EMERGENCY (EMERGENCY)
Facility: HOSPITAL | Age: 59
LOS: 1 days | Discharge: HOME | End: 2019-12-30
Attending: EMERGENCY MEDICINE | Admitting: EMERGENCY MEDICINE
Payer: COMMERCIAL

## 2019-12-30 VITALS
DIASTOLIC BLOOD PRESSURE: 82 MMHG | SYSTOLIC BLOOD PRESSURE: 155 MMHG | HEIGHT: 64 IN | HEART RATE: 88 BPM | RESPIRATION RATE: 16 BRPM | OXYGEN SATURATION: 96 % | WEIGHT: 171.96 LBS | TEMPERATURE: 99 F

## 2019-12-30 DIAGNOSIS — Z98.89 OTHER SPECIFIED POSTPROCEDURAL STATES: Chronic | ICD-10-CM

## 2019-12-30 DIAGNOSIS — Z98.51 TUBAL LIGATION STATUS: Chronic | ICD-10-CM

## 2019-12-30 LAB
ALBUMIN SERPL ELPH-MCNC: 4.3 G/DL — SIGNIFICANT CHANGE UP (ref 3.3–5)
ALP SERPL-CCNC: 74 U/L — SIGNIFICANT CHANGE UP (ref 30–120)
ALT FLD-CCNC: 33 U/L DA — SIGNIFICANT CHANGE UP (ref 10–60)
ANION GAP SERPL CALC-SCNC: 7 MMOL/L — SIGNIFICANT CHANGE UP (ref 5–17)
APPEARANCE UR: CLEAR — SIGNIFICANT CHANGE UP
AST SERPL-CCNC: 27 U/L — SIGNIFICANT CHANGE UP (ref 10–40)
BACTERIA # UR AUTO: ABNORMAL
BASOPHILS # BLD AUTO: 0.06 K/UL — SIGNIFICANT CHANGE UP (ref 0–0.2)
BASOPHILS NFR BLD AUTO: 0.5 % — SIGNIFICANT CHANGE UP (ref 0–2)
BILIRUB SERPL-MCNC: 0.4 MG/DL — SIGNIFICANT CHANGE UP (ref 0.2–1.2)
BILIRUB UR-MCNC: NEGATIVE — SIGNIFICANT CHANGE UP
BUN SERPL-MCNC: 12 MG/DL — SIGNIFICANT CHANGE UP (ref 7–23)
CALCIUM SERPL-MCNC: 9.5 MG/DL — SIGNIFICANT CHANGE UP (ref 8.4–10.5)
CHLORIDE SERPL-SCNC: 101 MMOL/L — SIGNIFICANT CHANGE UP (ref 96–108)
CO2 SERPL-SCNC: 28 MMOL/L — SIGNIFICANT CHANGE UP (ref 22–31)
COLOR SPEC: YELLOW — SIGNIFICANT CHANGE UP
CREAT SERPL-MCNC: 0.74 MG/DL — SIGNIFICANT CHANGE UP (ref 0.5–1.3)
DIFF PNL FLD: NEGATIVE — SIGNIFICANT CHANGE UP
EOSINOPHIL # BLD AUTO: 1.52 K/UL — HIGH (ref 0–0.5)
EOSINOPHIL NFR BLD AUTO: 12.9 % — HIGH (ref 0–6)
EPI CELLS # UR: SIGNIFICANT CHANGE UP
GLUCOSE SERPL-MCNC: 118 MG/DL — HIGH (ref 70–99)
GLUCOSE UR QL: NEGATIVE MG/DL — SIGNIFICANT CHANGE UP
HCT VFR BLD CALC: 42.8 % — SIGNIFICANT CHANGE UP (ref 34.5–45)
HGB BLD-MCNC: 14 G/DL — SIGNIFICANT CHANGE UP (ref 11.5–15.5)
IMM GRANULOCYTES NFR BLD AUTO: 0.3 % — SIGNIFICANT CHANGE UP (ref 0–1.5)
KETONES UR-MCNC: NEGATIVE — SIGNIFICANT CHANGE UP
LEUKOCYTE ESTERASE UR-ACNC: ABNORMAL
LYMPHOCYTES # BLD AUTO: 18.9 % — SIGNIFICANT CHANGE UP (ref 13–44)
LYMPHOCYTES # BLD AUTO: 2.23 K/UL — SIGNIFICANT CHANGE UP (ref 1–3.3)
MCHC RBC-ENTMCNC: 28.8 PG — SIGNIFICANT CHANGE UP (ref 27–34)
MCHC RBC-ENTMCNC: 32.7 GM/DL — SIGNIFICANT CHANGE UP (ref 32–36)
MCV RBC AUTO: 88.1 FL — SIGNIFICANT CHANGE UP (ref 80–100)
MONOCYTES # BLD AUTO: 0.83 K/UL — SIGNIFICANT CHANGE UP (ref 0–0.9)
MONOCYTES NFR BLD AUTO: 7 % — SIGNIFICANT CHANGE UP (ref 2–14)
NEUTROPHILS # BLD AUTO: 7.15 K/UL — SIGNIFICANT CHANGE UP (ref 1.8–7.4)
NEUTROPHILS NFR BLD AUTO: 60.4 % — SIGNIFICANT CHANGE UP (ref 43–77)
NITRITE UR-MCNC: NEGATIVE — SIGNIFICANT CHANGE UP
NRBC # BLD: 0 /100 WBCS — SIGNIFICANT CHANGE UP (ref 0–0)
PH UR: 7 — SIGNIFICANT CHANGE UP (ref 5–8)
PLATELET # BLD AUTO: 312 K/UL — SIGNIFICANT CHANGE UP (ref 150–400)
POTASSIUM SERPL-MCNC: 3.9 MMOL/L — SIGNIFICANT CHANGE UP (ref 3.5–5.3)
POTASSIUM SERPL-SCNC: 3.9 MMOL/L — SIGNIFICANT CHANGE UP (ref 3.5–5.3)
PROT SERPL-MCNC: 8.5 G/DL — HIGH (ref 6–8.3)
PROT UR-MCNC: NEGATIVE MG/DL — SIGNIFICANT CHANGE UP
RBC # BLD: 4.86 M/UL — SIGNIFICANT CHANGE UP (ref 3.8–5.2)
RBC # FLD: 13.2 % — SIGNIFICANT CHANGE UP (ref 10.3–14.5)
SODIUM SERPL-SCNC: 136 MMOL/L — SIGNIFICANT CHANGE UP (ref 135–145)
SP GR SPEC: 1 — LOW (ref 1.01–1.02)
UROBILINOGEN FLD QL: NEGATIVE MG/DL — SIGNIFICANT CHANGE UP
WBC # BLD: 11.82 K/UL — HIGH (ref 3.8–10.5)
WBC # FLD AUTO: 11.82 K/UL — HIGH (ref 3.8–10.5)
WBC UR QL: SIGNIFICANT CHANGE UP

## 2019-12-30 PROCEDURE — 85027 COMPLETE CBC AUTOMATED: CPT

## 2019-12-30 PROCEDURE — 96361 HYDRATE IV INFUSION ADD-ON: CPT

## 2019-12-30 PROCEDURE — 99285 EMERGENCY DEPT VISIT HI MDM: CPT

## 2019-12-30 PROCEDURE — 80053 COMPREHEN METABOLIC PANEL: CPT

## 2019-12-30 PROCEDURE — 74176 CT ABD & PELVIS W/O CONTRAST: CPT | Mod: 26

## 2019-12-30 PROCEDURE — 74176 CT ABD & PELVIS W/O CONTRAST: CPT

## 2019-12-30 PROCEDURE — 99284 EMERGENCY DEPT VISIT MOD MDM: CPT | Mod: 25

## 2019-12-30 PROCEDURE — 87186 SC STD MICRODIL/AGAR DIL: CPT

## 2019-12-30 PROCEDURE — 96374 THER/PROPH/DIAG INJ IV PUSH: CPT

## 2019-12-30 PROCEDURE — 87086 URINE CULTURE/COLONY COUNT: CPT

## 2019-12-30 PROCEDURE — 36415 COLL VENOUS BLD VENIPUNCTURE: CPT

## 2019-12-30 PROCEDURE — 96375 TX/PRO/DX INJ NEW DRUG ADDON: CPT

## 2019-12-30 PROCEDURE — 81001 URINALYSIS AUTO W/SCOPE: CPT

## 2019-12-30 RX ORDER — ONDANSETRON 8 MG/1
4 TABLET, FILM COATED ORAL ONCE
Refills: 0 | Status: COMPLETED | OUTPATIENT
Start: 2019-12-30 | End: 2019-12-30

## 2019-12-30 RX ORDER — CEFUROXIME AXETIL 250 MG
1 TABLET ORAL
Qty: 14 | Refills: 0
Start: 2019-12-30 | End: 2020-01-05

## 2019-12-30 RX ORDER — KETOROLAC TROMETHAMINE 30 MG/ML
30 SYRINGE (ML) INJECTION ONCE
Refills: 0 | Status: DISCONTINUED | OUTPATIENT
Start: 2019-12-30 | End: 2019-12-30

## 2019-12-30 RX ORDER — CEFUROXIME AXETIL 250 MG
500 TABLET ORAL ONCE
Refills: 0 | Status: COMPLETED | OUTPATIENT
Start: 2019-12-30 | End: 2019-12-30

## 2019-12-30 RX ORDER — SODIUM CHLORIDE 9 MG/ML
1000 INJECTION INTRAMUSCULAR; INTRAVENOUS; SUBCUTANEOUS ONCE
Refills: 0 | Status: COMPLETED | OUTPATIENT
Start: 2019-12-30 | End: 2019-12-30

## 2019-12-30 RX ADMIN — SODIUM CHLORIDE 1000 MILLILITER(S): 9 INJECTION INTRAMUSCULAR; INTRAVENOUS; SUBCUTANEOUS at 11:38

## 2019-12-30 RX ADMIN — SODIUM CHLORIDE 1000 MILLILITER(S): 9 INJECTION INTRAMUSCULAR; INTRAVENOUS; SUBCUTANEOUS at 12:47

## 2019-12-30 RX ADMIN — Medication 30 MILLIGRAM(S): at 11:38

## 2019-12-30 RX ADMIN — ONDANSETRON 4 MILLIGRAM(S): 8 TABLET, FILM COATED ORAL at 11:38

## 2019-12-30 RX ADMIN — Medication 500 MILLIGRAM(S): at 12:55

## 2019-12-30 RX ADMIN — Medication 30 MILLIGRAM(S): at 12:00

## 2019-12-30 NOTE — ED PROVIDER NOTE - NSFOLLOWUPINSTRUCTIONS_ED_ALL_ED_FT
1 Abd discomfort recommended Magnesium citrate 100ml and consider Gas-x. Drink plenty of water take antibiotics follow up with urologist return to er for any worsening symptoms.  recommended Magnesium citrate 100ml for constipation as needed.

## 2019-12-30 NOTE — ED PROVIDER NOTE - OBJECTIVE STATEMENT
Pt is a 60 yo female with pmhx of Asthma Calculus of kidney DM GERD Herpes simplex HLD Hydronephrosis Hypertension hypothyroidism c/o of intermittent left flank pain for 3-4 days with nausea chills + dysuria denies any vomiting fever hematuria chest pain sob. Pt states has hx of right side kidney stones with stent. Pt states feels like previous kidney stone. Pt denies taking anything for pain.     urologist hoenig

## 2019-12-30 NOTE — ED PROVIDER NOTE - CARE PROVIDER_API CALL
Hoenig, David M (MD)  Urology  [facility] Dept of Urology, [address] [place] [address]  Phone: [phone]  Fax: [phone]  Follow Up Time:

## 2019-12-30 NOTE — ED PROVIDER NOTE - PROGRESS NOTE DETAILS
Rachel ARROYO for Dr. Ramirez: 60 y/o female with a PMHx of DM, GERD, HLD, Hydronephrosis, HTN, Hypothyroidism, presents to the ED c/o gradual onset intermittent non radiating left flank pain x 3-4 days. Notes pain is similar to past kidney stone but with R flank pain and had stent placement 3 years ago. No pain relievers at home. Allergic to Cipro. labs wnl u/a will treat since symptomatic no left side kidney stone + right side kidney stone feeling better advised Ceftin fluids f/u with urologist and advised otc stool softener Rachel ARROYO for Dr. Ramirez: 58 y/o female with a PMHx of DM, GERD, HLD, Hydronephrosis, HTN, Hypothyroidism, presents to the ED c/o gradual onset intermittent non radiating left flank pain x 3-4 days. Notes pain is similar to past kidney stone but with R flank pain and had stent placement 3 years ago. No pain relievers at home. Allergic to Cipro.  Appears well, nontoxic.

## 2019-12-30 NOTE — ED PROVIDER NOTE - PATIENT PORTAL LINK FT
You can access the FollowMyHealth Patient Portal offered by [facility] by registering at the following website: [email or web address]. By joining [employer]’s FollowMyHealth portal, you will also be able to view your health information using other applications (apps) compatible with our system.

## 2019-12-30 NOTE — ED PROVIDER NOTE - CARE PLAN
Principal Discharge DX:	Flank pain Principal Discharge DX:	Flank pain  Secondary Diagnosis:	Constipation

## 2019-12-30 NOTE — ED ADULT NURSE NOTE - OBJECTIVE STATEMENT
58 y/o female presents aox3 ambulatory accompanied by spouse c/o Left sided flank pain for the past 4 days associated with nausea earlier today but denies vomiting. pt has hx of renal stones and states the pain is similar to her last incident.

## 2019-12-30 NOTE — ED PROVIDER NOTE - NS_EDPROVIDERDISPOUSERTYPE_ED_A_ED
I have personally evaluated and examined the patient. The Attending was available to me as a supervising provider if needed. Scribe Attestation (For Scribes USE Only).../I have personally evaluated and examined the patient. The Attending was available to me as a supervising provider if needed. Attending Attestation (For Attendings USE Only).../Scribe Attestation (For Scribes USE Only).../I have personally evaluated and examined the patient. The Attending was available to me as a supervising provider if needed.

## 2019-12-30 NOTE — ED ADULT NURSE NOTE - DOES PATIENT HAVE ADVANCE DIRECTIVE
Excellent! Please call or sent a Rexly message if you have any questions. Alondra aTtum M.D.        No

## 2020-07-17 NOTE — PROVIDER CONTACT NOTE (CRITICAL VALUE NOTIFICATION) - SITUATION
repeat lactate 5.4 Doxycycline Counseling:  Patient counseled regarding possible photosensitivity and increased risk for sunburn.  Patient instructed to avoid sunlight, if possible.  When exposed to sunlight, patients should wear protective clothing, sunglasses, and sunscreen.  The patient was instructed to call the office immediately if the following severe adverse effects occur:  hearing changes, easy bruising/bleeding, severe headache, or vision changes.  The patient verbalized understanding of the proper use and possible adverse effects of doxycycline.  All of the patient's questions and concerns were addressed.

## 2020-11-02 NOTE — ED ADULT TRIAGE NOTE - WEIGHT IN KG
"Initial /81   Pulse 105   Ht 1.626 m (5' 4\")   BMI 27.12 kg/m   Estimated body mass index is 27.12 kg/m  as calculated from the following:    Height as of this encounter: 1.626 m (5' 4\").    Weight as of 10/28/20: 71.7 kg (158 lb). .      " 70.8

## 2020-12-11 NOTE — H&P ADULT - ASSESSMENT
Patient called stating that he is still having the abdominal pain in the U[facility].  I discussed with Chaya SINGH and she put in a referral for patient to go to Gastro.  Patient was willing and was given the phone number to be able to call at his convenience.      Referral completed   57 y/o F with PMH of HTN, DM, Dyslipidemia Asthma, Nephrolithiasis with Hydronephrosis s/p Lithotripsy, and Hypothyroidism presented with SOB, was found to have PNA. 57 y/o F with PMH of HTN, DM, Dyslipidemia Asthma, and Nephrolithiasis with Hydronephrosis s/p Lithotripsy presented with SOB, was found to have PNA.

## 2021-04-26 NOTE — ED ADULT NURSE NOTE - OBJECTIVE STATEMENT
PROVIDER:[TOKEN:[[id number]]]
58 year old female presents to the ED with c/o chest pain x 30 mins. A+O x 4.  and family at the bedside. States she ate dinner at 1700. CP started while she was sitting in the chair. Pt reports sharp left pectoral chest pain. Reports headache. Denies dizziness, back pain, abdominal pain, NVD. Abdomen soft non tender and non distended, but round and obese. No edema noted bilaterally. Pt reports taking BP her regular BP meds and 1 baby Aspirin before arrival to the ED. Tachycardic and hypertensive. skin warm dry and intact. Will continue to monitor.

## 2021-06-03 NOTE — ED PROVIDER NOTE - MEDICAL DECISION MAKING DETAILS
Christopher Perkins  [email or web address]   [address] [address], [place] [address] ·  [phone]    Call to schedule with Dr. Perkins for your right hand  Please get x ray of right foot in 2 weeks (before taking boot off)      
pt with palpitations, mild dizziness, no cp, check ce x 2, neg, ? infilt on cxr and dehydration, levoquin, improved with ivf, d/c

## 2021-07-21 ENCOUNTER — TRANSCRIPTION ENCOUNTER (OUTPATIENT)
Age: 61
End: 2021-07-21

## 2021-07-28 NOTE — PROGRESS NOTE ADULT - PROBLEM SELECTOR PROBLEM 4
DM2 (diabetes mellitus, type 2)
DM2 (diabetes mellitus, type 2)
Discharged
DM2 (diabetes mellitus, type 2)

## 2021-10-07 ENCOUNTER — APPOINTMENT (OUTPATIENT)
Dept: UROLOGY | Facility: CLINIC | Age: 61
End: 2021-10-07

## 2021-10-14 ENCOUNTER — APPOINTMENT (OUTPATIENT)
Dept: UROLOGY | Facility: CLINIC | Age: 61
End: 2021-10-14
Payer: MEDICAID

## 2021-10-14 VITALS
HEART RATE: 68 BPM | WEIGHT: 166 LBS | HEIGHT: 60 IN | BODY MASS INDEX: 32.59 KG/M2 | OXYGEN SATURATION: 97 % | SYSTOLIC BLOOD PRESSURE: 108 MMHG | DIASTOLIC BLOOD PRESSURE: 70 MMHG

## 2021-10-14 DIAGNOSIS — R35.1 NOCTURIA: ICD-10-CM

## 2021-10-14 PROCEDURE — 99214 OFFICE O/P EST MOD 30 MIN: CPT

## 2021-10-14 NOTE — LETTER BODY
[Dear  ___] : Dear  [unfilled], [Courtesy Letter:] : I had the pleasure of seeing your patient, [unfilled], in my office today. [Please see my note below.] : Please see my note below. [Sincerely,] : Sincerely, [FreeTextEntry3] : Uriel Magallanes MD\par  of Urology\par [facility] [employer]\par \par Offices:\par [facility] @\par [address], [place] [address]\par and\par [address], [place] [address], [address]\par and\par [address] [address]\par \par TEL: [phone]\par FAX: [phone]

## 2021-10-14 NOTE — ASSESSMENT
[FreeTextEntry1] : Reviewed outside records on [facility]. \par Repeat urine again positive. Not sure if it was clean catch. \par Had MRI for Pancreatic cyst, also shows right kidney stone. \par \par Recent urinary tract infection:\par Recommended: good oral hydration, timed voiding, urinate right after sex, change sanitary pads often, avoid douches, bubble baths and other feminine hygiene products. \par Will get Urinalysis and Urine culture. \par \par Kidney stone:\par Discussed the management options for non obstructing kidney stones- watch and wait Vs Ureteroscopy Vs Shock wave lithotripsy- if radio-opaque or ultrasound amenable. \par Risks and benefits of each modality were discussed. \par Will get CT scan. \par Recommended Kidney stone prevention diet:\par Good oral hydration so that urine is clear to light yellow, usually 1.5 to 2 Liters of fluids, mainly water\par Increasing Citrate in diet by consuming citrus fruits and juices- sugar, limes, oranges, grapefruits and berries \par Less red meat\par Less salt\par Limit foods with oxalate like- dark green vegetables, rhubarb, chocolate, wheat bran, nuts, cranberries, and beans\par \par Nocturia:\par Asked Pt to limit PM fluid intake. \par \par Return to office after CT scan. \par

## 2021-10-14 NOTE — HISTORY OF PRESENT ILLNESS
[FreeTextEntry1] : 61 year old female presents for recent urinary tract infection. \par Recently developed urinary tract infection: had dysuria and frequency. \par Treated with Cefuroxime. No better. \par Daytime frequency is 4-5 x or so. Nocturia of 4-5 x. \par Denies dysuria, hematuria, lower abdominal or flank pain, fever, chills or rigors.\par \par On recent Ultrasound was found to have kidney stone. \par Has history of kidney stone, status post right percutaneous nephrolithotomy and endopyelotomy by Dr Hoenig in 2016. \par \par

## 2021-10-15 LAB
APPEARANCE: ABNORMAL
BACTERIA: ABNORMAL
BILIRUBIN URINE: NEGATIVE
BLOOD URINE: NEGATIVE
COLOR: YELLOW
GLUCOSE QUALITATIVE U: NEGATIVE
HYALINE CASTS: 0 /LPF
KETONES URINE: NEGATIVE
LEUKOCYTE ESTERASE URINE: ABNORMAL
MICROSCOPIC-UA: NORMAL
NITRITE URINE: NEGATIVE
PH URINE: 7.5
PROTEIN URINE: NORMAL
RED BLOOD CELLS URINE: 1 /HPF
SPECIFIC GRAVITY URINE: 1.01
SQUAMOUS EPITHELIAL CELLS: 0 /HPF
UROBILINOGEN URINE: NORMAL
WHITE BLOOD CELLS URINE: 132 /HPF

## 2021-10-18 ENCOUNTER — RESULT REVIEW (OUTPATIENT)
Age: 61
End: 2021-10-18

## 2021-10-19 LAB — BACTERIA UR CULT: ABNORMAL

## 2021-10-20 DIAGNOSIS — N39.0 URINARY TRACT INFECTION, SITE NOT SPECIFIED: ICD-10-CM

## 2021-10-21 ENCOUNTER — OUTPATIENT (OUTPATIENT)
Dept: OUTPATIENT SERVICES | Facility: HOSPITAL | Age: 61
LOS: 1 days | End: 2021-10-21
Payer: COMMERCIAL

## 2021-10-21 ENCOUNTER — APPOINTMENT (OUTPATIENT)
Dept: CT IMAGING | Facility: CLINIC | Age: 61
End: 2021-10-21
Payer: MEDICAID

## 2021-10-21 DIAGNOSIS — N20.0 CALCULUS OF KIDNEY: ICD-10-CM

## 2021-10-21 DIAGNOSIS — Z98.89 OTHER SPECIFIED POSTPROCEDURAL STATES: Chronic | ICD-10-CM

## 2021-10-21 DIAGNOSIS — Z98.51 TUBAL LIGATION STATUS: Chronic | ICD-10-CM

## 2021-10-21 PROCEDURE — 74176 CT ABD & PELVIS W/O CONTRAST: CPT

## 2021-10-21 PROCEDURE — 74176 CT ABD & PELVIS W/O CONTRAST: CPT | Mod: 26

## 2021-11-02 LAB
APPEARANCE: CLEAR
BACTERIA: NEGATIVE
BILIRUBIN URINE: NEGATIVE
BLOOD URINE: NEGATIVE
COLOR: COLORLESS
GLUCOSE QUALITATIVE U: NEGATIVE
HYALINE CASTS: 0 /LPF
KETONES URINE: NEGATIVE
LEUKOCYTE ESTERASE URINE: ABNORMAL
MICROSCOPIC-UA: NORMAL
NITRITE URINE: NEGATIVE
PH URINE: 7
PROTEIN URINE: NEGATIVE
RED BLOOD CELLS URINE: 0 /HPF
SPECIFIC GRAVITY URINE: 1
SQUAMOUS EPITHELIAL CELLS: 1 /HPF
UROBILINOGEN URINE: NORMAL
WHITE BLOOD CELLS URINE: 0 /HPF

## 2021-11-04 LAB — BACTERIA UR CULT: ABNORMAL

## 2021-11-09 ENCOUNTER — APPOINTMENT (OUTPATIENT)
Dept: UROLOGY | Facility: CLINIC | Age: 61
End: 2021-11-09
Payer: MEDICAID

## 2021-11-09 VITALS — TEMPERATURE: 97.8 F

## 2021-11-09 PROCEDURE — 99214 OFFICE O/P EST MOD 30 MIN: CPT

## 2021-11-10 LAB
APPEARANCE: CLEAR
BACTERIA: ABNORMAL
BILIRUBIN URINE: NEGATIVE
BLOOD URINE: NEGATIVE
COLOR: NORMAL
GLUCOSE QUALITATIVE U: NEGATIVE
HYALINE CASTS: 0 /LPF
KETONES URINE: NEGATIVE
LEUKOCYTE ESTERASE URINE: ABNORMAL
MICROSCOPIC-UA: NORMAL
NITRITE URINE: POSITIVE
PH URINE: 6.5
PROTEIN URINE: NORMAL
RED BLOOD CELLS URINE: 1 /HPF
SPECIFIC GRAVITY URINE: 1.01
SQUAMOUS EPITHELIAL CELLS: 9 /HPF
URINE COMMENTS: NORMAL
UROBILINOGEN URINE: NORMAL
WHITE BLOOD CELLS URINE: 17 /HPF

## 2021-11-15 NOTE — LETTER BODY
[Dear  ___] : Dear  [unfilled], [Courtesy Letter:] : I had the pleasure of seeing your patient, [unfilled], in my office today. [Please see my note below.] : Please see my note below. [Sincerely,] : Sincerely, [FreeTextEntry3] : Uriel Magallanes MD\par  of Urology\par [facility]\par \par Offices:\par [facility] @\par [address], [place] [address]\par and\par [address], [place] [address], [address]\par and\par [address] [address]\par \par TEL: [phone]\par FAX: [phone]

## 2021-11-15 NOTE — ASSESSMENT
[FreeTextEntry1] : Recurrent urinary tract infections:\par Recommended: good oral hydration, timed voiding, urinate right after sex, change sanitary pads often, avoid douches, bubble baths and other feminine hygiene products. \par Discussed that if she keeps having UTIs options would be to either use low dose vaginal estrogen cream or low prophylactic antibiotics.\par Recommended catheterized specimen. Will try clean catch urine specimen one more time. \par  \par Kidney stone:\par Discussed the management options for non obstructing kidney stones- watch and wait Vs Ureteroscopy Vs Shock wave lithotripsy- if radio-opaque or ultrasound amenable. \par Risks and benefits of each modality were discussed. \par Patient will consider options. \par  \par

## 2021-11-15 NOTE — HISTORY OF PRESENT ILLNESS
[FreeTextEntry1] : 61 year old female presents for follow up \par Limiting PM fluid intake, nocturia 2-3 x. \par Denies dysuria, hematuria, lower abdominal or flank pain, nausea, vomiting, fever, chills or rigors. \par \par Recently seen for urinary tract infection. \par Had developed dysuria and frequency. \par Treated with Cefuroxime, did not feel better. \par Daytime frequency is 4-5 x or so. Nocturia of 4-5 x. \par Denies dysuria, hematuria, lower abdominal or flank pain, fever, chills or rigors.\par \par On recent Ultrasound was found to have kidney stone. \par Has history of kidney stone, status post right percutaneous nephrolithotomy and endopyelotomy by Dr Hoenig in 2016. \par \par

## 2021-11-16 LAB — BACTERIA UR CULT: ABNORMAL

## 2022-01-14 ENCOUNTER — APPOINTMENT (OUTPATIENT)
Dept: UROLOGY | Facility: CLINIC | Age: 62
End: 2022-01-14
Payer: MEDICAID

## 2022-01-14 VITALS
OXYGEN SATURATION: 96 % | WEIGHT: 163 LBS | SYSTOLIC BLOOD PRESSURE: 121 MMHG | DIASTOLIC BLOOD PRESSURE: 67 MMHG | HEART RATE: 73 BPM | BODY MASS INDEX: 30 KG/M2 | HEIGHT: 62 IN

## 2022-01-14 PROCEDURE — 51701 INSERT BLADDER CATHETER: CPT

## 2022-01-14 PROCEDURE — 99214 OFFICE O/P EST MOD 30 MIN: CPT | Mod: 25

## 2022-01-15 LAB
APPEARANCE: ABNORMAL
BACTERIA: ABNORMAL
BILIRUBIN URINE: NEGATIVE
BLOOD URINE: NEGATIVE
COLOR: YELLOW
GLUCOSE QUALITATIVE U: NEGATIVE
HYALINE CASTS: 2 /LPF
KETONES URINE: NEGATIVE
LEUKOCYTE ESTERASE URINE: ABNORMAL
MICROSCOPIC-UA: NORMAL
NITRITE URINE: POSITIVE
PH URINE: 7
PROTEIN URINE: NORMAL
RED BLOOD CELLS URINE: 1 /HPF
SPECIFIC GRAVITY URINE: 1.01
SQUAMOUS EPITHELIAL CELLS: 0 /HPF
UROBILINOGEN URINE: NORMAL
WHITE BLOOD CELLS URINE: 38 /HPF

## 2022-01-17 NOTE — PHYSICAL EXAM
[Normal Appearance] : normal appearance [General Appearance - In No Acute Distress] : no acute distress [Urethral Meatus] : the meatus of the urethra showed no abnormalities [External Female Genitalia] : normal external genitalia [Atrophy] : atrophy [Skin Color & Pigmentation] : normal skin color and pigmentation [FreeTextEntry1] : normal peripheral circulation  [] : no respiratory distress [Oriented To Time, Place, And Person] : oriented to person, place, and time

## 2022-01-17 NOTE — ASSESSMENT
[FreeTextEntry1] : Kidney stone:\par Right 2 non obstructing kidney stones: 3 and 10 mm. \par Discussed the management options for non obstructing kidney stones- watch and wait Vs Ureteroscopy Vs Shock wave lithotripsy- if radio-opaque or ultrasound amenable. \par Risks and benefits of each modality were discussed. \par Discussed kidney stone as possible source of bacteria. \par Patient will consider options. \par  \par Recurrent urinary tract infections:\par Atrophic vaginitis:\par Obtained catheterized specimen: Will get Urinalysis and Urine culture. \par Will inform results. \par Will consider low dose Estrogen cream.\par

## 2022-01-17 NOTE — LETTER BODY
[Dear  ___] : Dear  [unfilled], [Courtesy Letter:] : I had the pleasure of seeing your patient, [unfilled], in my office today. [Please see my note below.] : Please see my note below. [Sincerely,] : Sincerely, [FreeTextEntry3] : Uriel Magallanes MD\par  of Urology\par [facility]\par \par Offices:\par [facility] @\par [address], [place] [address]\par and\par [address], [place] [address], [address]\par and\par [address] [address]\par \par TEL: [phone]\par FAX: [phone]

## 2022-01-17 NOTE — HISTORY OF PRESENT ILLNESS
[FreeTextEntry1] : 61 year old female presents for follow up. \par Has off and on abdominal discomfort, last few days has passed less urine. \par Limiting PM fluid intake, nocturia 2-3 x. \par Denies dysuria, hematuria, lower abdominal or flank pain, nausea, vomiting, fever, chills or rigors. \par \par Recently seen for urinary tract infection. On CT scan(10/21/21): right 2 non obstructing kidney stones: 3 and 10 mm. \par Had developed dysuria and frequency. \par Treated with Cefuroxime, did not feel better. \par Daytime frequency is 4-5 x or so. Nocturia of 4-5 x. \par Denies dysuria, hematuria, lower abdominal or flank pain, fever, chills or rigors.\par \par On recent Ultrasound was found to have kidney stone. \par Has history of kidney stone, status post right percutaneous nephrolithotomy and endopyelotomy by Dr Hoenig in 2016. \par \par

## 2022-01-18 DIAGNOSIS — N95.2 POSTMENOPAUSAL ATROPHIC VAGINITIS: ICD-10-CM

## 2022-01-18 LAB — BACTERIA UR CULT: ABNORMAL

## 2022-05-12 ENCOUNTER — APPOINTMENT (OUTPATIENT)
Dept: INTERNAL MEDICINE | Facility: CLINIC | Age: 62
End: 2022-05-12

## 2022-06-24 ENCOUNTER — NON-APPOINTMENT (OUTPATIENT)
Age: 62
End: 2022-06-24

## 2022-06-24 ENCOUNTER — APPOINTMENT (OUTPATIENT)
Dept: INTERNAL MEDICINE | Facility: CLINIC | Age: 62
End: 2022-06-24
Payer: MEDICAID

## 2022-06-24 VITALS
BODY MASS INDEX: 30.36 KG/M2 | RESPIRATION RATE: 14 BRPM | SYSTOLIC BLOOD PRESSURE: 118 MMHG | TEMPERATURE: 97.3 F | OXYGEN SATURATION: 97 % | DIASTOLIC BLOOD PRESSURE: 70 MMHG | HEART RATE: 67 BPM | HEIGHT: 62 IN | WEIGHT: 165 LBS

## 2022-06-24 PROCEDURE — 93000 ELECTROCARDIOGRAM COMPLETE: CPT

## 2022-06-24 PROCEDURE — 99386 PREV VISIT NEW AGE 40-64: CPT | Mod: 25

## 2022-06-24 RX ORDER — OXYCODONE AND ACETAMINOPHEN 5; 325 MG/1; MG/1
5-325 TABLET ORAL
Qty: 30 | Refills: 0 | Status: DISCONTINUED | COMMUNITY
Start: 2018-11-23 | End: 2022-06-24

## 2022-06-24 RX ORDER — TAMSULOSIN HYDROCHLORIDE 0.4 MG/1
0.4 CAPSULE ORAL
Qty: 30 | Refills: 0 | Status: DISCONTINUED | COMMUNITY
Start: 2018-11-23 | End: 2022-06-24

## 2022-06-24 RX ORDER — HYDROCORTISONE 25 MG/G
2.5 CREAM TOPICAL
Qty: 28 | Refills: 0 | Status: DISCONTINUED | COMMUNITY
Start: 2022-05-25

## 2022-06-24 RX ORDER — ALBUTEROL SULFATE 90 UG/1
108 (90 BASE) INHALANT RESPIRATORY (INHALATION)
Qty: 7 | Refills: 0 | Status: DISCONTINUED | COMMUNITY
Start: 2022-04-25

## 2022-06-24 RX ORDER — ERGOCALCIFEROL 1.25 MG/1
1.25 MG CAPSULE, LIQUID FILLED ORAL
Qty: 4 | Refills: 0 | Status: DISCONTINUED | COMMUNITY
Start: 2022-05-17

## 2022-06-24 NOTE — HISTORY OF PRESENT ILLNESS
[de-identified] : 62 y.o. F with PMHx of T2DM, HTN, HLD, and hypothyroidism presents as new pt to establish care. Pt with chronic left upper chest pain that pt has been extensively worked up for and has been hospitalized for. Ultimately diagnosed as a MSK strain. \par \par

## 2022-06-24 NOTE — PLAN
[FreeTextEntry1] : HCM:\par -check labs \par -EKG sinus jessica \par -UTD with mammo \par -recommend f/u with GYN for pap smear \par -order cologuard \par \par T2DM: check a1c and microalbumin, continue with metformin\par HTN: Stable, continue amlodipine and metoprolol \par HLD: check lipids, continue with atorvastatin \par Hypothyroidism: check TFTs, continue with levothyroxine

## 2022-06-27 LAB
25(OH)D3 SERPL-MCNC: 29.3 NG/ML
ALBUMIN SERPL ELPH-MCNC: 4.9 G/DL
ALP BLD-CCNC: 78 U/L
ALT SERPL-CCNC: 22 U/L
ANION GAP SERPL CALC-SCNC: 11 MMOL/L
APPEARANCE: CLEAR
AST SERPL-CCNC: 21 U/L
BACTERIA: NEGATIVE
BASOPHILS # BLD AUTO: 0.03 K/UL
BASOPHILS NFR BLD AUTO: 0.4 %
BILIRUB SERPL-MCNC: 0.4 MG/DL
BILIRUBIN URINE: NEGATIVE
BLOOD URINE: NEGATIVE
BUN SERPL-MCNC: 9 MG/DL
CALCIUM SERPL-MCNC: 9.8 MG/DL
CHLORIDE SERPL-SCNC: 101 MMOL/L
CHOLEST SERPL-MCNC: 185 MG/DL
CO2 SERPL-SCNC: 25 MMOL/L
COLOR: COLORLESS
CREAT SERPL-MCNC: 0.62 MG/DL
CREAT SPEC-SCNC: 36 MG/DL
EGFR: 99 ML/MIN/1.73M2
EOSINOPHIL # BLD AUTO: 0.49 K/UL
EOSINOPHIL NFR BLD AUTO: 6 %
ESTIMATED AVERAGE GLUCOSE: 143 MG/DL
FOLATE SERPL-MCNC: >20 NG/ML
GLUCOSE QUALITATIVE U: NEGATIVE
GLUCOSE SERPL-MCNC: 111 MG/DL
HBA1C MFR BLD HPLC: 6.6 %
HCT VFR BLD CALC: 39.8 %
HDLC SERPL-MCNC: 40 MG/DL
HGB BLD-MCNC: 12.7 G/DL
HYALINE CASTS: 0 /LPF
IMM GRANULOCYTES NFR BLD AUTO: 0.2 %
KETONES URINE: NEGATIVE
LDLC SERPL CALC-MCNC: 94 MG/DL
LEUKOCYTE ESTERASE URINE: ABNORMAL
LYMPHOCYTES # BLD AUTO: 2.28 K/UL
LYMPHOCYTES NFR BLD AUTO: 27.8 %
MAN DIFF?: NORMAL
MCHC RBC-ENTMCNC: 28.6 PG
MCHC RBC-ENTMCNC: 31.9 GM/DL
MCV RBC AUTO: 89.6 FL
MICROALBUMIN 24H UR DL<=1MG/L-MCNC: 1.9 MG/DL
MICROALBUMIN/CREAT 24H UR-RTO: 51 MG/G
MICROSCOPIC-UA: NORMAL
MONOCYTES # BLD AUTO: 0.59 K/UL
MONOCYTES NFR BLD AUTO: 7.2 %
NEUTROPHILS # BLD AUTO: 4.8 K/UL
NEUTROPHILS NFR BLD AUTO: 58.4 %
NITRITE URINE: POSITIVE
NONHDLC SERPL-MCNC: 145 MG/DL
PH URINE: 7.5
PLATELET # BLD AUTO: 321 K/UL
POTASSIUM SERPL-SCNC: 4.3 MMOL/L
PROT SERPL-MCNC: 7.2 G/DL
PROTEIN URINE: NEGATIVE
RBC # BLD: 4.44 M/UL
RBC # FLD: 13.5 %
RED BLOOD CELLS URINE: 1 /HPF
SODIUM SERPL-SCNC: 137 MMOL/L
SPECIFIC GRAVITY URINE: 1.01
SQUAMOUS EPITHELIAL CELLS: 1 /HPF
TRIGL SERPL-MCNC: 252 MG/DL
TSH SERPL-ACNC: 2.16 UIU/ML
UROBILINOGEN URINE: NORMAL
VIT B12 SERPL-MCNC: 347 PG/ML
WBC # FLD AUTO: 8.21 K/UL
WHITE BLOOD CELLS URINE: 8 /HPF

## 2022-06-28 LAB — BACTERIA UR CULT: ABNORMAL

## 2022-06-28 RX ORDER — ATORVASTATIN CALCIUM 20 MG/1
20 TABLET, FILM COATED ORAL
Qty: 30 | Refills: 0 | Status: DISCONTINUED | COMMUNITY
Start: 2022-06-21 | End: 2022-06-28

## 2022-07-08 NOTE — ED ADULT NURSE NOTE - NS ED NURSE IV DC DT
Received call from Juliet, patient's daughter.    She is requesting an order for urine sample from possible UTI.  There are no Primary Care openings for the rest of the day, Friday.  Advised that she needs to be seen for her symptoms as noted in Nurse Triage below.  Juliet was instructed by RN below to give Basaglar to patient as she missed last nights dose.    She will take patient to the [facility] in the Hospital in case she needs to be seen in ER.    Mirna Melendrez RN, [facility]     29-Sep-2017 22:50

## 2022-07-19 ENCOUNTER — NON-APPOINTMENT (OUTPATIENT)
Age: 62
End: 2022-07-19

## 2022-07-25 LAB — BACTERIA UR CULT: ABNORMAL

## 2022-08-05 ENCOUNTER — APPOINTMENT (OUTPATIENT)
Dept: INTERNAL MEDICINE | Facility: CLINIC | Age: 62
End: 2022-08-05

## 2022-08-05 VITALS
WEIGHT: 166 LBS | HEART RATE: 78 BPM | HEIGHT: 61 IN | DIASTOLIC BLOOD PRESSURE: 60 MMHG | BODY MASS INDEX: 31.34 KG/M2 | SYSTOLIC BLOOD PRESSURE: 108 MMHG | OXYGEN SATURATION: 97 % | RESPIRATION RATE: 14 BRPM | TEMPERATURE: 97.2 F

## 2022-08-05 DIAGNOSIS — R80.9 PROTEINURIA, UNSPECIFIED: ICD-10-CM

## 2022-08-05 PROCEDURE — 99214 OFFICE O/P EST MOD 30 MIN: CPT

## 2022-08-05 RX ORDER — HYDROXYZINE PAMOATE 25 MG/1
25 CAPSULE ORAL
Qty: 30 | Refills: 0 | Status: DISCONTINUED | COMMUNITY
Start: 2022-04-18

## 2022-08-05 RX ORDER — SULFAMETHOXAZOLE AND TRIMETHOPRIM 800; 160 MG/1; MG/1
800-160 TABLET ORAL TWICE DAILY
Qty: 6 | Refills: 0 | Status: DISCONTINUED | COMMUNITY
Start: 2022-07-19 | End: 2022-08-05

## 2022-08-05 RX ORDER — PANTOPRAZOLE 20 MG/1
20 TABLET, DELAYED RELEASE ORAL
Qty: 30 | Refills: 0 | Status: DISCONTINUED | COMMUNITY
Start: 2022-03-18

## 2022-08-05 RX ORDER — FAMOTIDINE 20 MG/1
20 TABLET, FILM COATED ORAL
Qty: 14 | Refills: 0 | Status: DISCONTINUED | COMMUNITY
Start: 2022-04-05
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
## 2022-08-05 NOTE — PHYSICAL EXAM
[No Acute Distress] : no acute distress [Well-Appearing] : well-appearing [Normal Voice/Communication] : normal voice/communication [No Respiratory Distress] : no respiratory distress  [No Accessory Muscle Use] : no accessory muscle use [Clear to Auscultation] : lungs were clear to auscultation bilaterally [Normal Rate] : normal rate  [Regular Rhythm] : with a regular rhythm [No Murmur] : no murmur heard [No Focal Deficits] : no focal deficits [Alert and Oriented x3] : oriented to person, place, and time [de-identified] : TTP medial knee joint line and medial inferior aspect of patella, ROM limited 2/2 pain

## 2022-08-05 NOTE — PLAN
[FreeTextEntry1] : Right knee pain: naproxen BID with food, recommend PT, f/u MRI \par T2DM: discussed microalbuminuria, will replace amlodipine with lisinopril, pt to check BP next 2 weeks and call if elevated on new regimen

## 2022-08-29 DIAGNOSIS — M25.561 PAIN IN RIGHT KNEE: ICD-10-CM

## 2022-09-15 ENCOUNTER — APPOINTMENT (OUTPATIENT)
Dept: INTERNAL MEDICINE | Facility: CLINIC | Age: 62
End: 2022-09-15

## 2022-09-16 DIAGNOSIS — N39.0 URINARY TRACT INFECTION, SITE NOT SPECIFIED: ICD-10-CM

## 2022-09-16 LAB
APPEARANCE: ABNORMAL
BACTERIA: ABNORMAL
BILIRUBIN URINE: NEGATIVE
BLOOD URINE: NEGATIVE
COLOR: NORMAL
GLUCOSE QUALITATIVE U: NEGATIVE
HYALINE CASTS: 0 /LPF
KETONES URINE: NEGATIVE
LEUKOCYTE ESTERASE URINE: ABNORMAL
MICROSCOPIC-UA: NORMAL
NITRITE URINE: POSITIVE
PH URINE: 7
PROTEIN URINE: NEGATIVE
RED BLOOD CELLS URINE: 1 /HPF
SPECIFIC GRAVITY URINE: 1.01
SQUAMOUS EPITHELIAL CELLS: 0 /HPF
UROBILINOGEN URINE: NORMAL
WHITE BLOOD CELLS URINE: 0 /HPF

## 2022-09-17 LAB — BACTERIA UR CULT: ABNORMAL

## 2022-09-22 ENCOUNTER — RX RENEWAL (OUTPATIENT)
Age: 62
End: 2022-09-22

## 2022-10-25 ENCOUNTER — OUTPATIENT (OUTPATIENT)
Dept: OUTPATIENT SERVICES | Facility: HOSPITAL | Age: 62
LOS: 1 days | End: 2022-10-25
Payer: MEDICAID

## 2022-10-25 ENCOUNTER — APPOINTMENT (OUTPATIENT)
Dept: RADIOLOGY | Facility: CLINIC | Age: 62
End: 2022-10-25

## 2022-10-25 ENCOUNTER — APPOINTMENT (OUTPATIENT)
Dept: INTERNAL MEDICINE | Facility: CLINIC | Age: 62
End: 2022-10-25

## 2022-10-25 VITALS
HEIGHT: 61 IN | SYSTOLIC BLOOD PRESSURE: 120 MMHG | WEIGHT: 163 LBS | BODY MASS INDEX: 30.78 KG/M2 | DIASTOLIC BLOOD PRESSURE: 70 MMHG | OXYGEN SATURATION: 96 % | HEART RATE: 96 BPM | RESPIRATION RATE: 14 BRPM

## 2022-10-25 DIAGNOSIS — J20.9 ACUTE BRONCHITIS, UNSPECIFIED: ICD-10-CM

## 2022-10-25 DIAGNOSIS — Z98.89 OTHER SPECIFIED POSTPROCEDURAL STATES: Chronic | ICD-10-CM

## 2022-10-25 DIAGNOSIS — Z98.51 TUBAL LIGATION STATUS: Chronic | ICD-10-CM

## 2022-10-25 PROCEDURE — 71046 X-RAY EXAM CHEST 2 VIEWS: CPT

## 2022-10-25 PROCEDURE — 71046 X-RAY EXAM CHEST 2 VIEWS: CPT | Mod: 26

## 2022-10-25 PROCEDURE — 99214 OFFICE O/P EST MOD 30 MIN: CPT

## 2022-10-25 NOTE — HISTORY OF PRESENT ILLNESS
[Mild] : mild [___ Days ago] : [unfilled] days ago [Constant] : constant [Congestion] : congestion [Cough] : cough [Shortness Of Breath] : shortness of breath [Stable] : stable [Sore Throat] : no sore throat [Wheezing] : no wheezing [Chills] : no chills [Anorexia] : no anorexia [Earache] : no earache [Fatigue] : not fatigue [Headache] : no headache [Fever] : no fever [FreeTextEntry8] : EMELINA DENIS is a 62 year old F who presents today for an acute visit for coughing and chest congestion for 4 days. Pt also reports being constipated for 2 days\par has HTN DM

## 2022-10-25 NOTE — END OF VISIT
[FreeTextEntry3] : "I, Susan Akers, personally scribed the services dictated to me by Dr. Lucian Mcnair MD in this documentation on 10/25/2022 " \par \par "I Dr. Lucian Mcnair MD, personally performed the services described in this documentation on 10/25/2022 for the patient as scribed by Susan Akers in my presence. I have reviewed and verified that all the information is accurate and true."

## 2022-10-25 NOTE — REVIEW OF SYSTEMS
[Postnasal Drip] : postnasal drip [Wheezing] : wheezing [Cough] : cough [Insomnia] : insomnia [Negative] : Heme/Lymph

## 2022-10-25 NOTE — PLAN
[FreeTextEntry1] : continue medications \par Start antibiotic \par sent for COVID test \par Sent for X-ray\par use inhaler nasal spray

## 2022-10-25 NOTE — HEALTH RISK ASSESSMENT
[Never] : Never [Never (0 pts)] : Never (0 points) [No] : In the past 12 months have you used drugs other than those required for medical reasons? No [No falls in past year] : Patient reported no falls in the past year [0] : 2) Feeling down, depressed, or hopeless: Not at all (0) [PHQ-2 Negative - No further assessment needed] : PHQ-2 Negative - No further assessment needed [[id number]] : 0

## 2022-10-25 NOTE — PHYSICAL EXAM
[No Acute Distress] : no acute distress [Well Nourished] : well nourished [Well Developed] : well developed [Well-Appearing] : well-appearing [Normal Voice/Communication] : normal voice/communication [Normal Sclera/Conjunctiva] : normal sclera/conjunctiva [PERRL] : pupils equal round and reactive to light [EOMI] : extraocular movements intact [Normal Outer Ear/Nose] : the outer ears and nose were normal in appearance [Normal Oropharynx] : the oropharynx was normal [No JVD] : no jugular venous distention [No Lymphadenopathy] : no lymphadenopathy [Supple] : supple [Thyroid Normal, No Nodules] : the thyroid was normal and there were no nodules present [No Respiratory Distress] : no respiratory distress  [No Accessory Muscle Use] : no accessory muscle use [Scattered Wheezes] : scattered wheezing was heard [Normal Rate] : normal rate  [Regular Rhythm] : with a regular rhythm [Normal S1, S2] : normal S1 and S2 [No Murmur] : no murmur heard [No Carotid Bruits] : no carotid bruits [No Abdominal Bruit] : a ~M bruit was not heard ~T in the abdomen [No Varicosities] : no varicosities [Pedal Pulses Present] : the pedal pulses are present [No Edema] : there was no peripheral edema [No Palpable Aorta] : no palpable aorta [No Extremity Clubbing/Cyanosis] : no extremity clubbing/cyanosis [Soft] : abdomen soft [Non Tender] : non-tender [Non-distended] : non-distended [No Masses] : no abdominal mass palpated [No HSM] : no HSM [Normal Bowel Sounds] : normal bowel sounds [Normal Supraclavicular Nodes] : no supraclavicular lymphadenopathy [Normal Posterior Cervical Nodes] : no posterior cervical lymphadenopathy [Normal Anterior Cervical Nodes] : no anterior cervical lymphadenopathy [No CVA Tenderness] : no CVA  tenderness [No Spinal Tenderness] : no spinal tenderness [No Joint Swelling] : no joint swelling [Grossly Normal Strength/Tone] : grossly normal strength/tone [No Rash] : no rash [Coordination Grossly Intact] : coordination grossly intact [No Focal Deficits] : no focal deficits [Normal Gait] : normal gait [Deep Tendon Reflexes (DTR)] : deep tendon reflexes were 2+ and symmetric [Speech Grossly Normal] : speech grossly normal [Memory Grossly Normal] : memory grossly normal [Normal Affect] : the affect was normal [Alert and Oriented x3] : oriented to person, place, and time [Normal Mood] : the mood was normal [Normal Insight/Judgement] : insight and judgment were intact [de-identified] : s

## 2022-10-26 LAB — SARS-COV-2 N GENE NPH QL NAA+PROBE: NOT DETECTED

## 2022-10-27 ENCOUNTER — RX RENEWAL (OUTPATIENT)
Age: 62
End: 2022-10-27

## 2022-11-10 ENCOUNTER — APPOINTMENT (OUTPATIENT)
Dept: INTERNAL MEDICINE | Facility: CLINIC | Age: 62
End: 2022-11-10

## 2022-11-10 VITALS
TEMPERATURE: 97.5 F | BODY MASS INDEX: 30.78 KG/M2 | WEIGHT: 163 LBS | DIASTOLIC BLOOD PRESSURE: 70 MMHG | HEART RATE: 93 BPM | HEIGHT: 61 IN | SYSTOLIC BLOOD PRESSURE: 122 MMHG | RESPIRATION RATE: 14 BRPM | OXYGEN SATURATION: 96 %

## 2022-11-10 DIAGNOSIS — K59.00 CONSTIPATION, UNSPECIFIED: ICD-10-CM

## 2022-11-10 PROCEDURE — G0008: CPT

## 2022-11-10 PROCEDURE — 90686 IIV4 VACC NO PRSV 0.5 ML IM: CPT

## 2022-11-10 PROCEDURE — 99213 OFFICE O/P EST LOW 20 MIN: CPT | Mod: 25

## 2022-11-10 RX ORDER — PHENAZOPYRIDINE HYDROCHLORIDE 100 MG/1
100 TABLET ORAL 3 TIMES DAILY
Qty: 6 | Refills: 0 | Status: DISCONTINUED | COMMUNITY
Start: 2022-09-16 | End: 2022-11-10

## 2022-11-10 RX ORDER — SULFAMETHOXAZOLE AND TRIMETHOPRIM 800; 160 MG/1; MG/1
800-160 TABLET ORAL
Qty: 14 | Refills: 0 | Status: DISCONTINUED | COMMUNITY
Start: 2022-09-16 | End: 2022-11-10

## 2022-11-10 RX ORDER — DOXYCYCLINE 100 MG/1
100 TABLET, FILM COATED ORAL
Qty: 14 | Refills: 0 | Status: DISCONTINUED | COMMUNITY
Start: 2022-10-25 | End: 2022-11-10

## 2022-11-10 RX ORDER — BENZONATATE 100 MG/1
100 CAPSULE ORAL 3 TIMES DAILY
Qty: 30 | Refills: 0 | Status: DISCONTINUED | COMMUNITY
Start: 2022-10-25 | End: 2022-11-10

## 2022-11-10 RX ORDER — NAPROXEN 500 MG/1
500 TABLET ORAL
Qty: 20 | Refills: 0 | Status: DISCONTINUED | COMMUNITY
Start: 2022-08-05 | End: 2022-11-10

## 2022-11-10 NOTE — PHYSICAL EXAM
[No Acute Distress] : no acute distress [Well-Appearing] : well-appearing [Normal Voice/Communication] : normal voice/communication [No Respiratory Distress] : no respiratory distress  [No Accessory Muscle Use] : no accessory muscle use [Clear to Auscultation] : lungs were clear to auscultation bilaterally [Normal Rate] : normal rate  [Regular Rhythm] : with a regular rhythm [No Murmur] : no murmur heard [Soft] : abdomen soft [Non Tender] : non-tender [No Masses] : no abdominal mass palpated [Normal Bowel Sounds] : normal bowel sounds [No Focal Deficits] : no focal deficits [Alert and Oriented x3] : oriented to person, place, and time

## 2022-11-10 NOTE — HISTORY OF PRESENT ILLNESS
[FreeTextEntry8] : Pt here with complaint of abdominal discomfort due to constipation. Pt reports last BM was 3 days ago. She has this problem on and off, was doing well on align probiotics but constipation returned after she took doxy recently for a URI. She is passing gas. She took magnesium citrate 3 days ago which helped.

## 2022-12-02 ENCOUNTER — APPOINTMENT (OUTPATIENT)
Dept: INTERNAL MEDICINE | Facility: CLINIC | Age: 62
End: 2022-12-02

## 2022-12-02 VITALS
DIASTOLIC BLOOD PRESSURE: 90 MMHG | OXYGEN SATURATION: 97 % | HEIGHT: 61 IN | RESPIRATION RATE: 14 BRPM | TEMPERATURE: 97 F | HEART RATE: 84 BPM | WEIGHT: 160 LBS | BODY MASS INDEX: 30.21 KG/M2 | SYSTOLIC BLOOD PRESSURE: 130 MMHG

## 2022-12-02 DIAGNOSIS — R39.9 UNSPECIFIED SYMPTOMS AND SIGNS INVOLVING THE GENITOURINARY SYSTEM: ICD-10-CM

## 2022-12-02 DIAGNOSIS — R05.9 COUGH, UNSPECIFIED: ICD-10-CM

## 2022-12-02 DIAGNOSIS — J32.9 CHRONIC SINUSITIS, UNSPECIFIED: ICD-10-CM

## 2022-12-02 DIAGNOSIS — R39.15 URGENCY OF URINATION: ICD-10-CM

## 2022-12-02 DIAGNOSIS — R11.0 NAUSEA: ICD-10-CM

## 2022-12-02 LAB
BILIRUB UR QL STRIP: NEGATIVE
CLARITY UR: NORMAL
COLLECTION METHOD: NORMAL
FLUAV SPEC QL CULT: NEGATIVE
FLUBV AG SPEC QL IA: NEGATIVE
GLUCOSE UR-MCNC: NEGATIVE
HCG UR QL: 0.2 EU/DL
HGB UR QL STRIP.AUTO: NEGATIVE
KETONES UR-MCNC: NEGATIVE
LEUKOCYTE ESTERASE UR QL STRIP: NORMAL
NITRITE UR QL STRIP: POSITIVE
PH UR STRIP: 6.5
PROT UR STRIP-MCNC: NORMAL
SP GR UR STRIP: 1.02

## 2022-12-02 PROCEDURE — 81003 URINALYSIS AUTO W/O SCOPE: CPT | Mod: QW

## 2022-12-02 PROCEDURE — 87804 INFLUENZA ASSAY W/OPTIC: CPT | Mod: QW

## 2022-12-02 PROCEDURE — 99214 OFFICE O/P EST MOD 30 MIN: CPT | Mod: 25

## 2022-12-02 NOTE — PHYSICAL EXAM
[Normal Outer Ear/Nose] : the outer ears and nose were normal in appearance [Normal Oropharynx] : the oropharynx was normal [Normal TMs] : both tympanic membranes were normal [No Edema] : there was no peripheral edema [Normal] : affect was normal and insight and judgment were intact [de-identified] : frontal and maxillary sinus pressure

## 2022-12-02 NOTE — PLAN
[FreeTextEntry1] : Symptoms likely secondary to sinus infection and UTI. Rapid flu negative \par Urine dip positive for leuk esterase and nitrites \par Reviewed previous urine cultures, has been susceptible to Augmentin. GIven concurrent sinus infection, will treat with Augmentin BID for 7 days. Pt advised to take with food and probiotic. \par Zofran PRN for nausea per pt request. \par Follow up UA and culture \par Patient advised to to stay hydrated \par ER precautions provided.

## 2022-12-02 NOTE — REVIEW OF SYSTEMS
[Negative] : Psychiatric [FreeTextEntry2] : see hpi  [FreeTextEntry4] : see hpi  [FreeTextEntry6] : see hpi  [FreeTextEntry7] : see hpi  [FreeTextEntry8] : see hpi

## 2022-12-02 NOTE — HISTORY OF PRESENT ILLNESS
[Spouse] : spouse [FreeTextEntry8] : Patient is a 62 year old female who presents today with complaint of sinus pressure, generalized malaise, decreased appetite, nausea, cough, and nasal congestion for the past 3-4 days. She states that she also has been having urinary urgency for the past 3 days, no dysuria, hematuria, or frequency. No associated abdominal pain. She has no fevers or chills. She had Albuterol from another URI she had in October that she has been taking and states that she does feel better on this medication. Tested negative for COVID on home test.

## 2022-12-12 ENCOUNTER — TRANSCRIPTION ENCOUNTER (OUTPATIENT)
Age: 62
End: 2022-12-12

## 2022-12-22 ENCOUNTER — RX RENEWAL (OUTPATIENT)
Age: 62
End: 2022-12-22

## 2023-01-05 ENCOUNTER — NON-APPOINTMENT (OUTPATIENT)
Age: 63
End: 2023-01-05

## 2023-01-17 ENCOUNTER — APPOINTMENT (OUTPATIENT)
Dept: INTERNAL MEDICINE | Facility: CLINIC | Age: 63
End: 2023-01-17
Payer: MEDICARE

## 2023-01-17 VITALS
DIASTOLIC BLOOD PRESSURE: 80 MMHG | HEART RATE: 87 BPM | BODY MASS INDEX: 30.21 KG/M2 | HEIGHT: 61 IN | TEMPERATURE: 98.1 F | SYSTOLIC BLOOD PRESSURE: 124 MMHG | OXYGEN SATURATION: 97 % | RESPIRATION RATE: 14 BRPM | WEIGHT: 160 LBS

## 2023-01-17 PROCEDURE — 99213 OFFICE O/P EST LOW 20 MIN: CPT

## 2023-01-17 NOTE — PHYSICAL EXAM
[No Acute Distress] : no acute distress [Well-Appearing] : well-appearing [Normal Voice/Communication] : normal voice/communication [No Respiratory Distress] : no respiratory distress  [No Accessory Muscle Use] : no accessory muscle use [Normal Rate] : normal rate  [Regular Rhythm] : with a regular rhythm [No Murmur] : no murmur heard [No Focal Deficits] : no focal deficits [Alert and Oriented x3] : oriented to person, place, and time [de-identified] : diffuse expiratory wheezing

## 2023-01-17 NOTE — HISTORY OF PRESENT ILLNESS
[FreeTextEntry8] : Pt with complaint of SOB, wheezing, and cough for the past 3-4 days. Denies sore throat or runny nose. She has been taking albuterol inhaler multiple times a day which helps but only for a short time. She has a hx of asthma but was well-controlled over the past 10 years or so. DEnies fever/chills. She took an at home covid test which was negative.

## 2023-01-19 ENCOUNTER — APPOINTMENT (OUTPATIENT)
Dept: UROLOGY | Facility: CLINIC | Age: 63
End: 2023-01-19
Payer: MEDICARE

## 2023-01-19 VITALS
OXYGEN SATURATION: 96 % | DIASTOLIC BLOOD PRESSURE: 65 MMHG | BODY MASS INDEX: 30.78 KG/M2 | HEART RATE: 80 BPM | HEIGHT: 61 IN | WEIGHT: 163 LBS | SYSTOLIC BLOOD PRESSURE: 121 MMHG

## 2023-01-19 DIAGNOSIS — N28.1 CYST OF KIDNEY, ACQUIRED: ICD-10-CM

## 2023-01-19 PROCEDURE — 99214 OFFICE O/P EST MOD 30 MIN: CPT

## 2023-01-19 NOTE — ASSESSMENT
[FreeTextEntry1] : Reviewed records.\par Discussed labs and imaging. \par Renal and Bladder Ultrasound(1/4/23):\par 1.3 cm non obstructing right lower pole kidney stone. \par 1.3 cm right lower pole renal cyst. \par Right mid pole caliectasis. \par Pre void: 327 ml. \par Post void: 32 ml. \par \par Renal cyst:\par Discussed that Renal cysts are a common finding on routine radiological studies. Autopsy studies in patients over the age of 50 reveal greater than a 50% chance of having at least one simple renal cyst.\par And that renal cysts may be classified as simple or complex depending how they look on imaging. Discussed complexity of renal cysts and its implications as regards malignancy. \par \par Kidney stone:\par Discussed the management options for non obstructing kidney stones- watch and wait Vs Ureteroscopy Vs Shock wave lithotripsy- if radio-opaque or ultrasound amenable. \par Risks and benefits of each modality were discussed. \par Patient will observe. \par  \par Recurrent urinary tract infections:\par Will get Urinalysis and Urine culture.\par Will inform results. \par \par Return to office in 1 year or sooner if any issues.

## 2023-01-19 NOTE — HISTORY OF PRESENT ILLNESS
[FreeTextEntry1] : 62 year old female presents for follow up. \par Complaining of on and off bloating and vague abdominal pain. \par No new urinary tract infection. No flank pain. \par On Prednisone per PCP.\par Had Renal and Bladder Ultrasound. \par \par CT scan(10/21/21): right 2 non obstructing kidney stones: 3 and 10 mm. \par \par On recent Ultrasound was found to have kidney stone. \par Has history of kidney stone, status post right percutaneous nephrolithotomy and endopyelotomy by Dr Hoenig in 2016. \par \par

## 2023-01-24 ENCOUNTER — NON-APPOINTMENT (OUTPATIENT)
Age: 63
End: 2023-01-24

## 2023-01-24 LAB
APPEARANCE: ABNORMAL
BACTERIA UR CULT: ABNORMAL
BACTERIA: ABNORMAL
BILIRUBIN URINE: NEGATIVE
BLOOD URINE: ABNORMAL
CALCIUM OXALATE CRYSTALS: ABNORMAL
COLOR: NORMAL
GLUCOSE QUALITATIVE U: NEGATIVE
HYALINE CASTS: 1 /LPF
KETONES URINE: NEGATIVE
LEUKOCYTE ESTERASE URINE: ABNORMAL
MICROSCOPIC-UA: NORMAL
NITRITE URINE: POSITIVE
PH URINE: 6.5
PROTEIN URINE: NORMAL
RED BLOOD CELLS URINE: 2 /HPF
SPECIFIC GRAVITY URINE: 1.01
SQUAMOUS EPITHELIAL CELLS: 0 /HPF
UROBILINOGEN URINE: NORMAL
WHITE BLOOD CELLS URINE: 88 /HPF

## 2023-02-06 ENCOUNTER — APPOINTMENT (OUTPATIENT)
Dept: PULMONOLOGY | Facility: CLINIC | Age: 63
End: 2023-02-06
Payer: MEDICAID

## 2023-02-06 ENCOUNTER — APPOINTMENT (OUTPATIENT)
Dept: PULMONOLOGY | Facility: CLINIC | Age: 63
End: 2023-02-06
Payer: MEDICARE

## 2023-02-06 VITALS
HEART RATE: 88 BPM | DIASTOLIC BLOOD PRESSURE: 78 MMHG | BODY MASS INDEX: 29.23 KG/M2 | HEIGHT: 63 IN | SYSTOLIC BLOOD PRESSURE: 119 MMHG | WEIGHT: 165 LBS | OXYGEN SATURATION: 96 %

## 2023-02-06 PROCEDURE — 99204 OFFICE O/P NEW MOD 45 MIN: CPT | Mod: 25

## 2023-02-06 PROCEDURE — 94727 GAS DIL/WSHOT DETER LNG VOL: CPT

## 2023-02-06 PROCEDURE — ZZZZZ: CPT

## 2023-02-06 PROCEDURE — 94729 DIFFUSING CAPACITY: CPT

## 2023-02-06 PROCEDURE — 94010 BREATHING CAPACITY TEST: CPT

## 2023-02-06 NOTE — CONSULT LETTER
[FreeTextEntry1] : Dear ,\par \par I had the pleasure of evaluating your patient, EMELINA DENIS today in pulmonary consultation.  Please refer to my attached note for my findings and recommendations.\par \par \par Thank you for allowing me to participate in the care of your patient, please feel free to call with any questions or concerns.\par \par \par Sincerely,\par \par Mary Pradhan MD\par [facility] \par [facility]\par \par

## 2023-02-06 NOTE — ASSESSMENT
[FreeTextEntry1] : cont symbicort and prn albuterol\par fu 3 mo\par \par Total encounter time 30 minutes.\par

## 2023-02-06 NOTE — PROCEDURE
[FreeTextEntry1] : PFT: had some difficulty with exam *, restrictive dysfunction.  Lung volumes reduced. DLCO normal when  corrected for volumes.\par \par \par \par \par Reviewed:\par \par EXAM: [id number] - XR CHEST PA LAT 2V - ORDERED BY: ARIN MEDINA\par \par \par PROCEDURE DATE: 10/25/2022\par \par \par \par INTERPRETATION: PA and lateral chest on October 25, 2022 at 1:25 PM. Patient has bronchitis.\par \par Heart size is within normal limits. Lungs are clear.\par \par Chest is similar to December 6, 2019.\par \par IMPRESSION: Negative chest.\par \par --- End of Report ---\par \par \par \par \par \par \par ALETHA BIRCH MD; Attending Radiologist\par This document has been electronically signed. Oct 25 2022 2:10PM\par

## 2023-02-06 NOTE — HISTORY OF PRESENT ILLNESS
[Never] : never [TextBox_4] : 63F with HTN, DM, asthma\par \par asthma on symbicort and albuterol\par currently well controlled\par dust is a trigger\par never hospitalized. \par symptoms have waxed and waned over the years\par usually worse when [place] is in the [place]

## 2023-02-28 ENCOUNTER — NON-APPOINTMENT (OUTPATIENT)
Age: 63
End: 2023-02-28

## 2023-03-07 NOTE — H&P ADULT - NSHPPOAURINARYCATHETER_GEN_ALL_CORE
Quality 394b: Td/Tdap Immunizations For Adolescents: Patient had one tetanus, diphtheria toxoids and acellular pertussis vaccine (Tdap) on or between the patient's 10th and 13th birthdays. Quality 226: Preventive Care And Screening: Tobacco Use: Screening And Cessation Intervention: Patient screened for tobacco use and is an ex/non-smoker Quality 134: Screening For Clinical Depression And Follow-Up Plan: The patient was screened for depression and the screen was negative and no follow up required Detail Level: Detailed Quality 128: Preventive Care And Screening: Body Mass Index (Bmi) Screening And Follow-Up Plan: BMI is documented within normal parameters and no follow-up plan is required. Quality 110: Preventive Care And Screening: Influenza Immunization: Influenza Immunization previously received during influenza season Quality 394a: Meningococcal Immunizations For Adolescents: Patient had one dose of meningococcal vaccine (serogroups A, C, W, Y) on or between the patient's 11th and 13th birthdays. Quality 431: Preventive Care And Screening: Unhealthy Alcohol Use - Screening: Patient not identified as an unhealthy alcohol user when screened for unhealthy alcohol use using a systematic screening method Quality 394c: Hpv Vaccine For Adolescents: Patient has had at least two HPV vaccines (with at least 146 days between the two) OR three HPV vaccines on or between the patient’s 9th and 13th birthdays. Quality 130: Documentation Of Current Medications In The Medical Record: Current Medications Documented no

## 2023-03-10 ENCOUNTER — APPOINTMENT (OUTPATIENT)
Dept: INTERNAL MEDICINE | Facility: CLINIC | Age: 63
End: 2023-03-10

## 2023-03-10 ENCOUNTER — APPOINTMENT (OUTPATIENT)
Dept: UROLOGY | Facility: CLINIC | Age: 63
End: 2023-03-10
Payer: MEDICAID

## 2023-03-10 DIAGNOSIS — N39.0 URINARY TRACT INFECTION, SITE NOT SPECIFIED: ICD-10-CM

## 2023-03-10 PROCEDURE — 99214 OFFICE O/P EST MOD 30 MIN: CPT

## 2023-03-12 NOTE — PHYSICAL EXAM
[Normal Appearance] : normal appearance [General Appearance - In No Acute Distress] : no acute distress [Abdomen Soft] : soft [Abdomen Tenderness] : non-tender [Costovertebral Angle Tenderness] : no ~M costovertebral angle tenderness [] : no respiratory distress [Oriented To Time, Place, And Person] : oriented to person, place, and time [Normal Station and Gait] : the gait and station were normal for the patient's age

## 2023-03-12 NOTE — ASSESSMENT
[FreeTextEntry1] : Reviewed records.\par Discussed imaging. \par \par Renal and Bladder Ultrasound(1/4/23):\par 1.3 cm non obstructing right lower pole kidney stone. \par 1.3 cm right lower pole renal cyst. \par Right mid pole caliectasis. \par Pre void: 327 ml. \par Post void: 32 ml. \par \par Recurrent urinary tract infections:\par Urinary tract infection:\par Will get Urinalysis and Urine culture. \par Continue Cefuroxime. \par \par Return to office for scheduled appointment or sooner if any issues. \par \par Recommended to go to Emergency department if condition worsens.

## 2023-03-12 NOTE — HISTORY OF PRESENT ILLNESS
[FreeTextEntry1] : 63 year old female presents for left back pain. \par Developed left back pain and dysuria started taking Cefuroxime last night, better now.  \par Denies frequency or urgency.\par Denies hematuria, lower abdominal or flank pain, nausea, vomiting, fever, chills or rigors. \par Has had death in Family, was not drinking enough fluids. Going to [place]. \par Requesting refill for Antibiotics and Levothyroxine.\par \par CT scan(10/21/21): right 2 non obstructing kidney stones: 3 and 10 mm. \par \par On recent Ultrasound was found to have kidney stone. \par Has history of kidney stone, status post right percutaneous nephrolithotomy and endopyelotomy by Dr Hoenig in 2016. \par \par

## 2023-03-13 ENCOUNTER — NON-APPOINTMENT (OUTPATIENT)
Age: 63
End: 2023-03-13

## 2023-03-13 LAB
APPEARANCE: CLEAR
BACTERIA UR CULT: NORMAL
BACTERIA: NEGATIVE
BILIRUBIN URINE: NEGATIVE
BLOOD URINE: ABNORMAL
COLOR: NORMAL
GLUCOSE QUALITATIVE U: NEGATIVE
HYALINE CASTS: 0 /LPF
KETONES URINE: NEGATIVE
LEUKOCYTE ESTERASE URINE: ABNORMAL
MICROSCOPIC-UA: NORMAL
NITRITE URINE: NEGATIVE
PH URINE: 6
PROTEIN URINE: NEGATIVE
RED BLOOD CELLS URINE: 1 /HPF
SPECIFIC GRAVITY URINE: 1.01
SQUAMOUS EPITHELIAL CELLS: 1 /HPF
UROBILINOGEN URINE: NORMAL
WHITE BLOOD CELLS URINE: 4 /HPF

## 2023-03-14 NOTE — ED ADULT NURSE NOTE - NS ED NURSE DC PT EDUCATION RESOURCES
Chief Complaint  Annual Exam    Subjective    History of Present Illness {CC  Problem List  Visit  Diagnosis   Encounters  Notes  Medications  Labs  Result Review Imaging  Media :23}     Marcin Figueroa presents to [facility] for Annual Exam.  History of Present Illness     Here today for annual exam and discuss preventive health maintenance.    Doing fairly well overall. Does have a few issues to discuss today. Mood is fairly well controlled at present. Continues on bupropion, buspirone, and duloxetine. Uses hydroxyzine as needed. Needs a refill of bupropion and duloxetine today. Bupropion was inadvertently missed prescribed as once a day previously. Also continues on atomoxetine to help with attention. Reports good adherence and tolerance overall.    Continues on testosterone for gender affirming hormone therapy. Reports good adherence and tolerance. No problems with self administration, no injection site reactions. Overall happy with the state of his transition. He is getting a hysterectomy in the near future. Interested in a bilateral mastectomy further down the road.    Has had some recent mid back pain around the left scapula. Has known thoracogenic scoliosis that has not been evaluated for some time.    Has some concerns about a receding hairline and androgenic alopecia. Wondering about the best way to address this.    Working on diet and exercise. Weight has been up and down over the past year. Hopeful for some improvements after hysterectomy.    Has good family and social support. 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    Objective     Vital Signs:   /82   Resp 18   Wt 101 kg (223 lb)   BMI 37.11 kg/m²   Physical Exam  Vitals and nursing note reviewed.   Constitutional:       General: He is not in acute distress.     Appearance: Normal appearance. He is not ill-appearing.   Cardiovascular:      Rate and Rhythm: Normal rate and regular rhythm.       Pulses: Normal pulses.      Heart sounds: Normal heart sounds. No murmur heard.  Pulmonary:      Effort: Pulmonary effort is normal. No respiratory distress.      Breath sounds: Normal breath sounds. No rales.   Skin:     Comments: Thinning hair around the hairline's bilateral.   Neurological:      Mental Status: He is alert and oriented to person, place, and time. Mental status is at baseline.   Psychiatric:         Mood and Affect: Mood normal.         Behavior: Behavior normal.          Result Review  Data Reviewed:{ Labs  Result Review  Imaging  Med Tab  Media :23}                   Assessment and Plan {CC Problem List  Visit Diagnosis  ROS  Review (Popup)  Health Maintenance  Quality  BestPractice  Medications  SmartSets  SnapShot Encounters  Media :23}   Diagnoses and all orders for this visit:    1. Routine health maintenance (Primary)    2. Gender dysphoria in adult  -     Testosterone  -     CBC (No Diff)  -     Lipid Panel With LDL / HDL Ratio  -     Comprehensive Metabolic Panel    3. Hormone imbalance  -     Testosterone  -     CBC (No Diff)  -     Lipid Panel With LDL / HDL Ratio  -     Comprehensive Metabolic Panel    4. Therapeutic drug monitoring  -     Testosterone  -     CBC (No Diff)  -     Lipid Panel With LDL / HDL Ratio  -     Comprehensive Metabolic Panel    5. Thoracogenic scoliosis of thoracic region  -     XR Spine Scoliosis AP Standing; Future    6. Androgenic alopecia  -     finasteride (PROPECIA) 1 MG tablet; Take 1 tablet by mouth Daily.  Dispense: 90 tablet; Refill: 1    7. Current moderate episode of major depressive disorder without prior episode (HCC)  -     buPROPion SR (WELLBUTRIN SR) 150 MG 12 hr tablet; Take 1 tablet by mouth 2 (Two) Times a Day.  Dispense: 180 tablet; Refill: 3  -     DULoxetine (CYMBALTA) 20 MG capsule; Take 1 capsule by mouth Daily.  Dispense: 90 capsule; Refill: 3    Orders as above. I will contact him with results as available.    Refills as  requested.    Discussed the use of finasteride to help with androgenic alopecia.    We will get an x-ray of the thoracic spine to evaluate for ongoing scoliosis.    Recommended continued work on healthy lifestyle habits. Recommended follow-up as below. Encouraged communication via [employer] in the meantime.    Patient was given instructions and counseling regarding his condition or for health maintenance advice. Please see specific information pulled into the AVS (placed there by myself) if appropriate.    Return in about 3 months (around 6/14/2023), or if symptoms worsen or fail to improve, for f/u gender-affirming hormone therapy.      ALIRIO Harvey MD    Prevention counseling performed as below: Mindfulness for stress management.     None needed

## 2023-03-16 ENCOUNTER — APPOINTMENT (OUTPATIENT)
Dept: INTERNAL MEDICINE | Facility: CLINIC | Age: 63
End: 2023-03-16

## 2023-03-31 NOTE — ED ADULT TRIAGE NOTE - NS ED NURSE BANDS TYPE
Name band; Florence Luo  [facility]  UROLOGY [address]  Scheduled Appointment: 04/21/2023    [facility]  ENDOCRIN OP [id number]  Scheduled Appointment: 04/25/2023    Guerrero Lai  [facility]  NEUROLOGY [address]  Scheduled Appointment: 05/02/2023

## 2023-04-17 ENCOUNTER — APPOINTMENT (OUTPATIENT)
Dept: INTERNAL MEDICINE | Facility: CLINIC | Age: 63
End: 2023-04-17
Payer: MEDICARE

## 2023-04-17 VITALS
BODY MASS INDEX: 29.23 KG/M2 | SYSTOLIC BLOOD PRESSURE: 122 MMHG | OXYGEN SATURATION: 97 % | HEIGHT: 63 IN | RESPIRATION RATE: 14 BRPM | WEIGHT: 165 LBS | TEMPERATURE: 98.6 F | DIASTOLIC BLOOD PRESSURE: 78 MMHG | HEART RATE: 84 BPM

## 2023-04-17 DIAGNOSIS — K92.1 MELENA: ICD-10-CM

## 2023-04-17 PROCEDURE — 99213 OFFICE O/P EST LOW 20 MIN: CPT

## 2023-04-17 RX ORDER — PREDNISONE 10 MG/1
10 TABLET ORAL
Qty: 32 | Refills: 0 | Status: DISCONTINUED | COMMUNITY
Start: 2023-01-17 | End: 2023-04-17

## 2023-04-17 RX ORDER — ASPIRIN 81 MG/1
81 TABLET, CHEWABLE ORAL
Qty: 30 | Refills: 0 | Status: DISCONTINUED | COMMUNITY
Start: 2022-06-21 | End: 2023-04-17

## 2023-04-17 RX ORDER — ONDANSETRON 4 MG/1
4 TABLET ORAL EVERY 8 HOURS
Qty: 15 | Refills: 0 | Status: DISCONTINUED | COMMUNITY
Start: 2022-12-02 | End: 2023-04-17

## 2023-04-17 RX ORDER — MONTELUKAST 10 MG/1
10 TABLET, FILM COATED ORAL
Qty: 90 | Refills: 1 | Status: DISCONTINUED | COMMUNITY
Start: 2023-02-06 | End: 2023-04-17

## 2023-04-17 RX ORDER — LISINOPRIL 2.5 MG/1
2.5 TABLET ORAL
Qty: 30 | Refills: 2 | Status: DISCONTINUED | COMMUNITY
Start: 2022-08-05 | End: 2023-04-17

## 2023-04-17 NOTE — PHYSICAL EXAM
[No Acute Distress] : no acute distress [Well Nourished] : well nourished [Well Developed] : well developed [Well-Appearing] : well-appearing [Normal Sclera/Conjunctiva] : normal sclera/conjunctiva [PERRL] : pupils equal round and reactive to light [EOMI] : extraocular movements intact [Normal Outer Ear/Nose] : the outer ears and nose were normal in appearance [Normal Oropharynx] : the oropharynx was normal [No JVD] : no jugular venous distention [No Lymphadenopathy] : no lymphadenopathy [Supple] : supple [Thyroid Normal, No Nodules] : the thyroid was normal and there were no nodules present [No Respiratory Distress] : no respiratory distress  [No Accessory Muscle Use] : no accessory muscle use [Clear to Auscultation] : lungs were clear to auscultation bilaterally [Normal Rate] : normal rate  [Regular Rhythm] : with a regular rhythm [Normal S1, S2] : normal S1 and S2 [No Murmur] : no murmur heard [No Carotid Bruits] : no carotid bruits [No Abdominal Bruit] : a ~M bruit was not heard ~T in the abdomen [No Varicosities] : no varicosities [Pedal Pulses Present] : the pedal pulses are present [No Edema] : there was no peripheral edema [No Palpable Aorta] : no palpable aorta [No Extremity Clubbing/Cyanosis] : no extremity clubbing/cyanosis [Soft] : abdomen soft [Non Tender] : non-tender [No Masses] : no abdominal mass palpated [No HSM] : no HSM [Normal Bowel Sounds] : normal bowel sounds [Normal Posterior Cervical Nodes] : no posterior cervical lymphadenopathy [Normal Anterior Cervical Nodes] : no anterior cervical lymphadenopathy [No CVA Tenderness] : no CVA  tenderness [No Spinal Tenderness] : no spinal tenderness [No Joint Swelling] : no joint swelling [Grossly Normal Strength/Tone] : grossly normal strength/tone [No Rash] : no rash [Coordination Grossly Intact] : coordination grossly intact [No Focal Deficits] : no focal deficits [Normal Gait] : normal gait [Deep Tendon Reflexes (DTR)] : deep tendon reflexes were 2+ and symmetric [Normal Affect] : the affect was normal [Normal Insight/Judgement] : insight and judgment were intact [Alert and Oriented x3] : oriented to person, place, and time [FreeTextEntry1] : (+) non-thrombosed external hemorrhoid and ?internal hemorrhoid

## 2023-04-17 NOTE — REVIEW OF SYSTEMS
[Negative] : Heme/Lymph [Abdominal Pain] : no abdominal pain [Constipation] : no constipation [Diarrhea] : diarrhea [Vomiting] : no vomiting [FreeTextEntry7] : see hpi

## 2023-04-17 NOTE — HISTORY OF PRESENT ILLNESS
[Family Member] : family member [FreeTextEntry8] : Pt here for blood in stool. Pt was in [place] a week ago and had n/v/d after eating local food. She went and saw a local doctor who treated her with abx. Patient claims that she later developed severe constipation and had to take medications for constipation. Was doing well and came to [place] 2 days ago and noticed blood in stool and when she wipes. No associated abdominal pain or diarrhea. No fever or chills. Pt not taking blood thinners.

## 2023-04-17 NOTE — PLAN
[FreeTextEntry1] : check cbc \par start anusol for hemorrhoids as possible cause of BRBPR \par abdominal exam benign \par vitals stable \par recommend f/u with colorectal \par will defer stool studies as pt's diarrhea has resolved \par ER precautions provided\par last colonoscopy 4 years ago

## 2023-04-18 LAB
ALBUMIN SERPL ELPH-MCNC: 3.9 G/DL
ALP BLD-CCNC: 66 U/L
ALT SERPL-CCNC: <5 U/L
ANION GAP SERPL CALC-SCNC: 13 MMOL/L
AST SERPL-CCNC: 12 U/L
BASOPHILS # BLD AUTO: 0.05 K/UL
BASOPHILS NFR BLD AUTO: 0.4 %
BILIRUB SERPL-MCNC: <0.2 MG/DL
BUN SERPL-MCNC: 12 MG/DL
CALCIUM SERPL-MCNC: 9.3 MG/DL
CHLORIDE SERPL-SCNC: 102 MMOL/L
CO2 SERPL-SCNC: 21 MMOL/L
CREAT SERPL-MCNC: 0.52 MG/DL
EGFR: 103 ML/MIN/1.73M2
EOSINOPHIL # BLD AUTO: 0.67 K/UL
EOSINOPHIL NFR BLD AUTO: 5.5 %
GLUCOSE SERPL-MCNC: 138 MG/DL
HCT VFR BLD CALC: 41.7 %
HGB BLD-MCNC: 13.3 G/DL
IMM GRANULOCYTES NFR BLD AUTO: 0.7 %
LYMPHOCYTES # BLD AUTO: 3.91 K/UL
LYMPHOCYTES NFR BLD AUTO: 31.9 %
MAN DIFF?: NORMAL
MCHC RBC-ENTMCNC: 29.8 PG
MCHC RBC-ENTMCNC: 31.9 GM/DL
MCV RBC AUTO: 93.5 FL
MONOCYTES # BLD AUTO: 0.8 K/UL
MONOCYTES NFR BLD AUTO: 6.5 %
NEUTROPHILS # BLD AUTO: 6.76 K/UL
NEUTROPHILS NFR BLD AUTO: 55 %
PLATELET # BLD AUTO: 246 K/UL
POTASSIUM SERPL-SCNC: 4.3 MMOL/L
PROT SERPL-MCNC: 6.6 G/DL
RBC # BLD: 4.46 M/UL
RBC # FLD: 13.9 %
SODIUM SERPL-SCNC: 137 MMOL/L
WBC # FLD AUTO: 12.27 K/UL

## 2023-04-24 ENCOUNTER — APPOINTMENT (OUTPATIENT)
Dept: CARDIOLOGY | Facility: CLINIC | Age: 63
End: 2023-04-24
Payer: MEDICARE

## 2023-04-24 ENCOUNTER — NON-APPOINTMENT (OUTPATIENT)
Age: 63
End: 2023-04-24

## 2023-04-24 VITALS
BODY MASS INDEX: 28.35 KG/M2 | DIASTOLIC BLOOD PRESSURE: 83 MMHG | WEIGHT: 160 LBS | SYSTOLIC BLOOD PRESSURE: 169 MMHG | HEIGHT: 63 IN | HEART RATE: 81 BPM | OXYGEN SATURATION: 98 %

## 2023-04-24 PROCEDURE — 93000 ELECTROCARDIOGRAM COMPLETE: CPT

## 2023-04-24 PROCEDURE — 99204 OFFICE O/P NEW MOD 45 MIN: CPT | Mod: 25

## 2023-04-24 NOTE — HISTORY OF PRESENT ILLNESS
[FreeTextEntry1] : Felicia is a pleasant 63-year-old female here for initial evaluation.\par Her past medical history is notable for hypertension, diabetes, and asthma.\par \par Over the last few months, she reports episodes of chest tightness, radiating to her back, that seem to occur in the setting of high blood pressure.  She feels these symptoms when she is doing things around the house.  She does not exercise, though denies dyspnea on exertion when walking up a flight of steps.\par \par She does have a family history of CAD, as her father had an MI in his 50s.\par She denies all toxic habits.  She is a vegetarian.\par She reports having a cardiac work-up greater than 2 years ago, and was told that she has weak "cardiac veins", though the results of her testing are not available.

## 2023-04-24 NOTE — DISCUSSION/SUMMARY
[FreeTextEntry1] : Felicia is reporting episodes of chest discomfort, radiating to her back, in the setting of elevated blood pressure.  Her EKG demonstrates a sinus rhythm, without obvious ischemia or chamber enlargement.  Her physical exam is unremarkable.\par \par We will start with a 2D echocardiogram to confirm the absence of structural heart disease.  She will also have a pharmacologic nuclear stress test to evaluate for ischemia (I am not sure if she will be able to exercise to goal heart rate on a treadmill, so I have ordered a pharmacologic test given her diabetes).  I am increasing her amlodipine to 5 mg twice daily, and she will remain on Toprol.\par \par I have stressed the importance of staying active, and eating right.  We will speak after the above testing, and arrange follow-up. [EKG obtained to assist in diagnosis and management of assessed problem(s)] : EKG obtained to assist in diagnosis and management of assessed problem(s)

## 2023-04-25 DIAGNOSIS — K64.9 UNSPECIFIED HEMORRHOIDS: ICD-10-CM

## 2023-04-25 RX ORDER — HYDROCORTISONE ACETATE 25 MG/1
25 SUPPOSITORY RECTAL TWICE DAILY
Qty: 1 | Refills: 0 | Status: DISCONTINUED | COMMUNITY
Start: 2023-04-17 | End: 2023-04-25

## 2023-05-05 ENCOUNTER — APPOINTMENT (OUTPATIENT)
Dept: CARDIOLOGY | Facility: CLINIC | Age: 63
End: 2023-05-05

## 2023-05-09 ENCOUNTER — RX CHANGE (OUTPATIENT)
Age: 63
End: 2023-05-09

## 2023-05-15 ENCOUNTER — APPOINTMENT (OUTPATIENT)
Dept: INTERNAL MEDICINE | Facility: CLINIC | Age: 63
End: 2023-05-15
Payer: MEDICARE

## 2023-05-15 VITALS
SYSTOLIC BLOOD PRESSURE: 140 MMHG | WEIGHT: 160 LBS | HEIGHT: 63 IN | DIASTOLIC BLOOD PRESSURE: 80 MMHG | HEART RATE: 75 BPM | TEMPERATURE: 98.9 F | BODY MASS INDEX: 28.35 KG/M2 | OXYGEN SATURATION: 97 %

## 2023-05-15 PROCEDURE — 99214 OFFICE O/P EST MOD 30 MIN: CPT

## 2023-05-15 RX ORDER — LANCETS 30 GAUGE
EACH MISCELLANEOUS
Qty: 3 | Refills: 5 | Status: DISCONTINUED | COMMUNITY
Start: 2022-06-27 | End: 2023-05-15

## 2023-05-15 RX ORDER — FLUTICASONE PROPIONATE 50 UG/1
50 SPRAY, METERED NASAL DAILY
Qty: 1 | Refills: 0 | Status: DISCONTINUED | COMMUNITY
Start: 2022-12-02 | End: 2023-05-15

## 2023-05-15 RX ORDER — FLUTICASONE PROPIONATE 50 UG/1
50 SPRAY, METERED NASAL DAILY
Qty: 1 | Refills: 1 | Status: DISCONTINUED | COMMUNITY
Start: 2022-10-25 | End: 2023-05-15

## 2023-05-15 RX ORDER — BLOOD-GLUCOSE METER
W/DEVICE KIT MISCELLANEOUS
Qty: 1 | Refills: 0 | Status: DISCONTINUED | COMMUNITY
Start: 2022-06-27 | End: 2023-05-15

## 2023-05-15 NOTE — HISTORY OF PRESENT ILLNESS
[FreeTextEntry8] : Pt reports complaint of dizziness. She has been checking her BP which has been elevated. Seen by cardio less than a month ago, BP found to be elevated and pt was advised to increase amlodipine to 5mg BID which pt has not done as of yet. Pt also complaining of intermittent, mild, right-sided abdominal pain. No exacerbating or relieving factors. Of note, pt was previously seen for BRBPR after a bout of gastroenteritis during a trip to [place]. BRBPR due to hemorrhoids which have now resolved. Pt has also been using her albuterol 1-2x a day, once or twice a week. Has not been using her symbicort because she feels it does not work as quickly as her albuterol does. She would like blood work to check b12 levels as they have been low with her last PCP and was given b12 injections. \par \par

## 2023-05-15 NOTE — PLAN
[FreeTextEntry1] : Dizziness: \par - likely 2/2 elevated BP \par - advised to increase amlodipine to 5mg BID \par \par HTN: \par - continue metoprolol \par - increase amlodipine to 5mg BID\par - f/u in 2-4 weeks for BP check \par \par Right-sided abdominal pain, intermittent: \par - abdominal exam benign \par - ?due to renal stone, CT stone hunt 2021 shows 10x9mm nonobstructing right-sided stone as well as right sided suberosal cyst \par - check abdominal US \par \par Asthma \par - increase symbicort dose \par - use albuterol PRN \par \par check labs \par

## 2023-05-15 NOTE — REVIEW OF SYSTEMS
[Fatigue] : fatigue [Abdominal Pain] : abdominal pain [Headache] : headache [Dizziness] : dizziness [Negative] : Genitourinary [Fever] : no fever [Chills] : no chills [Sore Throat] : no sore throat [Chest Pain] : no chest pain [Palpitations] : no palpitations [Shortness Of Breath] : no shortness of breath [Vomiting] : no vomiting [FreeTextEntry7] : intermittent

## 2023-05-15 NOTE — PHYSICAL EXAM
[No JVD] : no jugular venous distention [No Lymphadenopathy] : no lymphadenopathy [Supple] : supple [Thyroid Normal, No Nodules] : the thyroid was normal and there were no nodules present [Normal] : affect was normal and insight and judgment were intact [de-identified] : impacted cerumen

## 2023-05-16 LAB
25(OH)D3 SERPL-MCNC: 17.9 NG/ML
ALBUMIN SERPL ELPH-MCNC: 4.7 G/DL
ALP BLD-CCNC: 84 U/L
ALT SERPL-CCNC: 16 U/L
ANION GAP SERPL CALC-SCNC: 12 MMOL/L
AST SERPL-CCNC: 21 U/L
BASOPHILS # BLD AUTO: 0.05 K/UL
BASOPHILS NFR BLD AUTO: 0.6 %
BILIRUB SERPL-MCNC: 0.3 MG/DL
BUN SERPL-MCNC: 9 MG/DL
CALCIUM SERPL-MCNC: 9.9 MG/DL
CHLORIDE SERPL-SCNC: 103 MMOL/L
CHOLEST SERPL-MCNC: 200 MG/DL
CO2 SERPL-SCNC: 26 MMOL/L
CREAT SERPL-MCNC: 0.53 MG/DL
EGFR: 103 ML/MIN/1.73M2
EOSINOPHIL # BLD AUTO: 0.4 K/UL
EOSINOPHIL NFR BLD AUTO: 4.6 %
ESTIMATED AVERAGE GLUCOSE: 151 MG/DL
FOLATE SERPL-MCNC: 19.4 NG/ML
GLUCOSE SERPL-MCNC: 109 MG/DL
HBA1C MFR BLD HPLC: 6.9 %
HCT VFR BLD CALC: 41.8 %
HDLC SERPL-MCNC: 45 MG/DL
HGB BLD-MCNC: 13.1 G/DL
IMM GRANULOCYTES NFR BLD AUTO: 0.5 %
LDLC SERPL CALC-MCNC: 82 MG/DL
LYMPHOCYTES # BLD AUTO: 2.83 K/UL
LYMPHOCYTES NFR BLD AUTO: 32.8 %
MAN DIFF?: NORMAL
MCHC RBC-ENTMCNC: 28.7 PG
MCHC RBC-ENTMCNC: 31.3 GM/DL
MCV RBC AUTO: 91.7 FL
MONOCYTES # BLD AUTO: 0.62 K/UL
MONOCYTES NFR BLD AUTO: 7.2 %
NEUTROPHILS # BLD AUTO: 4.68 K/UL
NEUTROPHILS NFR BLD AUTO: 54.3 %
NONHDLC SERPL-MCNC: 155 MG/DL
PLATELET # BLD AUTO: 362 K/UL
POTASSIUM SERPL-SCNC: 4.2 MMOL/L
PROT SERPL-MCNC: 7.2 G/DL
RBC # BLD: 4.56 M/UL
RBC # FLD: 13.7 %
SODIUM SERPL-SCNC: 141 MMOL/L
TRIGL SERPL-MCNC: 366 MG/DL
TSH SERPL-ACNC: 3.06 UIU/ML
VIT B12 SERPL-MCNC: 266 PG/ML
WBC # FLD AUTO: 8.62 K/UL

## 2023-05-16 RX ORDER — AMOXICILLIN 500 MG/1
500 CAPSULE ORAL
Qty: 21 | Refills: 0 | Status: DISCONTINUED | COMMUNITY
Start: 2023-02-09

## 2023-05-16 RX ORDER — BUDESONIDE AND FORMOTEROL FUMARATE DIHYDRATE 80; 4.5 UG/1; UG/1
80-4.5 AEROSOL RESPIRATORY (INHALATION)
Qty: 10 | Refills: 0 | Status: DISCONTINUED | COMMUNITY
Start: 2023-01-17

## 2023-05-16 RX ORDER — IBUPROFEN 800 MG/1
800 TABLET, FILM COATED ORAL
Qty: 21 | Refills: 0 | Status: DISCONTINUED | COMMUNITY
Start: 2023-02-09

## 2023-05-16 RX ORDER — CHLORHEXIDINE GLUCONATE, 0.12% ORAL RINSE 1.2 MG/ML
0.12 SOLUTION DENTAL
Qty: 473 | Refills: 0 | Status: DISCONTINUED | COMMUNITY
Start: 2023-05-09

## 2023-05-17 ENCOUNTER — NON-APPOINTMENT (OUTPATIENT)
Age: 63
End: 2023-05-17

## 2023-05-17 ENCOUNTER — APPOINTMENT (OUTPATIENT)
Dept: COLORECTAL SURGERY | Facility: CLINIC | Age: 63
End: 2023-05-17
Payer: MEDICARE

## 2023-05-17 VITALS
HEART RATE: 94 BPM | HEIGHT: 63 IN | DIASTOLIC BLOOD PRESSURE: 75 MMHG | SYSTOLIC BLOOD PRESSURE: 120 MMHG | RESPIRATION RATE: 14 BRPM | WEIGHT: 160 LBS | BODY MASS INDEX: 28.35 KG/M2 | TEMPERATURE: 98.7 F

## 2023-05-17 DIAGNOSIS — K64.8 OTHER HEMORRHOIDS: ICD-10-CM

## 2023-05-17 DIAGNOSIS — K62.5 HEMORRHAGE OF ANUS AND RECTUM: ICD-10-CM

## 2023-05-17 PROCEDURE — 99204 OFFICE O/P NEW MOD 45 MIN: CPT | Mod: 25

## 2023-05-17 PROCEDURE — 46600 DIAGNOSTIC ANOSCOPY SPX: CPT

## 2023-05-17 NOTE — HISTORY OF PRESENT ILLNESS
[FreeTextEntry1] : Ms. Brian presents to the office for consultation secondary to recent rectal bleeding associated with gastroenteritis.  She was visiting [facility] when she developed an infectious gastroenteritis.  This led to diarrhea and rectal bleeding with each bowel movement.  After being provided antibiotics, the diarrhea resolved and she no longer had any rectal bleeding.  Last episode of rectal bleeding was approximately 2 to 3 weeks earlier.  Bowel movements are back to baseline and she denies any hemorrhoidal burning, itching or pain.  Last colonoscopy was 4 years prior and otherwise unremarkable.

## 2023-05-17 NOTE — ASSESSMENT
[FreeTextEntry1] : Ms. Brian presents to the office for consultation secondary to rectal bleeding from hemorrhoidal irritation that was likely precipitated by infectious gastroenteritis.  Since this episode of gastroenteritis has resolved, she is no longer experiencing any symptoms of hemorrhoidal irritation, including bleeding.  Physical exam in office today revealed a moderate size anterior anal skin tag as well as full grade 2 internal hemorrhoids.  Given her asymptomatic state, we did not proceed to perform any rubber band ligations.  She was advised, however, that if she developed rectal bleeding with baseline bowel movements, she is certainly a candidate for office ligations, and can return for this to be completed.  Patient understands, and is agreeable.

## 2023-05-17 NOTE — PHYSICAL EXAM
[Normal rectal exam] : exam was normal [Reduce Spontaneously] : a spontaneously reducible (grade II) [Skin Tags] : residual hemorrhoidal skin tags were noted [Normal] : was normal [None] : there was no rectal mass  [Gross Blood] : no gross blood [No Rash or Lesion] : No rash or lesion [Alert] : alert [Oriented to Person] : oriented to person [Oriented to Place] : oriented to place [Oriented to Time] : oriented to time [de-identified] : large engorged Grade II IH in posterior and anterior locations [de-identified] : moderate sized anterior [de-identified] : No apparent distress [de-identified] : Normocephalic atraumatic [de-identified] : Moving all extremities x4

## 2023-05-18 ENCOUNTER — APPOINTMENT (OUTPATIENT)
Dept: INTERNAL MEDICINE | Facility: CLINIC | Age: 63
End: 2023-05-18
Payer: MEDICARE

## 2023-05-18 PROCEDURE — 96372 THER/PROPH/DIAG INJ SC/IM: CPT

## 2023-05-18 RX ORDER — CYANOCOBALAMIN 1000 UG/ML
1000 INJECTION INTRAMUSCULAR; SUBCUTANEOUS
Qty: 0 | Refills: 0 | Status: COMPLETED | OUTPATIENT
Start: 2023-05-18

## 2023-05-18 RX ADMIN — CYANOCOBALAMIN 0 MCG/ML: 1000 INJECTION INTRAMUSCULAR; SUBCUTANEOUS at 00:00

## 2023-05-19 ENCOUNTER — INPATIENT (INPATIENT)
Facility: HOSPITAL | Age: 63
LOS: 3 days | Discharge: HOME | DRG: 69 | End: 2023-05-23
Attending: INTERNAL MEDICINE | Admitting: INTERNAL MEDICINE
Payer: MEDICARE

## 2023-05-19 VITALS
RESPIRATION RATE: 20 BRPM | TEMPERATURE: 98 F | SYSTOLIC BLOOD PRESSURE: 162 MMHG | WEIGHT: 162.92 LBS | DIASTOLIC BLOOD PRESSURE: 82 MMHG | HEIGHT: 63 IN | HEART RATE: 100 BPM | OXYGEN SATURATION: 97 %

## 2023-05-19 DIAGNOSIS — G45.9 TRANSIENT CEREBRAL ISCHEMIC ATTACK, UNSPECIFIED: ICD-10-CM

## 2023-05-19 DIAGNOSIS — Z98.89 OTHER SPECIFIED POSTPROCEDURAL STATES: Chronic | ICD-10-CM

## 2023-05-19 DIAGNOSIS — Z98.51 TUBAL LIGATION STATUS: Chronic | ICD-10-CM

## 2023-05-19 LAB
ALBUMIN SERPL ELPH-MCNC: 4.4 G/DL — SIGNIFICANT CHANGE UP (ref 3.3–5)
ALP SERPL-CCNC: 83 U/L — SIGNIFICANT CHANGE UP (ref 30–120)
ALT FLD-CCNC: 28 U/L DA — SIGNIFICANT CHANGE UP (ref 10–60)
ANION GAP SERPL CALC-SCNC: 13 MMOL/L — SIGNIFICANT CHANGE UP (ref 5–17)
APTT BLD: 29.5 SEC — SIGNIFICANT CHANGE UP (ref 27.5–35.5)
AST SERPL-CCNC: 23 U/L — SIGNIFICANT CHANGE UP (ref 10–40)
BASOPHILS # BLD AUTO: 0.05 K/UL — SIGNIFICANT CHANGE UP (ref 0–0.2)
BASOPHILS NFR BLD AUTO: 0.5 % — SIGNIFICANT CHANGE UP (ref 0–2)
BILIRUB SERPL-MCNC: 0.3 MG/DL — SIGNIFICANT CHANGE UP (ref 0.2–1.2)
BUN SERPL-MCNC: 12 MG/DL — SIGNIFICANT CHANGE UP (ref 7–23)
CALCIUM SERPL-MCNC: 10.6 MG/DL — HIGH (ref 8.4–10.5)
CHLORIDE SERPL-SCNC: 101 MMOL/L — SIGNIFICANT CHANGE UP (ref 96–108)
CO2 SERPL-SCNC: 24 MMOL/L — SIGNIFICANT CHANGE UP (ref 22–31)
CREAT SERPL-MCNC: 0.7 MG/DL — SIGNIFICANT CHANGE UP (ref 0.5–1.3)
EGFR: 96 ML/MIN/1.73M2 — SIGNIFICANT CHANGE UP
EOSINOPHIL # BLD AUTO: 0.45 K/UL — SIGNIFICANT CHANGE UP (ref 0–0.5)
EOSINOPHIL NFR BLD AUTO: 4.8 % — SIGNIFICANT CHANGE UP (ref 0–6)
GLUCOSE SERPL-MCNC: 159 MG/DL — HIGH (ref 70–99)
HCT VFR BLD CALC: 39.1 % — SIGNIFICANT CHANGE UP (ref 34.5–45)
HGB BLD-MCNC: 13 G/DL — SIGNIFICANT CHANGE UP (ref 11.5–15.5)
IMM GRANULOCYTES NFR BLD AUTO: 0.4 % — SIGNIFICANT CHANGE UP (ref 0–0.9)
INR BLD: 0.97 RATIO — SIGNIFICANT CHANGE UP (ref 0.88–1.16)
LYMPHOCYTES # BLD AUTO: 2.38 K/UL — SIGNIFICANT CHANGE UP (ref 1–3.3)
LYMPHOCYTES # BLD AUTO: 25.3 % — SIGNIFICANT CHANGE UP (ref 13–44)
MCHC RBC-ENTMCNC: 29 PG — SIGNIFICANT CHANGE UP (ref 27–34)
MCHC RBC-ENTMCNC: 33.2 GM/DL — SIGNIFICANT CHANGE UP (ref 32–36)
MCV RBC AUTO: 87.3 FL — SIGNIFICANT CHANGE UP (ref 80–100)
MONOCYTES # BLD AUTO: 0.7 K/UL — SIGNIFICANT CHANGE UP (ref 0–0.9)
MONOCYTES NFR BLD AUTO: 7.4 % — SIGNIFICANT CHANGE UP (ref 2–14)
NEUTROPHILS # BLD AUTO: 5.78 K/UL — SIGNIFICANT CHANGE UP (ref 1.8–7.4)
NEUTROPHILS NFR BLD AUTO: 61.6 % — SIGNIFICANT CHANGE UP (ref 43–77)
NRBC # BLD: 0 /100 WBCS — SIGNIFICANT CHANGE UP (ref 0–0)
PLATELET # BLD AUTO: 308 K/UL — SIGNIFICANT CHANGE UP (ref 150–400)
POTASSIUM SERPL-MCNC: 3.7 MMOL/L — SIGNIFICANT CHANGE UP (ref 3.5–5.3)
POTASSIUM SERPL-SCNC: 3.7 MMOL/L — SIGNIFICANT CHANGE UP (ref 3.5–5.3)
PROT SERPL-MCNC: 8.4 G/DL — HIGH (ref 6–8.3)
PROTHROM AB SERPL-ACNC: 11.1 SEC — SIGNIFICANT CHANGE UP (ref 10.5–13.4)
RBC # BLD: 4.48 M/UL — SIGNIFICANT CHANGE UP (ref 3.8–5.2)
RBC # FLD: 13.4 % — SIGNIFICANT CHANGE UP (ref 10.3–14.5)
SODIUM SERPL-SCNC: 138 MMOL/L — SIGNIFICANT CHANGE UP (ref 135–145)
TROPONIN I, HIGH SENSITIVITY RESULT: 4.1 NG/L — SIGNIFICANT CHANGE UP
WBC # BLD: 9.4 K/UL — SIGNIFICANT CHANGE UP (ref 3.8–10.5)
WBC # FLD AUTO: 9.4 K/UL — SIGNIFICANT CHANGE UP (ref 3.8–10.5)

## 2023-05-19 PROCEDURE — 70498 CT ANGIOGRAPHY NECK: CPT | Mod: 26,MA

## 2023-05-19 PROCEDURE — 93010 ELECTROCARDIOGRAM REPORT: CPT

## 2023-05-19 PROCEDURE — 99223 1ST HOSP IP/OBS HIGH 75: CPT

## 2023-05-19 PROCEDURE — 99291 CRITICAL CARE FIRST HOUR: CPT

## 2023-05-19 PROCEDURE — 71045 X-RAY EXAM CHEST 1 VIEW: CPT | Mod: 26

## 2023-05-19 PROCEDURE — 70496 CT ANGIOGRAPHY HEAD: CPT | Mod: 26,MA

## 2023-05-19 RX ORDER — CLOPIDOGREL BISULFATE 75 MG/1
75 TABLET, FILM COATED ORAL DAILY
Refills: 0 | Status: DISCONTINUED | OUTPATIENT
Start: 2023-05-19 | End: 2023-05-23

## 2023-05-19 RX ORDER — CLOPIDOGREL BISULFATE 75 MG/1
300 TABLET, FILM COATED ORAL ONCE
Refills: 0 | Status: COMPLETED | OUTPATIENT
Start: 2023-05-19 | End: 2023-05-19

## 2023-05-19 RX ORDER — ASPIRIN/CALCIUM CARB/MAGNESIUM 324 MG
81 TABLET ORAL DAILY
Refills: 0 | Status: DISCONTINUED | OUTPATIENT
Start: 2023-05-19 | End: 2023-05-23

## 2023-05-19 RX ORDER — SODIUM CHLORIDE 9 MG/ML
1000 INJECTION INTRAMUSCULAR; INTRAVENOUS; SUBCUTANEOUS ONCE
Refills: 0 | Status: COMPLETED | OUTPATIENT
Start: 2023-05-19 | End: 2023-05-19

## 2023-05-19 RX ORDER — ASPIRIN/CALCIUM CARB/MAGNESIUM 324 MG
324 TABLET ORAL ONCE
Refills: 0 | Status: COMPLETED | OUTPATIENT
Start: 2023-05-19 | End: 2023-05-19

## 2023-05-19 RX ORDER — SODIUM CHLORIDE 9 MG/ML
1000 INJECTION INTRAMUSCULAR; INTRAVENOUS; SUBCUTANEOUS
Refills: 0 | Status: DISCONTINUED | OUTPATIENT
Start: 2023-05-19 | End: 2023-05-20

## 2023-05-19 RX ORDER — ASPIRIN/CALCIUM CARB/MAGNESIUM 324 MG
300 TABLET ORAL DAILY
Refills: 0 | Status: DISCONTINUED | OUTPATIENT
Start: 2023-05-19 | End: 2023-05-19

## 2023-05-19 RX ORDER — METFORMIN HYDROCHLORIDE 850 MG/1
1 TABLET ORAL
Qty: 0 | Refills: 0 | DISCHARGE

## 2023-05-19 RX ADMIN — Medication 324 MILLIGRAM(S): at 22:47

## 2023-05-19 RX ADMIN — SODIUM CHLORIDE 1000 MILLILITER(S): 9 INJECTION INTRAMUSCULAR; INTRAVENOUS; SUBCUTANEOUS at 20:04

## 2023-05-19 RX ADMIN — CLOPIDOGREL BISULFATE 300 MILLIGRAM(S): 75 TABLET, FILM COATED ORAL at 22:47

## 2023-05-19 NOTE — ED PROVIDER NOTE - OBJECTIVE STATEMENT
63-year-old female with a history of DM, HLD, asthma, kidney stones, HTN presents with left-sided facial numbness and dizziness.  Crystal translation provided by  at the bedside.  Patient states that 2 days ago she first developed left facial numbness.  She took her blood pressure at home and her systolic was 185.  She went to see her primary care doctor that day and her blood pressure in the office was a systolic of 140s.  Her PMD recommended that she take an extra dose of amlodipine 5 mg at night.  Patient states that the symptoms resolved and she felt well all day yesterday.  At 2 PM today patient reports feeling dizzy with mild left-sided headache.  Around 5 PM today she again developed the left-sided facial numbness and she took another dose of amlodipine.  Her blood pressure at home was 175/103.  Patient denies headache, blurry vision, slurred speech, weakness to her extremities.  Currently states that the facial numbness has resolved but she still reports feeling dizzy.  PMD Dr. Mojica

## 2023-05-19 NOTE — H&P ADULT - PROBLEM SELECTOR PLAN 5
started her on UFH 5000 units sub Q every 8h for DVT prophylaxis, also continue on her low dose Aspirin, in addition to maintenance dose Plavix, discussed with patient &  at the bedside, they understand & agree.

## 2023-05-19 NOTE — H&P ADULT - HISTORY OF PRESENT ILLNESS
This is a 64 y/o F with PMH of HTN, DM 2, Dyslipidemia,  Hypothyroidism, Asthma, Nephrolithiasis s/p lithotomy, and GERD who presented with left lower face numbness started around 2 pm today was on for about 10 minutes, then resolved & was replaced by some "pressure" sensation involving the left half of the head including left eye & left side of the face, believes she was light headed with blurry vision, denies any focal muscle weakness or numbness anywhere else in her body, no associated speech or gait abnormality. Patient checked her blood pressure several times, and believes it went up when she got the face numbness, 's, then back to normal when she took an extra dose of Amlodipine 5 mg. Of note that she got a similar episode3 days ago, and about 4 years ago. Currently patient denies having any symptoms.

## 2023-05-19 NOTE — ED ADULT NURSE NOTE - GASTROINTESTINAL ASSESSMENT
Medical Necessity Information: It is in your best interest to select a reason for this procedure from the list below. All of these items fulfill various CMS LCD requirements except lesion extends to a margin. Include Z78.9 (Other Specified Conditions Influencing Health Status) As An Associated Diagnosis?: No Lab: 157 Referring Physician (Optional): Alvin Surgeon (Optional): Salty Biopsy Photograph Reviewed: No (no photograph available) Size Of Lesion In Cm: 3.1 X Size Of Lesion In Cm (Optional): 1.6 Size Of Margin In Cm: 0.2 Anesthesia Volume In Cc: 6 Eye Clamp Note Details: An eye clamp was used during the procedure. Excision Method: Elliptical Saucerization Depth: dermis and superficial adipose tissue Repair Type: Complex Intermediate / Complex Repair - Final Wound Length In Cm: 8.1 Suturegard Retention Suture: 2-0 Nylon Retention Suture Bite Size: 3 mm Length To Time In Minutes Device Was In Place: 10 Number Of Hemigard Strips Per Side: 1 Complex/Intermediate Repair Variations: Lazy S Complex Requirements: Extensive Undermining Performed?: Yes Width Of Defect Perpendicular To Closure In Cm (Required): 2 Distance Of Undermining In Cm (Required): 2.5 Undermining Type: Entire Wound Debridement Text: The wound edges were debrided prior to proceeding with the closure to facilitate wound healing. Helical Rim Text: The closure involved the helical rim. Vermilion Border Text: The closure involved the vermilion border. - - - Nostril Rim Text: The closure involved the nostril rim. Retention Suture Text: Retention sutures were placed to support the closure and prevent dehiscence. Primary Defect Length (In Cm): 3.5 Secondary Defect Length (In Cm): 0 Epidermal Closure Graft Donor Site (Optional): simple interrupted Detail Level: Detailed Excision Depth: adipose tissue Scalpel Size: 15 blade Anesthesia Type: 1% lidocaine with 1:100,000 epinephrine and a 1:10 solution of 8.4% sodium bicarbonate Hemostasis: Electrocoagulation Estimated Blood Loss (Cc): minimal Undermining Location (Optional): in the superficial subcutaneous fat Deep Sutures: 4-0 PDO Dermal Closure: buried vertical mattress Epidermal Sutures: 4-0 PGCL Epidermal Closure: running subcuticular Wound Care: Petrolatum Dressing: dry sterile dressing Suturegard Intro: Intraoperative tissue expansion was performed, utilizing the SUTUREGARD device, in order to reduce wound tension. Suturegard Body: The suture ends were repeatedly re-tightened and re-clamped to achieve the desired tissue expansion. Hemigard Intro: Due to skin fragility and wound tension, it was decided to use HEMIGARD adhesive retention suture devices to permit a linear closure. The skin was cleaned and dried for a 6cm distance away from the wound. Excessive hair, if present, was removed to allow for adhesion. Hemigard Postcare Instructions: The HEMIGARD strips are to remain completely dry for at least 5-7 days. Graft Donor Site Bandage (Optional-Leave Blank If You Don't Want In Note): Steri-strips and a pressure bandage were applied to the donor site. Positioning (Leave Blank If You Do Not Want): The patient was placed in a comfortable position exposing the surgical site. Complex Repair Preamble Text (Leave Blank If You Do Not Want): Extensive wide undermining was performed. Intermediate Repair Preamble Text (Leave Blank If You Do Not Want): Undermining was performed with blunt dissection. Fusiform Excision Additional Text (Leave Blank If You Do Not Want): The margin was drawn around the clinically apparent lesion.  A fusiform shape was then drawn on the skin incorporating the lesion and margins.  Incisions were then made along these lines to the appropriate tissue plane and the lesion was extirpated. Eliptical Excision Additional Text (Leave Blank If You Do Not Want): The margin was drawn around the clinically apparent lesion.  An elliptical shape was then drawn on the skin incorporating the lesion and margins.  Incisions were then made along these lines to the appropriate tissue plane and the lesion was extirpated. Saucerization Excision Additional Text (Leave Blank If You Do Not Want): The margin was drawn around the clinically apparent lesion.  Incisions were then made along these lines, in a tangential fashion, to the appropriate tissue plane and the lesion was extirpated. Slit Excision Additional Text (Leave Blank If You Do Not Want): A linear line was drawn on the skin overlying the lesion. An incision was made slowly until the lesion was visualized.  Once visualized, the lesion was removed with blunt dissection. Excisional Biopsy Additional Text (Leave Blank If You Do Not Want): The margin was drawn around the clinically apparent lesion. An elliptical shape was then drawn on the skin incorporating the lesion and margins.  Incisions were then made along these lines to the appropriate tissue plane and the lesion was extirpated. Perilesional Excision Additional Text (Leave Blank If You Do Not Want): The margin was drawn around the clinically apparent lesion. Incisions were then made along these lines to the appropriate tissue plane and the lesion was extirpated. Repair Performed By Another Provider Text (Leave Blank If You Do Not Want): After the tissue was excised the defect was repaired by another provider. No Repair - Repaired With Adjacent Surgical Defect Text (Leave Blank If You Do Not Want): After the excision the defect was repaired concurrently with another surgical defect which was in close approximation. Adjacent Tissue Transfer Text: The defect edges were debeveled with a #15 scalpel blade.  Given the location of the defect and the proximity to free margins an adjacent tissue transfer was deemed most appropriate.  Using a sterile surgical marker, an appropriate flap was drawn incorporating the defect and placing the expected incisions within the relaxed skin tension lines where possible.    The area thus outlined was incised deep to adipose tissue with a #15 scalpel blade.  The skin margins were undermined to an appropriate distance in all directions utilizing iris scissors. Advancement Flap (Single) Text: The defect edges were debeveled with a #15 scalpel blade.  Given the location of the defect and the proximity to free margins a single advancement flap was deemed most appropriate.  Using a sterile surgical marker, an appropriate advancement flap was drawn incorporating the defect and placing the expected incisions within the relaxed skin tension lines where possible.    The area thus outlined was incised deep to adipose tissue with a #15 scalpel blade.  The skin margins were undermined to an appropriate distance in all directions utilizing iris scissors. Advancement Flap (Double) Text: The defect edges were debeveled with a #15 scalpel blade.  Given the location of the defect and the proximity to free margins a double advancement flap was deemed most appropriate.  Using a sterile surgical marker, the appropriate advancement flaps were drawn incorporating the defect and placing the expected incisions within the relaxed skin tension lines where possible.    The area thus outlined was incised deep to adipose tissue with a #15 scalpel blade.  The skin margins were undermined to an appropriate distance in all directions utilizing iris scissors. Burow's Advancement Flap Text: The defect edges were debeveled with a #15 scalpel blade.  Given the location of the defect and the proximity to free margins a Burow's advancement flap was deemed most appropriate.  Using a sterile surgical marker, the appropriate advancement flap was drawn incorporating the defect and placing the expected incisions within the relaxed skin tension lines where possible.    The area thus outlined was incised deep to adipose tissue with a #15 scalpel blade.  The skin margins were undermined to an appropriate distance in all directions utilizing iris scissors. Chonodrocutaneous Helical Advancement Flap Text: The defect edges were debeveled with a #15 scalpel blade.  Given the location of the defect and the proximity to free margins a chondrocutaneous helical advancement flap was deemed most appropriate.  Using a sterile surgical marker, the appropriate advancement flap was drawn incorporating the defect and placing the expected incisions within the relaxed skin tension lines where possible.    The area thus outlined was incised deep to adipose tissue with a #15 scalpel blade.  The skin margins were undermined to an appropriate distance in all directions utilizing iris scissors. Crescentic Advancement Flap Text: The defect edges were debeveled with a #15 scalpel blade.  Given the location of the defect and the proximity to free margins a crescentic advancement flap was deemed most appropriate.  Using a sterile surgical marker, the appropriate advancement flap was drawn incorporating the defect and placing the expected incisions within the relaxed skin tension lines where possible.    The area thus outlined was incised deep to adipose tissue with a #15 scalpel blade.  The skin margins were undermined to an appropriate distance in all directions utilizing iris scissors. A-T Advancement Flap Text: The defect edges were debeveled with a #15 scalpel blade.  Given the location of the defect, shape of the defect and the proximity to free margins an A-T advancement flap was deemed most appropriate.  Using a sterile surgical marker, an appropriate advancement flap was drawn incorporating the defect and placing the expected incisions within the relaxed skin tension lines where possible.    The area thus outlined was incised deep to adipose tissue with a #15 scalpel blade.  The skin margins were undermined to an appropriate distance in all directions utilizing iris scissors. O-T Advancement Flap Text: The defect edges were debeveled with a #15 scalpel blade.  Given the location of the defect, shape of the defect and the proximity to free margins an O-T advancement flap was deemed most appropriate.  Using a sterile surgical marker, an appropriate advancement flap was drawn incorporating the defect and placing the expected incisions within the relaxed skin tension lines where possible.    The area thus outlined was incised deep to adipose tissue with a #15 scalpel blade.  The skin margins were undermined to an appropriate distance in all directions utilizing iris scissors. O-L Flap Text: The defect edges were debeveled with a #15 scalpel blade.  Given the location of the defect, shape of the defect and the proximity to free margins an O-L flap was deemed most appropriate.  Using a sterile surgical marker, an appropriate advancement flap was drawn incorporating the defect and placing the expected incisions within the relaxed skin tension lines where possible.    The area thus outlined was incised deep to adipose tissue with a #15 scalpel blade.  The skin margins were undermined to an appropriate distance in all directions utilizing iris scissors. O-Z Flap Text: The defect edges were debeveled with a #15 scalpel blade.  Given the location of the defect, shape of the defect and the proximity to free margins an O-Z flap was deemed most appropriate.  Using a sterile surgical marker, an appropriate transposition flap was drawn incorporating the defect and placing the expected incisions within the relaxed skin tension lines where possible. The area thus outlined was incised deep to adipose tissue with a #15 scalpel blade.  The skin margins were undermined to an appropriate distance in all directions utilizing iris scissors. Double O-Z Flap Text: The defect edges were debeveled with a #15 scalpel blade.  Given the location of the defect, shape of the defect and the proximity to free margins a Double O-Z flap was deemed most appropriate.  Using a sterile surgical marker, an appropriate transposition flap was drawn incorporating the defect and placing the expected incisions within the relaxed skin tension lines where possible. The area thus outlined was incised deep to adipose tissue with a #15 scalpel blade.  The skin margins were undermined to an appropriate distance in all directions utilizing iris scissors. V-Y Flap Text: The defect edges were debeveled with a #15 scalpel blade.  Given the location of the defect, shape of the defect and the proximity to free margins a V-Y flap was deemed most appropriate.  Using a sterile surgical marker, an appropriate advancement flap was drawn incorporating the defect and placing the expected incisions within the relaxed skin tension lines where possible.    The area thus outlined was incised deep to adipose tissue with a #15 scalpel blade.  The skin margins were undermined to an appropriate distance in all directions utilizing iris scissors. Advancement-Rotation Flap Text: The defect edges were debeveled with a #15 scalpel blade.  Given the location of the defect, shape of the defect and the proximity to free margins an advancement-rotation flap was deemed most appropriate.  Using a sterile surgical marker, an appropriate flap was drawn incorporating the defect and placing the expected incisions within the relaxed skin tension lines where possible. The area thus outlined was incised deep to adipose tissue with a #15 scalpel blade.  The skin margins were undermined to an appropriate distance in all directions utilizing iris scissors. Mercedes Flap Text: The defect edges were debeveled with a #15 scalpel blade.  Given the location of the defect, shape of the defect and the proximity to free margins a Mercedes flap was deemed most appropriate.  Using a sterile surgical marker, an appropriate advancement flap was drawn incorporating the defect and placing the expected incisions within the relaxed skin tension lines where possible. The area thus outlined was incised deep to adipose tissue with a #15 scalpel blade.  The skin margins were undermined to an appropriate distance in all directions utilizing iris scissors. Modified Advancement Flap Text: The defect edges were debeveled with a #15 scalpel blade.  Given the location of the defect, shape of the defect and the proximity to free margins a modified advancement flap was deemed most appropriate.  Using a sterile surgical marker, an appropriate advancement flap was drawn incorporating the defect and placing the expected incisions within the relaxed skin tension lines where possible.    The area thus outlined was incised deep to adipose tissue with a #15 scalpel blade.  The skin margins were undermined to an appropriate distance in all directions utilizing iris scissors. Mucosal Advancement Flap Text: Given the location of the defect, shape of the defect and the proximity to free margins a mucosal advancement flap was deemed most appropriate. Incisions were made with a 15 blade scalpel in the appropriate fashion along the cutaneous vermillion border and the mucosal lip. The remaining actinically damaged mucosal tissue was excised.  The mucosal advancement flap was then elevated to the gingival sulcus with care taken to preserve the neurovascular structures and advanced into the primary defect. Care was taken to ensure that precise realignment of the vermilion border was achieved. Peng Advancement Flap Text: The defect edges were debeveled with a #15 scalpel blade.  Given the location of the defect, shape of the defect and the proximity to free margins a Peng advancement flap was deemed most appropriate.  Using a sterile surgical marker, an appropriate advancement flap was drawn incorporating the defect and placing the expected incisions within the relaxed skin tension lines where possible. The area thus outlined was incised deep to adipose tissue with a #15 scalpel blade.  The skin margins were undermined to an appropriate distance in all directions utilizing iris scissors. Hatchet Flap Text: The defect edges were debeveled with a #15 scalpel blade.  Given the location of the defect, shape of the defect and the proximity to free margins a hatchet flap was deemed most appropriate.  Using a sterile surgical marker, an appropriate hatchet flap was drawn incorporating the defect and placing the expected incisions within the relaxed skin tension lines where possible.    The area thus outlined was incised deep to adipose tissue with a #15 scalpel blade.  The skin margins were undermined to an appropriate distance in all directions utilizing iris scissors. Rotation Flap Text: The defect edges were debeveled with a #15 scalpel blade.  Given the location of the defect, shape of the defect and the proximity to free margins a rotation flap was deemed most appropriate.  Using a sterile surgical marker, an appropriate rotation flap was drawn incorporating the defect and placing the expected incisions within the relaxed skin tension lines where possible.    The area thus outlined was incised deep to adipose tissue with a #15 scalpel blade.  The skin margins were undermined to an appropriate distance in all directions utilizing iris scissors. Spiral Flap Text: The defect edges were debeveled with a #15 scalpel blade.  Given the location of the defect, shape of the defect and the proximity to free margins a spiral flap was deemed most appropriate.  Using a sterile surgical marker, an appropriate rotation flap was drawn incorporating the defect and placing the expected incisions within the relaxed skin tension lines where possible. The area thus outlined was incised deep to adipose tissue with a #15 scalpel blade.  The skin margins were undermined to an appropriate distance in all directions utilizing iris scissors. Staged Advancement Flap Text: The defect edges were debeveled with a #15 scalpel blade.  Given the location of the defect, shape of the defect and the proximity to free margins a staged advancement flap was deemed most appropriate.  Using a sterile surgical marker, an appropriate advancement flap was drawn incorporating the defect and placing the expected incisions within the relaxed skin tension lines where possible. The area thus outlined was incised deep to adipose tissue with a #15 scalpel blade.  The skin margins were undermined to an appropriate distance in all directions utilizing iris scissors. Star Wedge Flap Text: The defect edges were debeveled with a #15 scalpel blade.  Given the location of the defect, shape of the defect and the proximity to free margins a star wedge flap was deemed most appropriate.  Using a sterile surgical marker, an appropriate rotation flap was drawn incorporating the defect and placing the expected incisions within the relaxed skin tension lines where possible. The area thus outlined was incised deep to adipose tissue with a #15 scalpel blade.  The skin margins were undermined to an appropriate distance in all directions utilizing iris scissors. Transposition Flap Text: The defect edges were debeveled with a #15 scalpel blade.  Given the location of the defect and the proximity to free margins a transposition flap was deemed most appropriate.  Using a sterile surgical marker, an appropriate transposition flap was drawn incorporating the defect.    The area thus outlined was incised deep to adipose tissue with a #15 scalpel blade.  The skin margins were undermined to an appropriate distance in all directions utilizing iris scissors. Muscle Hinge Flap Text: The defect edges were debeveled with a #15 scalpel blade.  Given the size, depth and location of the defect and the proximity to free margins a muscle hinge flap was deemed most appropriate.  Using a sterile surgical marker, an appropriate hinge flap was drawn incorporating the defect. The area thus outlined was incised with a #15 scalpel blade.  The skin margins were undermined to an appropriate distance in all directions utilizing iris scissors. Mustarde Flap Text: The defect edges were debeveled with a #15 scalpel blade.  Given the size, depth and location of the defect and the proximity to free margins a Mustarde flap was deemed most appropriate.  Using a sterile surgical marker, an appropriate flap was drawn incorporating the defect. The area thus outlined was incised with a #15 scalpel blade.  The skin margins were undermined to an appropriate distance in all directions utilizing iris scissors. Nasal Turnover Hinge Flap Text: The defect edges were debeveled with a #15 scalpel blade.  Given the size, depth, location of the defect and the defect being full thickness a nasal turnover hinge flap was deemed most appropriate.  Using a sterile surgical marker, an appropriate hinge flap was drawn incorporating the defect. The area thus outlined was incised with a #15 scalpel blade. The flap was designed to recreate the nasal mucosal lining and the alar rim. The skin margins were undermined to an appropriate distance in all directions utilizing iris scissors. Nasalis-Muscle-Based Myocutaneous Island Pedicle Flap Text: Using a #15 blade, an incision was made around the donor flap to the level of the nasalis muscle. Wide lateral undermining was then performed in both the subcutaneous plane above the nasalis muscle, and in a submuscular plane just above periosteum. This allowed the formation of a free nasalis muscle axial pedicle (based on the angular artery) which was still attached to the actual cutaneous flap, increasing its mobility and vascular viability. Hemostasis was obtained with pinpoint electrocoagulation. The flap was mobilized into position and the pivotal anchor points positioned and stabilized with buried interrupted sutures. Subcutaneous and dermal tissues were closed in a multilayered fashion with sutures. Tissue redundancies were excised, and the epidermal edges were apposed without significant tension and sutured with sutures. Orbicularis Oris Muscle Flap Text: The defect edges were debeveled with a #15 scalpel blade.  Given that the defect affected the competency of the oral sphincter an obicularis oris muscle flap was deemed most appropriate to restore this competency and normal muscle function.  Using a sterile surgical marker, an appropriate flap was drawn incorporating the defect. The area thus outlined was incised with a #15 scalpel blade. Melolabial Transposition Flap Text: The defect edges were debeveled with a #15 scalpel blade.  Given the location of the defect and the proximity to free margins a melolabial flap was deemed most appropriate.  Using a sterile surgical marker, an appropriate melolabial transposition flap was drawn incorporating the defect.    The area thus outlined was incised deep to adipose tissue with a #15 scalpel blade.  The skin margins were undermined to an appropriate distance in all directions utilizing iris scissors. Rhombic Flap Text: The defect edges were debeveled with a #15 scalpel blade.  Given the location of the defect and the proximity to free margins a rhombic flap was deemed most appropriate.  Using a sterile surgical marker, an appropriate rhombic flap was drawn incorporating the defect.    The area thus outlined was incised deep to adipose tissue with a #15 scalpel blade.  The skin margins were undermined to an appropriate distance in all directions utilizing iris scissors. Rhomboid Transposition Flap Text: The defect edges were debeveled with a #15 scalpel blade.  Given the location of the defect and the proximity to free margins a rhomboid transposition flap was deemed most appropriate.  Using a sterile surgical marker, an appropriate rhomboid flap was drawn incorporating the defect.    The area thus outlined was incised deep to adipose tissue with a #15 scalpel blade.  The skin margins were undermined to an appropriate distance in all directions utilizing iris scissors. Bi-Rhombic Flap Text: The defect edges were debeveled with a #15 scalpel blade.  Given the location of the defect and the proximity to free margins a bi-rhombic flap was deemed most appropriate.  Using a sterile surgical marker, an appropriate rhombic flap was drawn incorporating the defect. The area thus outlined was incised deep to adipose tissue with a #15 scalpel blade.  The skin margins were undermined to an appropriate distance in all directions utilizing iris scissors. Helical Rim Advancement Flap Text: The defect edges were debeveled with a #15 blade scalpel.  Given the location of the defect and the proximity to free margins (helical rim) a double helical rim advancement flap was deemed most appropriate.  Using a sterile surgical marker, the appropriate advancement flaps were drawn incorporating the defect and placing the expected incisions between the helical rim and antihelix where possible.  The area thus outlined was incised through and through with a #15 scalpel blade.  With a skin hook and iris scissors, the flaps were gently and sharply undermined and freed up. Bilateral Helical Rim Advancement Flap Text: The defect edges were debeveled with a #15 blade scalpel.  Given the location of the defect and the proximity to free margins (helical rim) a bilateral helical rim advancement flap was deemed most appropriate.  Using a sterile surgical marker, the appropriate advancement flaps were drawn incorporating the defect and placing the expected incisions between the helical rim and antihelix where possible.  The area thus outlined was incised through and through with a #15 scalpel blade.  With a skin hook and iris scissors, the flaps were gently and sharply undermined and freed up. Ear Star Wedge Flap Text: The defect edges were debeveled with a #15 blade scalpel.  Given the location of the defect and the proximity to free margins (helical rim) an ear star wedge flap was deemed most appropriate.  Using a sterile surgical marker, the appropriate flap was drawn incorporating the defect and placing the expected incisions between the helical rim and antihelix where possible.  The area thus outlined was incised through and through with a #15 scalpel blade. Banner Transposition Flap Text: The defect edges were debeveled with a #15 scalpel blade.  Given the location of the defect and the proximity to free margins a Banner transposition flap was deemed most appropriate.  Using a sterile surgical marker, an appropriate flap drawn around the defect. The area thus outlined was incised deep to adipose tissue with a #15 scalpel blade.  The skin margins were undermined to an appropriate distance in all directions utilizing iris scissors. Bilobed Flap Text: The defect edges were debeveled with a #15 scalpel blade.  Given the location of the defect and the proximity to free margins a bilobe flap was deemed most appropriate.  Using a sterile surgical marker, an appropriate bilobe flap drawn around the defect.    The area thus outlined was incised deep to adipose tissue with a #15 scalpel blade.  The skin margins were undermined to an appropriate distance in all directions utilizing iris scissors. Bilobed Transposition Flap Text: The defect edges were debeveled with a #15 scalpel blade.  Given the location of the defect and the proximity to free margins a bilobed transposition flap was deemed most appropriate.  Using a sterile surgical marker, an appropriate bilobe flap drawn around the defect.    The area thus outlined was incised deep to adipose tissue with a #15 scalpel blade.  The skin margins were undermined to an appropriate distance in all directions utilizing iris scissors. Trilobed Flap Text: The defect edges were debeveled with a #15 scalpel blade.  Given the location of the defect and the proximity to free margins a trilobed flap was deemed most appropriate.  Using a sterile surgical marker, an appropriate trilobed flap drawn around the defect.    The area thus outlined was incised deep to adipose tissue with a #15 scalpel blade.  The skin margins were undermined to an appropriate distance in all directions utilizing iris scissors. Dorsal Nasal Flap Text: The defect edges were debeveled with a #15 scalpel blade.  Given the location of the defect and the proximity to free margins a dorsal nasal flap was deemed most appropriate.  Using a sterile surgical marker, an appropriate dorsal nasal flap was drawn around the defect.    The area thus outlined was incised deep to adipose tissue with a #15 scalpel blade.  The skin margins were undermined to an appropriate distance in all directions utilizing iris scissors. Island Pedicle Flap Text: The defect edges were debeveled with a #15 scalpel blade.  Given the location of the defect, shape of the defect and the proximity to free margins an island pedicle advancement flap was deemed most appropriate.  Using a sterile surgical marker, an appropriate advancement flap was drawn incorporating the defect, outlining the appropriate donor tissue and placing the expected incisions within the relaxed skin tension lines where possible.    The area thus outlined was incised deep to adipose tissue with a #15 scalpel blade.  The skin margins were undermined to an appropriate distance in all directions around the primary defect and laterally outward around the island pedicle utilizing iris scissors.  There was minimal undermining beneath the pedicle flap. Island Pedicle Flap With Canthal Suspension Text: The defect edges were debeveled with a #15 scalpel blade.  Given the location of the defect, shape of the defect and the proximity to free margins an island pedicle advancement flap was deemed most appropriate.  Using a sterile surgical marker, an appropriate advancement flap was drawn incorporating the defect, outlining the appropriate donor tissue and placing the expected incisions within the relaxed skin tension lines where possible. The area thus outlined was incised deep to adipose tissue with a #15 scalpel blade.  The skin margins were undermined to an appropriate distance in all directions around the primary defect and laterally outward around the island pedicle utilizing iris scissors.  There was minimal undermining beneath the pedicle flap. A suspension suture was placed in the canthal tendon to prevent tension and prevent ectropion. Alar Island Pedicle Flap Text: The defect edges were debeveled with a #15 scalpel blade.  Given the location of the defect, shape of the defect and the proximity to the alar rim an island pedicle advancement flap was deemed most appropriate.  Using a sterile surgical marker, an appropriate advancement flap was drawn incorporating the defect, outlining the appropriate donor tissue and placing the expected incisions within the nasal ala running parallel to the alar rim. The area thus outlined was incised with a #15 scalpel blade.  The skin margins were undermined minimally to an appropriate distance in all directions around the primary defect and laterally outward around the island pedicle utilizing iris scissors.  There was minimal undermining beneath the pedicle flap. Double Island Pedicle Flap Text: The defect edges were debeveled with a #15 scalpel blade.  Given the location of the defect, shape of the defect and the proximity to free margins a double island pedicle advancement flap was deemed most appropriate.  Using a sterile surgical marker, an appropriate advancement flap was drawn incorporating the defect, outlining the appropriate donor tissue and placing the expected incisions within the relaxed skin tension lines where possible.    The area thus outlined was incised deep to adipose tissue with a #15 scalpel blade.  The skin margins were undermined to an appropriate distance in all directions around the primary defect and laterally outward around the island pedicle utilizing iris scissors.  There was minimal undermining beneath the pedicle flap. Island Pedicle Flap-Requiring Vessel Identification Text: The defect edges were debeveled with a #15 scalpel blade.  Given the location of the defect, shape of the defect and the proximity to free margins an island pedicle advancement flap was deemed most appropriate.  Using a sterile surgical marker, an appropriate advancement flap was drawn, based on the axial vessel mentioned above, incorporating the defect, outlining the appropriate donor tissue and placing the expected incisions within the relaxed skin tension lines where possible.    The area thus outlined was incised deep to adipose tissue with a #15 scalpel blade.  The skin margins were undermined to an appropriate distance in all directions around the primary defect and laterally outward around the island pedicle utilizing iris scissors.  There was minimal undermining beneath the pedicle flap. Keystone Flap Text: The defect edges were debeveled with a #15 scalpel blade.  Given the location of the defect, shape of the defect a keystone flap was deemed most appropriate.  Using a sterile surgical marker, an appropriate keystone flap was drawn incorporating the defect, outlining the appropriate donor tissue and placing the expected incisions within the relaxed skin tension lines where possible. The area thus outlined was incised deep to adipose tissue with a #15 scalpel blade.  The skin margins were undermined to an appropriate distance in all directions around the primary defect and laterally outward around the flap utilizing iris scissors. O-T Plasty Text: The defect edges were debeveled with a #15 scalpel blade.  Given the location of the defect, shape of the defect and the proximity to free margins an O-T plasty was deemed most appropriate.  Using a sterile surgical marker, an appropriate O-T plasty was drawn incorporating the defect and placing the expected incisions within the relaxed skin tension lines where possible.    The area thus outlined was incised deep to adipose tissue with a #15 scalpel blade.  The skin margins were undermined to an appropriate distance in all directions utilizing iris scissors. O-Z Plasty Text: The defect edges were debeveled with a #15 scalpel blade.  Given the location of the defect, shape of the defect and the proximity to free margins an O-Z plasty (double transposition flap) was deemed most appropriate.  Using a sterile surgical marker, the appropriate transposition flaps were drawn incorporating the defect and placing the expected incisions within the relaxed skin tension lines where possible.    The area thus outlined was incised deep to adipose tissue with a #15 scalpel blade.  The skin margins were undermined to an appropriate distance in all directions utilizing iris scissors.  Hemostasis was achieved with electrocautery.  The flaps were then transposed into place, one clockwise and the other counterclockwise, and anchored with interrupted buried subcutaneous sutures. Double O-Z Plasty Text: The defect edges were debeveled with a #15 scalpel blade.  Given the location of the defect, shape of the defect and the proximity to free margins a Double O-Z plasty (double transposition flap) was deemed most appropriate.  Using a sterile surgical marker, the appropriate transposition flaps were drawn incorporating the defect and placing the expected incisions within the relaxed skin tension lines where possible. The area thus outlined was incised deep to adipose tissue with a #15 scalpel blade.  The skin margins were undermined to an appropriate distance in all directions utilizing iris scissors.  Hemostasis was achieved with electrocautery.  The flaps were then transposed into place, one clockwise and the other counterclockwise, and anchored with interrupted buried subcutaneous sutures. V-Y Plasty Text: The defect edges were debeveled with a #15 scalpel blade.  Given the location of the defect, shape of the defect and the proximity to free margins an V-Y advancement flap was deemed most appropriate.  Using a sterile surgical marker, an appropriate advancement flap was drawn incorporating the defect and placing the expected incisions within the relaxed skin tension lines where possible.    The area thus outlined was incised deep to adipose tissue with a #15 scalpel blade.  The skin margins were undermined to an appropriate distance in all directions utilizing iris scissors. H Plasty Text: Given the location of the defect, shape of the defect and the proximity to free margins a H-plasty was deemed most appropriate for repair.  Using a sterile surgical marker, the appropriate advancement arms of the H-plasty were drawn incorporating the defect and placing the expected incisions within the relaxed skin tension lines where possible. The area thus outlined was incised deep to adipose tissue with a #15 scalpel blade. The skin margins were undermined to an appropriate distance in all directions utilizing iris scissors.  The opposing advancement arms were then advanced into place in opposite direction and anchored with interrupted buried subcutaneous sutures. W Plasty Text: The lesion was extirpated to the level of the fat with a #15 scalpel blade.  Given the location of the defect, shape of the defect and the proximity to free margins a W-plasty was deemed most appropriate for repair.  Using a sterile surgical marker, the appropriate transposition arms of the W-plasty were drawn incorporating the defect and placing the expected incisions within the relaxed skin tension lines where possible.    The area thus outlined was incised deep to adipose tissue with a #15 scalpel blade.  The skin margins were undermined to an appropriate distance in all directions utilizing iris scissors.  The opposing transposition arms were then transposed into place in opposite direction and anchored with interrupted buried subcutaneous sutures. Z Plasty Text: The lesion was extirpated to the level of the fat with a #15 scalpel blade.  Given the location of the defect, shape of the defect and the proximity to free margins a Z-plasty was deemed most appropriate for repair.  Using a sterile surgical marker, the appropriate transposition arms of the Z-plasty were drawn incorporating the defect and placing the expected incisions within the relaxed skin tension lines where possible.    The area thus outlined was incised deep to adipose tissue with a #15 scalpel blade.  The skin margins were undermined to an appropriate distance in all directions utilizing iris scissors.  The opposing transposition arms were then transposed into place in opposite direction and anchored with interrupted buried subcutaneous sutures. Zygomaticofacial Flap Text: Given the location of the defect, shape of the defect and the proximity to free margins a zygomaticofacial flap was deemed most appropriate for repair.  Using a sterile surgical marker, the appropriate flap was drawn incorporating the defect and placing the expected incisions within the relaxed skin tension lines where possible. The area thus outlined was incised deep to adipose tissue with a #15 scalpel blade with preservation of a vascular pedicle.  The skin margins were undermined to an appropriate distance in all directions utilizing iris scissors.  The flap was then placed into the defect and anchored with interrupted buried subcutaneous sutures. Cheek Interpolation Flap Text: A decision was made to reconstruct the defect utilizing an interpolation axial flap and a staged reconstruction.  A telfa template was made of the defect.  This telfa template was then used to outline the Cheek Interpolation flap.  The donor area for the pedicle flap was then injected with anesthesia.  The flap was excised through the skin and subcutaneous tissue down to the layer of the underlying musculature.  The interpolation flap was carefully excised within this deep plane to maintain its blood supply.  The edges of the donor site were undermined.   The donor site was closed in a primary fashion.  The pedicle was then rotated into position and sutured.  Once the tube was sutured into place, adequate blood supply was confirmed with blanching and refill.  The pedicle was then wrapped with xeroform gauze and dressed appropriately with a telfa and gauze bandage to ensure continued blood supply and protect the attached pedicle. Cheek-To-Nose Interpolation Flap Text: A decision was made to reconstruct the defect utilizing an interpolation axial flap and a staged reconstruction.  A telfa template was made of the defect.  This telfa template was then used to outline the Cheek-To-Nose Interpolation flap.  The donor area for the pedicle flap was then injected with anesthesia.  The flap was excised through the skin and subcutaneous tissue down to the layer of the underlying musculature.  The interpolation flap was carefully excised within this deep plane to maintain its blood supply.  The edges of the donor site were undermined.   The donor site was closed in a primary fashion.  The pedicle was then rotated into position and sutured.  Once the tube was sutured into place, adequate blood supply was confirmed with blanching and refill.  The pedicle was then wrapped with xeroform gauze and dressed appropriately with a telfa and gauze bandage to ensure continued blood supply and protect the attached pedicle. Interpolation Flap Text: A decision was made to reconstruct the defect utilizing an interpolation axial flap and a staged reconstruction.  A telfa template was made of the defect.  This telfa template was then used to outline the interpolation flap.  The donor area for the pedicle flap was then injected with anesthesia.  The flap was excised through the skin and subcutaneous tissue down to the layer of the underlying musculature.  The interpolation flap was carefully excised within this deep plane to maintain its blood supply.  The edges of the donor site were undermined.   The donor site was closed in a primary fashion.  The pedicle was then rotated into position and sutured.  Once the tube was sutured into place, adequate blood supply was confirmed with blanching and refill.  The pedicle was then wrapped with xeroform gauze and dressed appropriately with a telfa and gauze bandage to ensure continued blood supply and protect the attached pedicle. Melolabial Interpolation Flap Text: A decision was made to reconstruct the defect utilizing an interpolation axial flap and a staged reconstruction.  A telfa template was made of the defect.  This telfa template was then used to outline the melolabial interpolation flap.  The donor area for the pedicle flap was then injected with anesthesia.  The flap was excised through the skin and subcutaneous tissue down to the layer of the underlying musculature.  The pedicle flap was carefully excised within this deep plane to maintain its blood supply.  The edges of the donor site were undermined.   The donor site was closed in a primary fashion.  The pedicle was then rotated into position and sutured.  Once the tube was sutured into place, adequate blood supply was confirmed with blanching and refill.  The pedicle was then wrapped with xeroform gauze and dressed appropriately with a telfa and gauze bandage to ensure continued blood supply and protect the attached pedicle. Mastoid Interpolation Flap Text: A decision was made to reconstruct the defect utilizing an interpolation axial flap and a staged reconstruction.  A telfa template was made of the defect.  This telfa template was then used to outline the mastoid interpolation flap.  The donor area for the pedicle flap was then injected with anesthesia.  The flap was excised through the skin and subcutaneous tissue down to the layer of the underlying musculature.  The pedicle flap was carefully excised within this deep plane to maintain its blood supply.  The edges of the donor site were undermined.   The donor site was closed in a primary fashion.  The pedicle was then rotated into position and sutured.  Once the tube was sutured into place, adequate blood supply was confirmed with blanching and refill.  The pedicle was then wrapped with xeroform gauze and dressed appropriately with a telfa and gauze bandage to ensure continued blood supply and protect the attached pedicle. Posterior Auricular Interpolation Flap Text: A decision was made to reconstruct the defect utilizing an interpolation axial flap and a staged reconstruction.  A telfa template was made of the defect.  This telfa template was then used to outline the posterior auricular interpolation flap.  The donor area for the pedicle flap was then injected with anesthesia.  The flap was excised through the skin and subcutaneous tissue down to the layer of the underlying musculature.  The pedicle flap was carefully excised within this deep plane to maintain its blood supply.  The edges of the donor site were undermined.   The donor site was closed in a primary fashion.  The pedicle was then rotated into position and sutured.  Once the tube was sutured into place, adequate blood supply was confirmed with blanching and refill.  The pedicle was then wrapped with xeroform gauze and dressed appropriately with a telfa and gauze bandage to ensure continued blood supply and protect the attached pedicle. Paramedian Forehead Flap Text: A decision was made to reconstruct the defect utilizing an interpolation axial flap and a staged reconstruction.  A telfa template was made of the defect.  This telfa template was then used to outline the paramedian forehead pedicle flap.  The donor area for the pedicle flap was then injected with anesthesia.  The flap was excised through the skin and subcutaneous tissue down to the layer of the underlying musculature.  The pedicle flap was carefully excised within this deep plane to maintain its blood supply.  The edges of the donor site were undermined.   The donor site was closed in a primary fashion.  The pedicle was then rotated into position and sutured.  Once the tube was sutured into place, adequate blood supply was confirmed with blanching and refill.  The pedicle was then wrapped with xeroform gauze and dressed appropriately with a telfa and gauze bandage to ensure continued blood supply and protect the attached pedicle. Abbe Flap (Upper To Lower Lip) Text: The defect of the lower lip was assessed and measured.  Given the location and size of the defect, an Abbe flap was deemed most appropriate. Using a sterile surgical marker, an appropriate Abbe flap was measured and drawn on the upper lip. Local anesthesia was then infiltrated.  A scalpel was then used to incise the upper lip through and through the skin, vermilion, muscle and mucosa, leaving the flap pedicled on the opposite side.  The flap was then rotated and transferred to the lower lip defect.  The flap was then sutured into place with a three layer technique, closing the orbicularis oris muscle layer with subcutaneous buried sutures, followed by a mucosal layer and an epidermal layer. Abbe Flap (Lower To Upper Lip) Text: The defect of the upper lip was assessed and measured.  Given the location and size of the defect, an Abbe flap was deemed most appropriate. Using a sterile surgical marker, an appropriate Abbe flap was measured and drawn on the lower lip. Local anesthesia was then infiltrated. A scalpel was then used to incise the upper lip through and through the skin, vermilion, muscle and mucosa, leaving the flap pedicled on the opposite side.  The flap was then rotated and transferred to the lower lip defect.  The flap was then sutured into place with a three layer technique, closing the orbicularis oris muscle layer with subcutaneous buried sutures, followed by a mucosal layer and an epidermal layer. Estlander Flap (Upper To Lower Lip) Text: The defect of the lower lip was assessed and measured.  Given the location and size of the defect, an Estlander flap was deemed most appropriate. Using a sterile surgical marker, an appropriate Estlander flap was measured and drawn on the upper lip. Local anesthesia was then infiltrated. A scalpel was then used to incise the lateral aspect of the flap, through skin, muscle and mucosa, leaving the flap pedicled medially.  The flap was then rotated and positioned to fill the lower lip defect.  The flap was then sutured into place with a three layer technique, closing the orbicularis oris muscle layer with subcutaneous buried sutures, followed by a mucosal layer and an epidermal layer. Lip Wedge Excision Repair Text: Given the location of the defect and the proximity to free margins a full thickness wedge repair was deemed most appropriate.  Using a sterile surgical marker, the appropriate repair was drawn incorporating the defect and placing the expected incisions perpendicular to the vermilion border.  The vermilion border was also meticulously outlined to ensure appropriate reapproximation during the repair.  The area thus outlined was incised through and through with a #15 scalpel blade.  The muscularis and dermis were reaproximated with deep sutures following hemostasis. Care was taken to realign the vermilion border before proceeding with the superficial closure.  Once the vermilion was realigned the superfical and mucosal closure was finished. Ftsg Text: The defect edges were debeveled with a #15 scalpel blade.  Given the location of the defect, shape of the defect and the proximity to free margins a full thickness skin graft was deemed most appropriate.  Using a sterile surgical marker, the primary defect shape was transferred to the donor site. The area thus outlined was incised deep to adipose tissue with a #15 scalpel blade.  The harvested graft was then trimmed of adipose tissue until only dermis and epidermis was left.  The skin margins of the secondary defect were undermined to an appropriate distance in all directions utilizing iris scissors.  The secondary defect was closed with interrupted buried subcutaneous sutures.  The skin edges were then re-apposed with running  sutures.  The skin graft was then placed in the primary defect and oriented appropriately. Split-Thickness Skin Graft Text: The defect edges were debeveled with a #15 scalpel blade.  Given the location of the defect, shape of the defect and the proximity to free margins a split thickness skin graft was deemed most appropriate.  Using a sterile surgical marker, the primary defect shape was transferred to the donor site. The split thickness graft was then harvested.  The skin graft was then placed in the primary defect and oriented appropriately. Burow's Graft Text: The defect edges were debeveled with a #15 scalpel blade.  Given the location of the defect, shape of the defect, the proximity to free margins and the presence of a standing cone deformity a Burow's skin graft was deemed most appropriate. The standing cone was removed and this tissue was then trimmed to the shape of the primary defect. The adipose tissue was also removed until only dermis and epidermis were left.  The skin margins of the secondary defect were undermined to an appropriate distance in all directions utilizing iris scissors.  The secondary defect was closed with interrupted buried subcutaneous sutures.  The skin edges were then re-apposed with running  sutures.  The skin graft was then placed in the primary defect and oriented appropriately. Cartilage Graft Text: The defect edges were debeveled with a #15 scalpel blade.  Given the location of the defect, shape of the defect, the fact the defect involved a full thickness cartilage defect a cartilage graft was deemed most appropriate.  An appropriate donor site was identified, cleansed, and anesthetized. The cartilage graft was then harvested and transferred to the recipient site, oriented appropriately and then sutured into place.  The secondary defect was then repaired using a primary closure. Composite Graft Text: The defect edges were debeveled with a #15 scalpel blade.  Given the location of the defect, shape of the defect, the proximity to free margins and the fact the defect was full thickness a composite graft was deemed most appropriate.  The defect was outline and then transferred to the donor site.  A full thickness graft was then excised from the donor site. The graft was then placed in the primary defect, oriented appropriately and then sutured into place.  The secondary defect was then repaired using a primary closure. Epidermal Autograft Text: The defect edges were debeveled with a #15 scalpel blade.  Given the location of the defect, shape of the defect and the proximity to free margins an epidermal autograft was deemed most appropriate.  Using a sterile surgical marker, the primary defect shape was transferred to the donor site. The epidermal graft was then harvested.  The skin graft was then placed in the primary defect and oriented appropriately. Dermal Autograft Text: The defect edges were debeveled with a #15 scalpel blade.  Given the location of the defect, shape of the defect and the proximity to free margins a dermal autograft was deemed most appropriate.  Using a sterile surgical marker, the primary defect shape was transferred to the donor site. The area thus outlined was incised deep to adipose tissue with a #15 scalpel blade.  The harvested graft was then trimmed of adipose and epidermal tissue until only dermis was left.  The skin graft was then placed in the primary defect and oriented appropriately. Skin Substitute Text: The defect edges were debeveled with a #15 scalpel blade.  Given the location of the defect, shape of the defect and the proximity to free margins a skin substitute graft was deemed most appropriate.  The graft material was trimmed to fit the size of the defect. The graft was then placed in the primary defect and oriented appropriately. Tissue Cultured Epidermal Autograft Text: The defect edges were debeveled with a #15 scalpel blade.  Given the location of the defect, shape of the defect and the proximity to free margins a tissue cultured epidermal autograft was deemed most appropriate.  The graft was then trimmed to fit the size of the defect.  The graft was then placed in the primary defect and oriented appropriately. Xenograft Text: The defect edges were debeveled with a #15 scalpel blade.  Given the location of the defect, shape of the defect and the proximity to free margins a xenograft was deemed most appropriate.  The graft was then trimmed to fit the size of the defect.  The graft was then placed in the primary defect and oriented appropriately. Purse String (Intermediate) Text: Given the location of the defect and the characteristics of the surrounding skin a purse string intermediate closure was deemed most appropriate.  Undermining was performed circumferentially around the surgical defect.  A purse string suture was then placed and tightened. Purse String (Simple) Text: Given the location of the defect and the characteristics of the surrounding skin a purse string simple closure was deemed most appropriate.  Undermining was performed circumferentially around the surgical defect.  A purse string suture was then placed and tightened. Complex Repair And Single Advancement Flap Text: The defect edges were debeveled with a #15 scalpel blade.  The primary defect was closed partially with a complex linear closure.  Given the location of the remaining defect, shape of the defect and the proximity to free margins a single advancement flap was deemed most appropriate for complete closure of the defect.  Using a sterile surgical marker, an appropriate advancement flap was drawn incorporating the defect and placing the expected incisions within the relaxed skin tension lines where possible.    The area thus outlined was incised deep to adipose tissue with a #15 scalpel blade.  The skin margins were undermined to an appropriate distance in all directions utilizing iris scissors. Complex Repair And Double Advancement Flap Text: The defect edges were debeveled with a #15 scalpel blade.  The primary defect was closed partially with a complex linear closure.  Given the location of the remaining defect, shape of the defect and the proximity to free margins a double advancement flap was deemed most appropriate for complete closure of the defect.  Using a sterile surgical marker, an appropriate advancement flap was drawn incorporating the defect and placing the expected incisions within the relaxed skin tension lines where possible.    The area thus outlined was incised deep to adipose tissue with a #15 scalpel blade.  The skin margins were undermined to an appropriate distance in all directions utilizing iris scissors. Complex Repair And Modified Advancement Flap Text: The defect edges were debeveled with a #15 scalpel blade.  The primary defect was closed partially with a complex linear closure.  Given the location of the remaining defect, shape of the defect and the proximity to free margins a modified advancement flap was deemed most appropriate for complete closure of the defect.  Using a sterile surgical marker, an appropriate advancement flap was drawn incorporating the defect and placing the expected incisions within the relaxed skin tension lines where possible.    The area thus outlined was incised deep to adipose tissue with a #15 scalpel blade.  The skin margins were undermined to an appropriate distance in all directions utilizing iris scissors. Complex Repair And A-T Advancement Flap Text: The defect edges were debeveled with a #15 scalpel blade.  The primary defect was closed partially with a complex linear closure.  Given the location of the remaining defect, shape of the defect and the proximity to free margins an A-T advancement flap was deemed most appropriate for complete closure of the defect.  Using a sterile surgical marker, an appropriate advancement flap was drawn incorporating the defect and placing the expected incisions within the relaxed skin tension lines where possible.    The area thus outlined was incised deep to adipose tissue with a #15 scalpel blade.  The skin margins were undermined to an appropriate distance in all directions utilizing iris scissors. Complex Repair And O-T Advancement Flap Text: The defect edges were debeveled with a #15 scalpel blade.  The primary defect was closed partially with a complex linear closure.  Given the location of the remaining defect, shape of the defect and the proximity to free margins an O-T advancement flap was deemed most appropriate for complete closure of the defect.  Using a sterile surgical marker, an appropriate advancement flap was drawn incorporating the defect and placing the expected incisions within the relaxed skin tension lines where possible.    The area thus outlined was incised deep to adipose tissue with a #15 scalpel blade.  The skin margins were undermined to an appropriate distance in all directions utilizing iris scissors. Complex Repair And O-L Flap Text: The defect edges were debeveled with a #15 scalpel blade.  The primary defect was closed partially with a complex linear closure.  Given the location of the remaining defect, shape of the defect and the proximity to free margins an O-L flap was deemed most appropriate for complete closure of the defect.  Using a sterile surgical marker, an appropriate flap was drawn incorporating the defect and placing the expected incisions within the relaxed skin tension lines where possible.    The area thus outlined was incised deep to adipose tissue with a #15 scalpel blade.  The skin margins were undermined to an appropriate distance in all directions utilizing iris scissors. Complex Repair And Bilobe Flap Text: The defect edges were debeveled with a #15 scalpel blade.  The primary defect was closed partially with a complex linear closure.  Given the location of the remaining defect, shape of the defect and the proximity to free margins a bilobe flap was deemed most appropriate for complete closure of the defect.  Using a sterile surgical marker, an appropriate advancement flap was drawn incorporating the defect and placing the expected incisions within the relaxed skin tension lines where possible.    The area thus outlined was incised deep to adipose tissue with a #15 scalpel blade.  The skin margins were undermined to an appropriate distance in all directions utilizing iris scissors. Complex Repair And Melolabial Flap Text: The defect edges were debeveled with a #15 scalpel blade.  The primary defect was closed partially with a complex linear closure.  Given the location of the remaining defect, shape of the defect and the proximity to free margins a melolabial flap was deemed most appropriate for complete closure of the defect.  Using a sterile surgical marker, an appropriate advancement flap was drawn incorporating the defect and placing the expected incisions within the relaxed skin tension lines where possible.    The area thus outlined was incised deep to adipose tissue with a #15 scalpel blade.  The skin margins were undermined to an appropriate distance in all directions utilizing iris scissors. Complex Repair And Rotation Flap Text: The defect edges were debeveled with a #15 scalpel blade.  The primary defect was closed partially with a complex linear closure.  Given the location of the remaining defect, shape of the defect and the proximity to free margins a rotation flap was deemed most appropriate for complete closure of the defect.  Using a sterile surgical marker, an appropriate advancement flap was drawn incorporating the defect and placing the expected incisions within the relaxed skin tension lines where possible.    The area thus outlined was incised deep to adipose tissue with a #15 scalpel blade.  The skin margins were undermined to an appropriate distance in all directions utilizing iris scissors. Complex Repair And Rhombic Flap Text: The defect edges were debeveled with a #15 scalpel blade.  The primary defect was closed partially with a complex linear closure.  Given the location of the remaining defect, shape of the defect and the proximity to free margins a rhombic flap was deemed most appropriate for complete closure of the defect.  Using a sterile surgical marker, an appropriate advancement flap was drawn incorporating the defect and placing the expected incisions within the relaxed skin tension lines where possible.    The area thus outlined was incised deep to adipose tissue with a #15 scalpel blade.  The skin margins were undermined to an appropriate distance in all directions utilizing iris scissors. Complex Repair And Transposition Flap Text: The defect edges were debeveled with a #15 scalpel blade.  The primary defect was closed partially with a complex linear closure.  Given the location of the remaining defect, shape of the defect and the proximity to free margins a transposition flap was deemed most appropriate for complete closure of the defect.  Using a sterile surgical marker, an appropriate advancement flap was drawn incorporating the defect and placing the expected incisions within the relaxed skin tension lines where possible.    The area thus outlined was incised deep to adipose tissue with a #15 scalpel blade.  The skin margins were undermined to an appropriate distance in all directions utilizing iris scissors. Complex Repair And V-Y Plasty Text: The defect edges were debeveled with a #15 scalpel blade.  The primary defect was closed partially with a complex linear closure.  Given the location of the remaining defect, shape of the defect and the proximity to free margins a V-Y plasty was deemed most appropriate for complete closure of the defect.  Using a sterile surgical marker, an appropriate advancement flap was drawn incorporating the defect and placing the expected incisions within the relaxed skin tension lines where possible.    The area thus outlined was incised deep to adipose tissue with a #15 scalpel blade.  The skin margins were undermined to an appropriate distance in all directions utilizing iris scissors. Complex Repair And M Plasty Text: The defect edges were debeveled with a #15 scalpel blade.  The primary defect was closed partially with a complex linear closure.  Given the location of the remaining defect, shape of the defect and the proximity to free margins an M plasty was deemed most appropriate for complete closure of the defect.  Using a sterile surgical marker, an appropriate advancement flap was drawn incorporating the defect and placing the expected incisions within the relaxed skin tension lines where possible.    The area thus outlined was incised deep to adipose tissue with a #15 scalpel blade.  The skin margins were undermined to an appropriate distance in all directions utilizing iris scissors. Complex Repair And Double M Plasty Text: The defect edges were debeveled with a #15 scalpel blade.  The primary defect was closed partially with a complex linear closure.  Given the location of the remaining defect, shape of the defect and the proximity to free margins a double M plasty was deemed most appropriate for complete closure of the defect.  Using a sterile surgical marker, an appropriate advancement flap was drawn incorporating the defect and placing the expected incisions within the relaxed skin tension lines where possible.    The area thus outlined was incised deep to adipose tissue with a #15 scalpel blade.  The skin margins were undermined to an appropriate distance in all directions utilizing iris scissors. Complex Repair And W Plasty Text: The defect edges were debeveled with a #15 scalpel blade.  The primary defect was closed partially with a complex linear closure.  Given the location of the remaining defect, shape of the defect and the proximity to free margins a W plasty was deemed most appropriate for complete closure of the defect.  Using a sterile surgical marker, an appropriate advancement flap was drawn incorporating the defect and placing the expected incisions within the relaxed skin tension lines where possible.    The area thus outlined was incised deep to adipose tissue with a #15 scalpel blade.  The skin margins were undermined to an appropriate distance in all directions utilizing iris scissors. Complex Repair And Z Plasty Text: The defect edges were debeveled with a #15 scalpel blade.  The primary defect was closed partially with a complex linear closure.  Given the location of the remaining defect, shape of the defect and the proximity to free margins a Z plasty was deemed most appropriate for complete closure of the defect.  Using a sterile surgical marker, an appropriate advancement flap was drawn incorporating the defect and placing the expected incisions within the relaxed skin tension lines where possible.    The area thus outlined was incised deep to adipose tissue with a #15 scalpel blade.  The skin margins were undermined to an appropriate distance in all directions utilizing iris scissors. Complex Repair And Dorsal Nasal Flap Text: The defect edges were debeveled with a #15 scalpel blade.  The primary defect was closed partially with a complex linear closure.  Given the location of the remaining defect, shape of the defect and the proximity to free margins a dorsal nasal flap was deemed most appropriate for complete closure of the defect.  Using a sterile surgical marker, an appropriate flap was drawn incorporating the defect and placing the expected incisions within the relaxed skin tension lines where possible.    The area thus outlined was incised deep to adipose tissue with a #15 scalpel blade.  The skin margins were undermined to an appropriate distance in all directions utilizing iris scissors. Complex Repair And Ftsg Text: The defect edges were debeveled with a #15 scalpel blade.  The primary defect was closed partially with a complex linear closure.  Given the location of the defect, shape of the defect and the proximity to free margins a full thickness skin graft was deemed most appropriate to repair the remaining defect.  The graft was trimmed to fit the size of the remaining defect.  The graft was then placed in the primary defect, oriented appropriately, and sutured into place. Complex Repair And Burow's Graft Text: The defect edges were debeveled with a #15 scalpel blade.  The primary defect was closed partially with a complex linear closure.  Given the location of the defect, shape of the defect, the proximity to free margins and the presence of a standing cone deformity a Burow's graft was deemed most appropriate to repair the remaining defect.  The graft was trimmed to fit the size of the remaining defect.  The graft was then placed in the primary defect, oriented appropriately, and sutured into place. Complex Repair And Split-Thickness Skin Graft Text: The defect edges were debeveled with a #15 scalpel blade.  The primary defect was closed partially with a complex linear closure.  Given the location of the defect, shape of the defect and the proximity to free margins a split thickness skin graft was deemed most appropriate to repair the remaining defect.  The graft was trimmed to fit the size of the remaining defect.  The graft was then placed in the primary defect, oriented appropriately, and sutured into place. Complex Repair And Epidermal Autograft Text: The defect edges were debeveled with a #15 scalpel blade.  The primary defect was closed partially with a complex linear closure.  Given the location of the defect, shape of the defect and the proximity to free margins an epidermal autograft was deemed most appropriate to repair the remaining defect.  The graft was trimmed to fit the size of the remaining defect.  The graft was then placed in the primary defect, oriented appropriately, and sutured into place. Complex Repair And Dermal Autograft Text: The defect edges were debeveled with a #15 scalpel blade.  The primary defect was closed partially with a complex linear closure.  Given the location of the defect, shape of the defect and the proximity to free margins an dermal autograft was deemed most appropriate to repair the remaining defect.  The graft was trimmed to fit the size of the remaining defect.  The graft was then placed in the primary defect, oriented appropriately, and sutured into place. Complex Repair And Tissue Cultured Epidermal Autograft Text: The defect edges were debeveled with a #15 scalpel blade.  The primary defect was closed partially with a complex linear closure.  Given the location of the defect, shape of the defect and the proximity to free margins an tissue cultured epidermal autograft was deemed most appropriate to repair the remaining defect.  The graft was trimmed to fit the size of the remaining defect.  The graft was then placed in the primary defect, oriented appropriately, and sutured into place. Complex Repair And Xenograft Text: The defect edges were debeveled with a #15 scalpel blade.  The primary defect was closed partially with a complex linear closure.  Given the location of the defect, shape of the defect and the proximity to free margins a xenograft was deemed most appropriate to repair the remaining defect.  The graft was trimmed to fit the size of the remaining defect.  The graft was then placed in the primary defect, oriented appropriately, and sutured into place. Complex Repair And Skin Substitute Graft Text: The defect edges were debeveled with a #15 scalpel blade.  The primary defect was closed partially with a complex linear closure.  Given the location of the remaining defect, shape of the defect and the proximity to free margins a skin substitute graft was deemed most appropriate to repair the remaining defect.  The graft was trimmed to fit the size of the remaining defect.  The graft was then placed in the primary defect, oriented appropriately, and sutured into place. Path Notes (To The Dermatopathologist): Please check margins. Consent was obtained from the patient. The risks and benefits to therapy were discussed in detail. Specifically, the risks of infection, scarring, bleeding, prolonged wound healing, incomplete removal, allergy to anesthesia, nerve injury and recurrence were addressed. Prior to the procedure, the treatment site was clearly identified and confirmed by the patient. All components of Universal Protocol/PAUSE Rule completed. Post-Care Instructions: I reviewed with the patient in detail post-care instructions. Patient is not to engage in any heavy lifting, exercise, or swimming for the next 14 days. Should the patient develop any fevers, chills, bleeding, severe pain patient will contact the office immediately. Home Suture Removal Text: Patient was provided a home suture removal kit and will remove their sutures at home.  If they have any questions or difficulties they will call the office. Where Do You Want The Question To Include Opioid Counseling Located?: Case Summary Tab Billing Type: Third-Party Bill Information: Selecting Yes will display possible errors in your note based on the variables you have selected. This validation is only offered as a suggestion for you. PLEASE NOTE THAT THE VALIDATION TEXT WILL BE REMOVED WHEN YOU FINALIZE YOUR NOTE. IF YOU WANT TO FAX A PRELIMINARY NOTE YOU WILL NEED TO TOGGLE THIS TO 'NO' IF YOU DO NOT WANT IT IN YOUR FAXED NOTE. Lab Facility: [id number] Double Z Plasty Text: The lesion was extirpated to the level of the fat with a #15 scalpel blade. Given the location of the defect, shape of the defect and the proximity to free margins a double Z-plasty was deemed most appropriate for repair. Using a sterile surgical marker, the appropriate transposition arms of the double Z-plasty were drawn incorporating the defect and placing the expected incisions within the relaxed skin tension lines where possible. The area thus outlined was incised deep to adipose tissue with a #15 scalpel blade. The skin margins were undermined to an appropriate distance in all directions utilizing iris scissors. The opposing transposition arms were then transposed and carried over into place in opposite direction and anchored with interrupted buried subcutaneous sutures. Bilateral Rotation Flap Text: The defect edges were debeveled with a #15 scalpel blade. Given the location of the defect, shape of the defect and the proximity to free margins a bilateral rotation flap was deemed most appropriate. Using a sterile surgical marker, an appropriate rotation flap was drawn incorporating the defect and placing the expected incisions within the relaxed skin tension lines where possible. The area thus outlined was incised deep to adipose tissue with a #15 scalpel blade. The skin margins were undermined to an appropriate distance in all directions utilizing iris scissors. Following this, the designed flap was carried over into the primary defect and sutured into place.

## 2023-05-19 NOTE — ED PROVIDER NOTE - CLINICAL SUMMARY MEDICAL DECISION MAKING FREE TEXT BOX
63 year old male p/w dizziness, HTN, headache, left facial numbness.  Had similar sx 2 days ago that resolved.  Currently, no facial numbness.  NIH 0.  Stroke code called.  Check labs, CT head, CTA head/neck, EKG, consult telestroke and consider transfer

## 2023-05-19 NOTE — H&P ADULT - PROBLEM SELECTOR PLAN 1
r/o CVA, facial numbness on & off, currently asymptomatic, NIHSS of zero, admitted to telemetry with TIA/ CVA protocol, neuro checks, passed dysphagia screening at the bedside & started on diet, continue on low dose Aspirin after one full dose by ED team, also continue on Plavix 75 mg daily after a loading dose by ED team as recommended by tele neurologist who was consulted by ED team, IVF hydration, I & O monitoring, CT brain without contrast was negative, CTA head & neck also negative, TTE in am, TSH & fasting lipid profile, glycohemoglobin in am, possible MRI brain in am, neurology consult as per weekend hospital policy.

## 2023-05-19 NOTE — H&P ADULT - NSHPPHYSICALEXAM_GEN_ALL_CORE
-    Vital Signs Last 24 Hrs  T(C): 36.4 (20 May 2023 00:00), Max: 37 (19 May 2023 21:14)  T(F): 97.6 (20 May 2023 00:00), Max: 98.6 (19 May 2023 21:14)  HR: 68 (20 May 2023 00:00) (68 - 100)  BP: 135/81 (20 May 2023 00:00) (135/78 - 162/82)  BP(mean): --  RR: 18 (20 May 2023 00:00) (18 - 20)  SpO2: 97% (20 May 2023 00:00) (97% - 99%)    Parameters below as of 19 May 2023 21:14  Patient On (Oxygen Delivery Method): room air          PHYSICAL EXAM:  		  GENERAL: NAD, well-groomed, well-developed.  HEAD:  Atraumatic, Norm cephalic.  EYES: PERRLA, conjunctiva clear.  ENMT: no nasal discharge, MMM.   NECK: Supple, No JVD.  NERVOUS SYSTEM:  Alert & oriented X3, neurologically intact, muscle power 5/5 both upper & lower extremities, no sensory loss, normal speech, no ataxia, no cranial nerve affection, negative B/L EPR..  CHEST/LUNG: Good air entry B/L, (+) bibasilar coarse insp rales, no rhonchi, or wheezing.  HEART: Normal S1 & acc A2, no murmurs, or extra sounds.  ABDOMEN: Soft, non-tender, non-distended; bowel sounds present, no palpable masses or organomegaly.  EXTREMITIES:  No clubbing, cyanosis, or edema.  VASCULAR: 2+ radial, DPA / PTA pulses B/L.  SKIN: No rashes or lesions.  PSYCH: normal affect & behavior.

## 2023-05-19 NOTE — ED PROVIDER NOTE - DIFFERENTIAL DIAGNOSIS
Ddx includes but is not limited to CVA, ICH, bell's palsy, vertigo, complex migraine, TIA Differential Diagnosis

## 2023-05-19 NOTE — ED ADULT NURSE NOTE - NSFALLUNIVINTERV_ED_ALL_ED
Bed/Stretcher in lowest position, wheels locked, appropriate side rails in place/Call bell, personal items and telephone in reach/Instruct patient to call for assistance before getting out of bed/chair/stretcher/Non-slip footwear applied when patient is off stretcher/[place] to call system/Physically safe environment - no spills, clutter or unnecessary equipment/Purposeful proactive rounding/Room/bathroom lighting operational, light cord in reach

## 2023-05-19 NOTE — ED ADULT NURSE NOTE - BREATH SOUNDS, LEFT
You can access the FollowMyHealth Patient Portal offered by [facility] by registering at the following website: [email or web address]. By joining [facility] FollowMyHealth portal, you will also be able to view your health information using other applications (apps) compatible with our system.
clear

## 2023-05-19 NOTE — ED ADULT NURSE REASSESSMENT NOTE - NS ED NURSE REASSESS COMMENT FT1
Phone Number Called: [phone] (home)       Call outcome: Spoke to patient regarding message below.    Message: Spoke with mom and let her know we have pt's immunization records printed out and ready for her to . Mom said she will stop by tomorrow 3/7/23 and pick it up.   
VOICEMAIL  1. Caller Name: Ana María (mom)                        Call Back Number: [phone] (home)       2. Message: Mom called asking for a copy of daughter's immunization records.    3. Patient approves office to leave a detailed voicemail/MyChart message: N\A  
pt denies any numbness or headache now. stated that she is feeling much better, awaiting for test results.  at bedside, updated on plan of care

## 2023-05-19 NOTE — H&P ADULT - PROBLEM SELECTOR PLAN 2
controlled, hold Metformin for 48 hours as she received IV contrast, Patient &  both understand, started her on corrective insulin Lispro scale coverage before meals & at bedtime, check glycohemoglobin level in am.

## 2023-05-19 NOTE — H&P ADULT - NSHPLANGTRANSLATORFT_GEN_A_CORE
Translation by  as per her request (speaks little english) Translation by  at the bedside as per her request (speaks little english)

## 2023-05-19 NOTE — ED ADULT TRIAGE NOTE - CHIEF COMPLAINT QUOTE
c/o left face numbness "started 2 days ago. went away, then came back 5 p.m. today". Seen by PCP 2 days ago and told BP high. hx HTN. c/o left face numbness "started 2 days ago. went away, then came back 5 p.m. today". Seen by PCP 2 days ago and told BP high. hx HTN. Also c/o head "heaviness".

## 2023-05-19 NOTE — ED ADULT NURSE NOTE - CHIEF COMPLAINT QUOTE
c/o left face numbness "started 2 days ago. went away, then came back 5 p.m. today". Seen by PCP 2 days ago and told BP high. hx HTN. Also c/o head "heaviness".

## 2023-05-19 NOTE — H&P ADULT - ASSESSMENT
62 y/o F with PMH of HTN, DM 2, Dyslipidemia,  Hypothyroidism, Asthma, Nephrolithiasis s/p lithotomy, and GERD presented with left lower face numbness.

## 2023-05-19 NOTE — H&P ADULT - NSHPLABSRESULTS_GEN_ALL_CORE
-                   13.0   9.40   )----------(  308       ( 19 May 2023 20:04 )               39.1        138    |  101    |  12     ----------------------------<  159        ( 19 May 2023 20:04 )  3.7     |  24     |  0.70     Ca    10.6       ( 19 May 2023 20:04 )    TPro  8.4    /  Alb  4.4    /  TBili  0.3    /  DBili  x      /  AST  23     /  ALT  28     /  AlkPhos  83     ( 19 May 2023 20:04 )    LIVER FUNCTIONS - ( 19 May 2023 20:04 )  Alb: 4.4 g/dL / Pro: 8.4 g/dL / ALK PHOS: 83 U/L / ALT: 28 U/L DA / AST: 23 U/L / GGT: x           PT/INR -  11.1 sec / 0.97 ratio   ( 19 May 2023 20:04 )  PTT -  29.5 sec   ( 19 May 2023 20:04 ) -                   13.0   9.40   )----------(  308       ( 19 May 2023 20:04 )               39.1        138    |  101    |  12     ----------------------------<  159        ( 19 May 2023 20:04 )  3.7     |  24     |  0.70     Ca    10.6       ( 19 May 2023 20:04 )    TPro  8.4    /  Alb  4.4    /  TBili  0.3    /  DBili  x      /  AST  23     /  ALT  28     /  AlkPhos  83     ( 19 May 2023 20:04 )    LIVER FUNCTIONS - ( 19 May 2023 20:04 )  Alb: 4.4 g/dL / Pro: 8.4 g/dL / ALK PHOS: 83 U/L / ALT: 28 U/L DA / AST: 23 U/L / GGT: x           PT/INR -  11.1 sec / 0.97 ratio   ( 19 May 2023 20:04 )  PTT -  29.5 sec   ( 19 May 2023 20:04 )    Troponin I, High Sensitivity (05.19.23 @ 20:04)   Troponin I, High Sensitivity Result: 4.1        CT BRAIN STROKE PROTOCOL     PROCEDURE DATE:  05/19/2023    INTERPRETATION:  CLINICAL INDICATION: Stroke Code. Left facial numbness.  TECHNIQUE: CT of the head was performed without the administration of intravenous contrast.  COMPARISON: CT head 7/15/2016.  FINDINGS:  No acute transcortical infarction or acute intracranial hemorrhage.  No hydrocephalus. No extra-axial fluid collections.  The visualized intraorbital contents are unremarkable. Scattered mucosal thickening throughout the paranasal sinuses. The mastoid air cells are clear.   The visualized soft tissues and osseous structures appear normal.  IMPRESSION:  -No acute transcortical infarct or intracranial hemorrhage.        CT ANGIO NECK STROKE PROTOCOL IC & CT ANGIO BRAIN STROKE PROTOCOL IC     PROCEDURE DATE:  05/19/2023    COMPARISON: None available.  IMPRESSION:  CT angiogram head:  -No vaso-occlusive disease.  CT angiogram neck:  -No vaso-occlusive disease.  Other:  -Mildly prominent pituitary gland. Underlying cyst or adenoma cannot be   ruled out. Recommend nonemergent MRI pituitary for further evaluation.  -Mild fullness of oropharyngeal and hypopharyngeal soft tissues, likely   related to underdistention, but superimposed infection or neoplasm cannot   be entirely ruled out. Recommend ENT evaluation.            CXR:    As per my review shows normal cardiac shadow size, clear lung fields B/L, no pulmonary infiltrates, pleural effusion, or pneumothorax. Pending official report.           EKG:    As per my review shows ST at 105/min, normal ME & QTc intervals, possible old anterior infarct, low QRS voltage, normal duration & axis (- 15), with normal transition, no ST-T abnormality.    -

## 2023-05-19 NOTE — ED PROVIDER NOTE - NSICDXPASTMEDICALHX_GEN_ALL_CORE_FT
PAST MEDICAL HISTORY:  Asthma     Calculus of kidney     DM (diabetes mellitus)     GERD (gastroesophageal reflux disease)     Herpes simplex     HLD (hyperlipidemia)     Hydronephrosis right    Hydronephrosis, unspecified hydronephrosis type     Hypertension     Impaired fasting glucose     Thyroid activity decreased

## 2023-05-19 NOTE — ED ADULT NURSE NOTE - OBJECTIVE STATEMENT
Pt p/w left sided face numbness started 2days ago, went away and came back today again at 5pm, report head is heavy, was told by her pcp that she has HTN

## 2023-05-19 NOTE — H&P ADULT - NSHPREVIEWOFSYSTEMS_GEN_ALL_CORE
-    CONSTITUTIONAL: No fever or chills.  EYES: No eye pain, current visual disturbances, or discharge.  ENMT:  No difficulty hearing, vertigo, sinus or throat pain.  NECK: No pain or stiffness.	  RESPIRATORY: No cough, wheezing, or hemoptysis; No shortness of breath.  CARDIOVASCULAR: No chest pain, palpitations, current dizziness, or leg swelling.  GASTROINTESTINAL: No abdominal pain, no nausea, vomiting, or hematemesis; No diarrhea or Change in bowel habits. No melena or hematochezia.  GENITOURINARY: No dysuria, frequency, hematuria, or incontinence.  NEUROLOGICAL: No headaches, focal muscle weakness, current numbness, or tremors.  SKIN: No itching, burning or rashes.  MUSCULOSKELETAL: No joint swelling or pain.  PSYCHIATRIC: No depression, anxiety, or agitation.  HEME/LYMPH: No easy bruising, bleeding gums, or nose bleed.  ALLERGY AND IMMUNOLOGIC: No hives or eczema.

## 2023-05-20 DIAGNOSIS — Z29.9 ENCOUNTER FOR PROPHYLACTIC MEASURES, UNSPECIFIED: ICD-10-CM

## 2023-05-20 DIAGNOSIS — E03.9 HYPOTHYROIDISM, UNSPECIFIED: ICD-10-CM

## 2023-05-20 DIAGNOSIS — I10 ESSENTIAL (PRIMARY) HYPERTENSION: ICD-10-CM

## 2023-05-20 DIAGNOSIS — E11.9 TYPE 2 DIABETES MELLITUS WITHOUT COMPLICATIONS: ICD-10-CM

## 2023-05-20 DIAGNOSIS — G45.9 TRANSIENT CEREBRAL ISCHEMIC ATTACK, UNSPECIFIED: ICD-10-CM

## 2023-05-20 LAB
ANION GAP SERPL CALC-SCNC: 11 MMOL/L — SIGNIFICANT CHANGE UP (ref 5–17)
BASOPHILS # BLD AUTO: 0.05 K/UL — SIGNIFICANT CHANGE UP (ref 0–0.2)
BASOPHILS NFR BLD AUTO: 0.5 % — SIGNIFICANT CHANGE UP (ref 0–2)
BUN SERPL-MCNC: 7 MG/DL — SIGNIFICANT CHANGE UP (ref 7–23)
CALCIUM SERPL-MCNC: 8.9 MG/DL — SIGNIFICANT CHANGE UP (ref 8.4–10.5)
CHLORIDE SERPL-SCNC: 105 MMOL/L — SIGNIFICANT CHANGE UP (ref 96–108)
CHOLEST SERPL-MCNC: 171 MG/DL — SIGNIFICANT CHANGE UP
CO2 SERPL-SCNC: 26 MMOL/L — SIGNIFICANT CHANGE UP (ref 22–31)
CREAT SERPL-MCNC: 0.47 MG/DL — LOW (ref 0.5–1.3)
EGFR: 106 ML/MIN/1.73M2 — SIGNIFICANT CHANGE UP
EOSINOPHIL # BLD AUTO: 0.54 K/UL — HIGH (ref 0–0.5)
EOSINOPHIL NFR BLD AUTO: 5.6 % — SIGNIFICANT CHANGE UP (ref 0–6)
GLUCOSE SERPL-MCNC: 121 MG/DL — HIGH (ref 70–99)
HCT VFR BLD CALC: 37.6 % — SIGNIFICANT CHANGE UP (ref 34.5–45)
HCV AB S/CO SERPL IA: 0.12 S/CO — SIGNIFICANT CHANGE UP (ref 0–0.99)
HCV AB SERPL-IMP: SIGNIFICANT CHANGE UP
HDLC SERPL-MCNC: 35 MG/DL — LOW
HGB BLD-MCNC: 12.4 G/DL — SIGNIFICANT CHANGE UP (ref 11.5–15.5)
IMM GRANULOCYTES NFR BLD AUTO: 0.3 % — SIGNIFICANT CHANGE UP (ref 0–0.9)
LIPID PNL WITH DIRECT LDL SERPL: 86 MG/DL — SIGNIFICANT CHANGE UP
LYMPHOCYTES # BLD AUTO: 3.51 K/UL — HIGH (ref 1–3.3)
LYMPHOCYTES # BLD AUTO: 36.5 % — SIGNIFICANT CHANGE UP (ref 13–44)
MCHC RBC-ENTMCNC: 29 PG — SIGNIFICANT CHANGE UP (ref 27–34)
MCHC RBC-ENTMCNC: 33 GM/DL — SIGNIFICANT CHANGE UP (ref 32–36)
MCV RBC AUTO: 87.9 FL — SIGNIFICANT CHANGE UP (ref 80–100)
MONOCYTES # BLD AUTO: 0.89 K/UL — SIGNIFICANT CHANGE UP (ref 0–0.9)
MONOCYTES NFR BLD AUTO: 9.3 % — SIGNIFICANT CHANGE UP (ref 2–14)
NEUTROPHILS # BLD AUTO: 4.6 K/UL — SIGNIFICANT CHANGE UP (ref 1.8–7.4)
NEUTROPHILS NFR BLD AUTO: 47.8 % — SIGNIFICANT CHANGE UP (ref 43–77)
NON HDL CHOLESTEROL: 136 MG/DL — HIGH
NRBC # BLD: 0 /100 WBCS — SIGNIFICANT CHANGE UP (ref 0–0)
PLATELET # BLD AUTO: 286 K/UL — SIGNIFICANT CHANGE UP (ref 150–400)
POTASSIUM SERPL-MCNC: 3.8 MMOL/L — SIGNIFICANT CHANGE UP (ref 3.5–5.3)
POTASSIUM SERPL-SCNC: 3.8 MMOL/L — SIGNIFICANT CHANGE UP (ref 3.5–5.3)
RBC # BLD: 4.28 M/UL — SIGNIFICANT CHANGE UP (ref 3.8–5.2)
RBC # FLD: 13.5 % — SIGNIFICANT CHANGE UP (ref 10.3–14.5)
SODIUM SERPL-SCNC: 142 MMOL/L — SIGNIFICANT CHANGE UP (ref 135–145)
TRIGL SERPL-MCNC: 252 MG/DL — HIGH
TSH SERPL-MCNC: 4.98 UIU/ML — HIGH (ref 0.27–4.2)
WBC # BLD: 9.62 K/UL — SIGNIFICANT CHANGE UP (ref 3.8–10.5)
WBC # FLD AUTO: 9.62 K/UL — SIGNIFICANT CHANGE UP (ref 3.8–10.5)

## 2023-05-20 PROCEDURE — 99233 SBSQ HOSP IP/OBS HIGH 50: CPT

## 2023-05-20 RX ORDER — LEVOTHYROXINE SODIUM 125 MCG
25 TABLET ORAL DAILY
Refills: 0 | Status: DISCONTINUED | OUTPATIENT
Start: 2023-05-20 | End: 2023-05-23

## 2023-05-20 RX ORDER — AMLODIPINE BESYLATE 2.5 MG/1
5 TABLET ORAL DAILY
Refills: 0 | Status: DISCONTINUED | OUTPATIENT
Start: 2023-05-20 | End: 2023-05-23

## 2023-05-20 RX ORDER — METOPROLOL TARTRATE 50 MG
25 TABLET ORAL DAILY
Refills: 0 | Status: DISCONTINUED | OUTPATIENT
Start: 2023-05-20 | End: 2023-05-23

## 2023-05-20 RX ORDER — INSULIN LISPRO 100/ML
VIAL (ML) SUBCUTANEOUS
Refills: 0 | Status: DISCONTINUED | OUTPATIENT
Start: 2023-05-20 | End: 2023-05-23

## 2023-05-20 RX ORDER — DEXTROSE 50 % IN WATER 50 %
15 SYRINGE (ML) INTRAVENOUS ONCE
Refills: 0 | Status: DISCONTINUED | OUTPATIENT
Start: 2023-05-20 | End: 2023-05-23

## 2023-05-20 RX ORDER — DEXTROSE 50 % IN WATER 50 %
12.5 SYRINGE (ML) INTRAVENOUS ONCE
Refills: 0 | Status: DISCONTINUED | OUTPATIENT
Start: 2023-05-20 | End: 2023-05-23

## 2023-05-20 RX ORDER — DEXTROSE 50 % IN WATER 50 %
25 SYRINGE (ML) INTRAVENOUS ONCE
Refills: 0 | Status: DISCONTINUED | OUTPATIENT
Start: 2023-05-20 | End: 2023-05-23

## 2023-05-20 RX ORDER — SODIUM CHLORIDE 9 MG/ML
1000 INJECTION, SOLUTION INTRAVENOUS
Refills: 0 | Status: DISCONTINUED | OUTPATIENT
Start: 2023-05-20 | End: 2023-05-23

## 2023-05-20 RX ORDER — GLUCAGON INJECTION, SOLUTION 0.5 MG/.1ML
1 INJECTION, SOLUTION SUBCUTANEOUS ONCE
Refills: 0 | Status: DISCONTINUED | OUTPATIENT
Start: 2023-05-20 | End: 2023-05-23

## 2023-05-20 RX ORDER — INSULIN LISPRO 100/ML
VIAL (ML) SUBCUTANEOUS AT BEDTIME
Refills: 0 | Status: DISCONTINUED | OUTPATIENT
Start: 2023-05-20 | End: 2023-05-23

## 2023-05-20 RX ORDER — HEPARIN SODIUM 5000 [USP'U]/ML
5000 INJECTION INTRAVENOUS; SUBCUTANEOUS EVERY 8 HOURS
Refills: 0 | Status: DISCONTINUED | OUTPATIENT
Start: 2023-05-20 | End: 2023-05-23

## 2023-05-20 RX ADMIN — Medication 81 MILLIGRAM(S): at 12:53

## 2023-05-20 RX ADMIN — Medication 25 MILLIGRAM(S): at 06:04

## 2023-05-20 RX ADMIN — SODIUM CHLORIDE 125 MILLILITER(S): 9 INJECTION INTRAMUSCULAR; INTRAVENOUS; SUBCUTANEOUS at 08:14

## 2023-05-20 RX ADMIN — Medication 2: at 08:14

## 2023-05-20 RX ADMIN — AMLODIPINE BESYLATE 5 MILLIGRAM(S): 2.5 TABLET ORAL at 06:04

## 2023-05-20 RX ADMIN — CLOPIDOGREL BISULFATE 75 MILLIGRAM(S): 75 TABLET, FILM COATED ORAL at 12:53

## 2023-05-20 RX ADMIN — HEPARIN SODIUM 5000 UNIT(S): 5000 INJECTION INTRAVENOUS; SUBCUTANEOUS at 06:04

## 2023-05-20 RX ADMIN — Medication 25 MICROGRAM(S): at 05:07

## 2023-05-20 RX ADMIN — HEPARIN SODIUM 5000 UNIT(S): 5000 INJECTION INTRAVENOUS; SUBCUTANEOUS at 14:12

## 2023-05-20 RX ADMIN — HEPARIN SODIUM 5000 UNIT(S): 5000 INJECTION INTRAVENOUS; SUBCUTANEOUS at 21:39

## 2023-05-20 RX ADMIN — SODIUM CHLORIDE 125 MILLILITER(S): 9 INJECTION INTRAMUSCULAR; INTRAVENOUS; SUBCUTANEOUS at 00:32

## 2023-05-20 NOTE — CARE COORDINATION ASSESSMENT. - NSDCPLANSERVICES_GEN_ALL_CORE
This CM met with the pt and the  at the bedside. Introduced myself as the CM explained my role and left contact information. The pt and  verbalized understanding. The pt speaks [place] as well as English and gave permission for me to speak to the  as well for the assessment. The pt lives in a private home with her  son daughter in law and 2 grandsons. The pt is independent and ambulates without any devices. The pt does not have any services or PT in the home. The pt helps take care of the grandchildren. The PCP is Dr. Sparks and the pharmacy is [facility] in [place]. Pt is for an MRI at [facility] once scheduled. Pt was seen by PT and has no skilled PT needs. No needs anticipated. CM team to remain available for post acute needs. The  is very supportive and will transport the pt home once medically cleared./No Anticipated Discharge Needs

## 2023-05-20 NOTE — CONSULT NOTE ADULT - ASSESSMENT
62 y/o F with PMH of HTN, DM 2, Dyslipidemia,  Hypothyroidism, Asthma, Nephrolithiasis s/p lithotomy, and GERD who presented with left lower face numbnes  Symptoms resolved.  D/D  Hypertensive urgency  TIA  CVA   NIHSS - 0  CT Head - No acute pathology.  CTA head and neck - No LVOs.  Admitted to monitored bed.  Ecotrin 81/Plavix 75  Lipitor 40  MRI Brain/2D Echo.  Pituitary adenoma  -W/up as an out pt.   Lipid panel/HbA1c.  Passed dysphagia eval in ED.  PT Eval/Speech and swallowing eval.  D/w   at bedside. Questions answered.  Would follow.     62 y/o F with PMH of HTN, DM 2, Dyslipidemia,  Hypothyroidism, Asthma, Nephrolithiasis s/p lithotomy, and GERD who presented with left lower face numbnes  Symptoms resolved.  D/D  Hypertensive urgency  TIA  CVA   NIHSS - 0  CT Head - No acute pathology.  CTA head and neck - No LVOs.  Admitted to monitored bed.  Ecotrin 81/Plavix 75  Lipitor 40  MRI Brain /Pituitary pre/post with gado  2D Echo.  Lipid panel/HbA1c.  Passed dysphagia eval in ED.  PT Eval/Speech and swallowing eval.  D/w Pt/  at bedside. Questions answered.  D/w Dr. Young  Would follow.

## 2023-05-20 NOTE — OCCUPATIONAL THERAPY INITIAL EVALUATION ADULT - PERTINENT HX OF CURRENT PROBLEM, REHAB EVAL
This is a 62 y/o F with PMH of HTN, DM 2, Dyslipidemia,  Hypothyroidism, Asthma, Nephrolithiasis s/p lithotomy, and GERD who presented with left lower face numbness started around 2 pm today was on for about 10 minutes, then resolved & was replaced by some "pressure" sensation involving the left half of the head including left eye & left side of the face, believes she was light headed with blurry vision, denies any focal muscle weakness or numbness anywhere else in her body, no associated speech or gait abnormality. Patient checked her blood pressure several times, and believes it went up when she got the face numbness, 's, then back to normal when she took an extra dose of Amlodipine 5 mg. Of note that she got a similar episode3 days ago, and about 4 years ago. Currently patient denies having any symptoms.

## 2023-05-20 NOTE — OCCUPATIONAL THERAPY INITIAL EVALUATION ADULT - LEVEL OF INDEPENDENCE: TOILET, REHAB EVAL
Patient is a 66y old  Female who presents with a chief complaint of weakness (2022 19:46)    HPI:  65 yo F with PMH HTN, HLD, mild GEORGINA, pre-diabetes, asthma, vitamin D deficiency presents to the ED with weakness and lightheadedness. Pt says that for the past week and a half she has been feeling very weak. Has been sleeping for about 15 hours per day which is very unusual for her. This morning she woke up with persistent weakness, lethargy and felt very lightheaded. She said she felt off-balanced which lasted for a few minutes so she came in to the ER for further evaluation. Of note, pt recently came from [facility] about 2-3 weeks ago. Pt was recently treated on 3/24 for a PNA with Augmentin 875mg x7 days and Zpack x5 days by Pulm Dr. Roque. Pt's CXR was negative but sputum cx was positive for H. Influenza. Her respiratory symptoms have resolved, no longer has cough or shortness of breath. Other symptoms she reports is b/l ear pain. Saw an ENT dr last week for it who performed hearing tests which were WNL. Prescribed Flonase which significantly relieves those symptoms. She reports diarrhea since starting her abx, last episode of diarrhea was last night she reports. Typically has 1-2 loose stools per day. She admits to decrease PO, loss of appetite and a 6 lb weight loss in a week and a half. Otherwise, she denies vision changes, hearing changes, sore throat, nasal congestion, chest pain, dyspnea, abdominal pain, nausea or vomiting. Denies focal weakness, numbness or tingling. Denies night sweats or fevers.       ED Course:   Vitals: BP: 121/82, HR: 105-->97, Temp: 99 F, RR: 17, SpO2: 98% on RA  Labs: no significant findings, d-dimer negative  RVP negative   UA: negative  CXR: no acute lung pathology   CT Head: no acute findings  MRI Brain: There is no mass, intracranial hemorrhage, or acute infarction.  MRA Head/Neck: No substantial intracranial arterial stenosis or large vessel occlusion. No hemodynamically significant arterial stenosis in the neck.  EKG: NSR, Hr: 93, no signs of acute ischemia   Received Meclizine 25 mg x1 and 1L NS bolus x1 in the ED  (2022 16:46)    INTERVAL HPI:  22: Admitted for generalized weakness, dizziness, weight loss, diarrhea. CT head, MRI head, and MRA head/neck unrevealing. Pt was tachycardic - D-dimer was negative. CTA chest prelim read was negative for PE but did reveal age-indeterminate 1.7 x 0.8cm focal outpouching from distal descending thoracic aorta compatible with penetrating ulcer/pseudoaneurysm. Official radiology report pending. EBV testing consistent with past infection. Other workup unrevealing for life-threatening cause of symptoms.   22: Pt seen and examined at bedside this morning. Pt reports her dizziness is improved. She says she had a headache earlier this morning that resolved with tylenol. Denies chest pain, fevers, chills, abd pain, nausea, vomiting. Had BM this morning.     OVERNIGHT EVENTS: NONE    Home Medications:  albuterol 90 mcg/inh inhalation aerosol: 1-2 puff(s) inhaled every 6 hours, As Needed (2022 10:38)  Flonase 50 mcg/inh nasal spray: 2 spray(s) in each affected nostril once a day (2022 15:58)  Lipitor 20 mg oral tablet: 1 tab(s) orally once a day (2022 10:38)  Norvasc 2.5 mg oral tablet: 1 tab(s) orally once a day (2022 10:38)  Symbicort 160 mcg-4.5 mcg/inh inhalation aerosol: 2 puff(s) inhaled 2 times a day (2022 10:38)      MEDICATIONS  (STANDING):  amLODIPine   Tablet 2.5 milliGRAM(s) Oral daily  atorvastatin 20 milliGRAM(s) Oral at bedtime  budesonide 160 MICROgram(s)/formoterol 4.5 MICROgram(s) Inhaler 2 Puff(s) Inhalation two times a day  enoxaparin Injectable 40 milliGRAM(s) SubCutaneous every 24 hours  fluticasone propionate 50 MICROgram(s)/spray Nasal Spray 2 Spray(s) Both Nostrils <User Schedule>  sodium chloride 0.9%. 1000 milliLiter(s) (75 mL/Hr) IV Continuous <Continuous>    MEDICATIONS  (PRN):  acetaminophen     Tablet .. 650 milliGRAM(s) Oral every 6 hours PRN Temp greater or equal to 38C (100.4F), Mild Pain (1 - 3)  ALBUTerol    90 MICROgram(s) HFA Inhaler 2 Puff(s) Inhalation every 6 hours PRN Shortness of Breath and/or Wheezing  aluminum hydroxide/magnesium hydroxide/simethicone Suspension 30 milliLiter(s) Oral every 4 hours PRN Dyspepsia  melatonin 3 milliGRAM(s) Oral at bedtime PRN Insomnia  ondansetron Injectable 4 milliGRAM(s) IV Push every 8 hours PRN Nausea and/or Vomiting      Allergies    No Known Allergies    Intolerances        Social History:  Tobacco: denies   EtOH: denies   Recreational drug use: denies  Lives with: son   Ambulates: independently   ADLs: independent   Occupation: Used to work as a  in a nursing home, retired in 2017, 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 No biomass exposure (2022 16:46)      REVIEW OF SYSTEMS:  CONSTITUTIONAL: No fever, No chills, No fatigue, No myalgia, No Body ache, No Weakness  EYES: No eye pain,  No visual disturbances, No discharge, NO Redness  ENMT:  No ear pain, No nose bleed, No vertigo; No sinus pain, NO throat pain, No Congestion  NECK: No pain, No stiffness  RESPIRATORY: No cough, NO wheezing, No  hemoptysis, NO  shortness of breath  CARDIOVASCULAR: No chest pain, palpitations  GASTROINTESTINAL: No abdominal pain, NO epigastric pain. No nausea, No vomiting; No diarrhea, No constipation. [ x ] BM  GENITOURINARY: No dysuria, No frequency, No urgency, No hematuria, NO incontinence  NEUROLOGICAL: No headaches, No dizziness, No numbness, No tingling, No tremors, No weakness  EXT: No Swelling, No Pain, No Edema  SKIN:  [ x ] No itching, burning, rashes, or lesions   MUSCULOSKELETAL: No joint pain ,No Jt swelling; No muscle pain, No back pain, No extremity pain  PSYCHIATRIC: No depression,  No anxiety,  No mood swings ,No difficulty sleeping at night  PAIN SCALE: [ x ] None  [  ] Other-  ROS Unable to obtain due to - [  ] Dementia  [  ] Lethargy [  ] Drowsy [  ] Sedated [  ] non verbal  REST OF REVIEW Of SYSTEM - [ x ] Normal     Vital Signs Last 24 Hrs  T(C): 36.7 (2022 04:28), Max: 37.2 (2022 10:25)  T(F): 98 (2022 04:28), Max: 99 (2022 10:25)  HR: 78 (2022 04:28) (78 - 105)  BP: 103/67 (2022 04:28) (103/67 - 133/76)  BP(mean): --  RR: 16 (2022 04:28) (16 - 17)  SpO2: 96% (2022 04:28) (94% - 98%)  Finger Stick        - @ 07:01  -  - @ 07:00  --------------------------------------------------------  IN: 975 mL / OUT: 0 mL / NET: 975 mL        PHYSICAL EXAM:  GENERAL:  [ x ] NAD , [ x ] well appearing, [  ] Agitated, [  ] Drowsy,  [  ] Lethargy, [  ] confused   HEAD:  [  ] Normal, [  ] Other  EYES:  [ x ] EOMI, [ x ] PERRLA, [ x ] conjunctiva and sclera clear normal, [  ] Other,  [  ] Pallor,[  ] Discharge  ENMT:  [ x ] Normal, [ x ] Moist mucous membranes, [ x ] Good dentition, [ x ] No Thrush  NECK:  [ x ] Supple, [ x ] No JVD, [  ] Normal thyroid, [  ] Lymphadenopathy [  ] Other  CHEST/LUNG:  [ x ] Clear to auscultation bilaterally, [ x ] Breath Sounds equal B/L / Decrease, [  ] poor effort  [ x ] No rales, [ x ] No rhonchi  [ x ]  No wheezing,   HEART:  [ x ] Regular rate and rhythm, [  ] tachycardia, [  ] Bradycardia,  [  ] irregular  [ x ] No murmurs, No rubs, No gallops, [  ] PPM in place (Mfr:  )  ABDOMEN:  [ x ] Soft, [ x ] Nontender, [ x ] Nondistended, [ x ] No mass, [  ] Bowel sounds present, [  ] obese  NERVOUS SYSTEM:  [ x ] Alert & Oriented X3, [ x ] Nonfocal  [  ] Confusion  [  ] Encephalopathic [  ] Sedated [  ] Unable to assess, [  ] Dementia [  ] Other-  EXTREMITIES: [  ] 2+ Peripheral Pulses, No clubbing, No cyanosis,  [  ] small 1+ mild pitting edema B/L lower EXT. [  ] PVD stasis skin changes B/L Lower EXT, [  ] wound  LYMPH: No lymphadenopathy noted  SKIN:  [ x ] No rashes or lesions, [  ] Pressure Ulcers, [  ] ecchymosis, [  ] Skin Tears, [  ] Other    DIET: Diet, Regular:   DASH/TLC Sodium & Cholesterol Restricted  Lacto Veg (Accepts Milk & Milk Products)  No Egg (22 @ 17:13)      LABS:                        12.1   6.65  )-----------( 296      ( 2022 08:44 )             37.1     2022 14:38    135    |  101    |  7      ----------------------------<  109    4.3     |  27     |  0.67     Ca    9.4        2022 14:38    TPro  6.9    /  Alb  3.6    /  TBili  0.4    /  DBili  x      /  AST  8      /  ALT  23     /  AlkPhos  71     2022 14:38      Urinalysis Basic - ( 2022 12:08 )    Color: Pale Yellow / Appearance: Clear / S.005 / pH: x  Gluc: x / Ketone: Negative  / Bili: Negative / Urobili: Negative   Blood: x / Protein: Negative / Nitrite: Negative   Leuk Esterase: Negative / RBC: x / WBC x   Sq Epi: x / Non Sq Epi: x / Bacteria: x        Culture Results:   No Growth Final ( @ 18:40)  Culture Results:   No Growth Final ( @ 18:40)  Culture Results:   <10,000 CFU/mL Normal Urogenital Jessi ( @ 18:27)          CARDIAC MARKERS ( 2022 17:00 )  x     / x     / 38 U/L / x     / x              Culture - Blood (collected 30 Mar 2022 18:40)  Source: .Blood Blood-Peripheral  Final Report (2022 19:00):    No Growth Final    Culture - Blood (collected 30 Mar 2022 18:40)  Source: .Blood Blood-Peripheral  Final Report (2022 19:00):    No Growth Final    Culture - Urine (collected 30 Mar 2022 18:27)  Source: Clean Catch Clean Catch (Midstream)  Final Report (31 Mar 2022 14:11):    <10,000 CFU/mL Normal Urogenital Jessi         Anemia Panel:      Thyroid Panel:  Thyroid Stimulating Hormone, Serum: 0.76 uIU/mL (22 @ 17:00)  Thyroid Stimulating Hormone, Serum: 0.93 uIU/mL (22 @ 14:38)                RADIOLOGY & ADDITIONAL TESTS:    < from: CT Head No Cont (22 @ 11:49) >    ACC: [id number] EXAM:  CT BRAIN                          PROCEDURE DATE:  2022          INTERPRETATION:  Clinical indications: Dizziness    Multiple axial sections were performed from base skull to vertex without   contrast enhancement. Coronaland sagittal destructions were performed as   well    This exam is compared with prior head CT performed on May 28, 2015    Parenchymal volume loss and chronic microvascular ischemic changes again   seen    There is no evidence cute hemorrhage mass or mass effect seen    Evaluation of the osseous structures with the appropriate window appears   normal    The visualized paranasal mastoid and middle ear regions appear clear.    IMPRESSION: Stable exam.    --- End of Report ---            CECELIA TRISTAN MD; Attending Radiologist  This document has been electronically signed. 2022 11:54AM    < end of copied text >  < from: Xray Chest 2 Views PA/Lat (22 @ 12:54) >    ACC: [id number] EXAM:  XR CHEST PA LAT 2V                          PROCEDURE DATE:  2022          INTERPRETATION:  PA and lateral chest on 2022 at 12:42 PM.   Patient recent pneumonia now has dizziness.    Heart size is within normal limits.    The lung fields and pleural surfaces are unremarkable.    Chest is similar to 2022.    IMPRESSION: Negative chest.    --- End of Report ---            WESTLEY BAZAN MD; Attending Radiologist  This document has been electronically signed. 2022  1:29PM    < end of copied text >  < from: MR Head No Cont (22 @ 13:42) >    ACC: [id number] EXAM:  MR ANGIO NECK                        ACC: [id number] EXAM:  MR ANGIO BRAIN                        ACC: [id number] EXAM:  MR BRAIN                          PROCEDURE DATE:  2022          INTERPRETATION:  STUDY: MR BRAIN WITHOUT CONTRAST.  MAGNETIC RESONANCE ANGIOGRAPHY OF THE HEAD AND NECK.      CLINICAL INDICATION: Dizziness. Posterior stroke    TECHNIQUE: Multiplanar, multisequence MR imaging of the brain was   performed. Noncontrast neck MRA was performed. 3-D time-of-flight. 3-D   reconstructions were performed. Carotid stenosis was calculated based on   NASCET criteria. Noncontrast MRA of the head was performed using   time-of-flight technique with 3-D reconstructions.    COMPARISON: CT head from same day    FINDINGS:  MR brain:  The ventricles and sulci are age appropriate . There is no evidence of   mass, intracranial hemorrhage, or acute infarction. There is no extra   axial collection. No midline shift or other significant mass effect is   noted.      There is normal flow-void within major cranial vessels suggestive of   their patency.    The orbital contents are grossly unremarkable. Mild polypoid mucosal   thickening in the maxillary sinuses. The mastoid air cells are   predominantly clear.    MRA HEAD:  Flow is identified in both petrocavernous and supraclinoid segments of   the carotid arteries. Flow is identified within the bilateral anterior   and middle cerebral arteries. Anterior communicating artery is present.   Both posterior communicating arteries are not definitively seen.    The distal vertebral arteries are patent. The basilar artery is patent.   Flow is seen in the posterior cerebral arteries and superior cerebellar   arteries.    There is no evidence of substantial arterial stenosis or occlusion.    There is no evidence of a dominant [place] of Cheng aneurysm or AVM.    MRA NECK:  There is no significant stenosis of the origins of the great vessels from   the aortic arch.    Right carotid: The right common carotid artery shows no significant   stenosis. There is no significant narrowing involving the right carotid   bifurcation. No evidence for any hemodynamically significant stenosis at   the ICA origin by NASCET criteria. The remainder of the right internal   carotid artery to the skull base shows no significant narrowing.    Left carotid: The left common carotid artery shows no significant   stenosis. There is no significant narrowing involving the left carotid   bifurcation. No evidence for any hemodynamically significant stenosis at   the ICA origin by NASCET criteria. The remainder of the left internal   carotid artery to the skull base shows no significant narrowing.    Vertebral arteries: There is flow seen in both cervical vertebral   arteries without evidence of high-grade stenosis.      IMPRESSION:  MR BRAIN:  There is no mass, intracranial hemorrhage, or acute infarction.  MRA head:  No substantial intracranial arterial stenosis or large vessel occlusion.  MRA neck:  No hemodynamically significant arterial stenosis in the neck.    --- End of Report ---            LOGAN TOTH MD; Attending Radiologist  This document has been electronically signed. 2022  3:17PM    < end of copied text >    < from: CT Angio Chest PE Protocol w/ IV Cont (22 @ 22:12) >    ******PRELIMINARY REPORT******      ******PRELIMINARY REPORT******       ACC: [id number] EXAM:  CT ANGIO CHEST PULM ART WAWIC                          PROCEDURE DATE:  2022    ******PRELIMINARY REPORT******      ******PRELIMINARY REPORT******           INTERPRETATION:  No pulmonary embolism. Age indeterminate 1.7 x 0.8 cm   focal outpouching from the distal descending thoracic aorta compatible   with penetrating ulcer/pseudoaneurysm. F/U final report in AM.        ******PRELIMINARY REPORT******      ******PRELIMINARY REPORT******         KEVIN FUENTES MD; Attending Radiologist    < end of copied text >    HEALTH ISSUES - PROBLEM Dx:  Weakness with dizziness    Diarrhea    Tachycardia    Hypertension    Hyperlipidemia    Asthma    Need for prophylactic measure            Consultant(s) Notes Reviewed:  [  ] YES     Care Discussed with [X] Consultants  [  ] Patient  [  ] Family [  ] HCP [  ]   [  ] Social Service  [  ] RN, [  ] Physical Therapy,[  ] Palliative care team  DVT PPX: [  ] Lovenox, [  ] S C Heparin, [  ] Coumadin, [  ] Xarelto, [  ] Eliquis, [  ] Pradaxa, [  ] IV Heparin drip, [  ] SCD [  ] Contraindication 2 to GI Bleed,[  ] Ambulation [  ] Contraindicated 2 to  bleed [  ] Contraindicated 2 to Brain Bleed  Advanced directive: [  ] None, [  ] DNR/DNI Patient is a 66y old  Female who presents with a chief complaint of weakness (2022 19:46)    HPI:  67 yo F with PMH HTN, HLD, mild GEORGINA, pre-diabetes, asthma, vitamin D deficiency presents to the ED with weakness and lightheadedness. Pt says that for the past week and a half she has been feeling very weak. Has been sleeping for about 15 hours per day which is very unusual for her. This morning she woke up with persistent weakness, lethargy and felt very lightheaded. She said she felt off-balanced which lasted for a few minutes so she came in to the ER for further evaluation. Of note, pt recently came from [facility] about 2-3 weeks ago. Pt was recently treated on 3/24 for a PNA with Augmentin 875mg x7 days and Zpack x5 days by Pulm Dr. Roque. Pt's CXR was negative but sputum cx was positive for H. Influenza. Her respiratory symptoms have resolved, no longer has cough or shortness of breath. Other symptoms she reports is b/l ear pain. Saw an ENT dr last week for it who performed hearing tests which were WNL. Prescribed Flonase which significantly relieves those symptoms. She reports diarrhea since starting her abx, last episode of diarrhea was last night she reports. Typically has 1-2 loose stools per day. She admits to decrease PO, loss of appetite and a 6 lb weight loss in a week and a half. Otherwise, she denies vision changes, hearing changes, sore throat, nasal congestion, chest pain, dyspnea, abdominal pain, nausea or vomiting. Denies focal weakness, numbness or tingling. Denies night sweats or fevers.       ED Course:   Vitals: BP: 121/82, HR: 105-->97, Temp: 99 F, RR: 17, SpO2: 98% on RA  Labs: no significant findings, d-dimer negative  RVP negative   UA: negative  CXR: no acute lung pathology   CT Head: no acute findings  MRI Brain: There is no mass, intracranial hemorrhage, or acute infarction.  MRA Head/Neck: No substantial intracranial arterial stenosis or large vessel occlusion. No hemodynamically significant arterial stenosis in the neck.  EKG: NSR, Hr: 93, no signs of acute ischemia   Received Meclizine 25 mg x1 and 1L NS bolus x1 in the ED  (2022 16:46)    INTERVAL HPI:  22: Admitted for generalized weakness, dizziness, weight loss, diarrhea. CT head, MRI head, and MRA head/neck unrevealing. Pt was tachycardic - D-dimer was negative. CTA chest prelim read was negative for PE but did reveal age-indeterminate 1.7 x 0.8cm focal outpouching from distal descending thoracic aorta compatible with penetrating ulcer/pseudoaneurysm. Official radiology report pending. EBV testing consistent with past infection. Other workup unrevealing for life-threatening cause of symptoms.   22: Pt seen and examined at bedside this morning. Pt reports her dizziness is improved. She says she had a headache earlier this morning that resolved with tylenol. Denies chest pain, fevers, chills, abd pain, nausea, vomiting. Had BM this morning.     OVERNIGHT EVENTS: NONE    Home Medications:  albuterol 90 mcg/inh inhalation aerosol: 1-2 puff(s) inhaled every 6 hours, As Needed (2022 10:38)  Flonase 50 mcg/inh nasal spray: 2 spray(s) in each affected nostril once a day (2022 15:58)  Lipitor 20 mg oral tablet: 1 tab(s) orally once a day (2022 10:38)  Norvasc 2.5 mg oral tablet: 1 tab(s) orally once a day (2022 10:38)  Symbicort 160 mcg-4.5 mcg/inh inhalation aerosol: 2 puff(s) inhaled 2 times a day (2022 10:38)      MEDICATIONS  (STANDING):  amLODIPine   Tablet 2.5 milliGRAM(s) Oral daily  atorvastatin 20 milliGRAM(s) Oral at bedtime  budesonide 160 MICROgram(s)/formoterol 4.5 MICROgram(s) Inhaler 2 Puff(s) Inhalation two times a day  enoxaparin Injectable 40 milliGRAM(s) SubCutaneous every 24 hours  fluticasone propionate 50 MICROgram(s)/spray Nasal Spray 2 Spray(s) Both Nostrils <User Schedule>  sodium chloride 0.9%. 1000 milliLiter(s) (75 mL/Hr) IV Continuous <Continuous>    MEDICATIONS  (PRN):  acetaminophen     Tablet .. 650 milliGRAM(s) Oral every 6 hours PRN Temp greater or equal to 38C (100.4F), Mild Pain (1 - 3)  ALBUTerol    90 MICROgram(s) HFA Inhaler 2 Puff(s) Inhalation every 6 hours PRN Shortness of Breath and/or Wheezing  aluminum hydroxide/magnesium hydroxide/simethicone Suspension 30 milliLiter(s) Oral every 4 hours PRN Dyspepsia  melatonin 3 milliGRAM(s) Oral at bedtime PRN Insomnia  ondansetron Injectable 4 milliGRAM(s) IV Push every 8 hours PRN Nausea and/or Vomiting      Allergies    No Known Allergies    Intolerances        Social History:  Tobacco: denies   EtOH: denies   Recreational drug use: denies  Lives with: son   Ambulates: independently   ADLs: independent   Occupation: Used to work as a  in a nursing home, retired in 2017, 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 No biomass exposure (2022 16:46)      REVIEW OF SYSTEMS:  CONSTITUTIONAL: No fever, No chills, No fatigue, No myalgia, No Body ache, No Weakness  EYES: No eye pain,  No visual disturbances, No discharge, NO Redness  ENMT:  No ear pain, No nose bleed, No vertigo; No sinus pain, NO throat pain, No Congestion  NECK: No pain, No stiffness  RESPIRATORY: No cough, NO wheezing, No  hemoptysis, NO  shortness of breath  CARDIOVASCULAR: No chest pain, palpitations  GASTROINTESTINAL: No abdominal pain, NO epigastric pain. No nausea, No vomiting; No diarrhea, No constipation. [ x ] BM  GENITOURINARY: No dysuria, No frequency, No urgency, No hematuria, NO incontinence  NEUROLOGICAL: No headaches, No dizziness, No numbness, No tingling, No tremors, No weakness  EXT: No Swelling, No Pain, No Edema  SKIN:  [ x ] No itching, burning, rashes, or lesions   MUSCULOSKELETAL: No joint pain ,No Jt swelling; No muscle pain, No back pain, No extremity pain  PSYCHIATRIC: No depression,  No anxiety,  No mood swings ,No difficulty sleeping at night  PAIN SCALE: [ x ] None  [  ] Other-  ROS Unable to obtain due to - [  ] Dementia  [  ] Lethargy [  ] Drowsy [  ] Sedated [  ] non verbal  REST OF REVIEW Of SYSTEM - [ x ] Normal     Vital Signs Last 24 Hrs  T(C): 36.7 (2022 04:28), Max: 37.2 (2022 10:25)  T(F): 98 (2022 04:28), Max: 99 (2022 10:25)  HR: 78 (2022 04:28) (78 - 105)  BP: 103/67 (2022 04:28) (103/67 - 133/76)  BP(mean): --  RR: 16 (2022 04:28) (16 - 17)  SpO2: 96% (2022 04:28) (94% - 98%)  Finger Stick        - @ 07:01  -  - @ 07:00  --------------------------------------------------------  IN: 975 mL / OUT: 0 mL / NET: 975 mL        PHYSICAL EXAM:  GENERAL:  [ x ] NAD , [ x ] well appearing, [  ] Agitated, [  ] Drowsy,  [  ] Lethargy, [  ] confused   HEAD:  [  ] Normal, [  ] Other  EYES:  [ x ] EOMI, [ x ] PERRLA, [ x ] conjunctiva and sclera clear normal, [  ] Other,  [  ] Pallor,[  ] Discharge  ENMT:  [ x ] Normal, [ x ] Moist mucous membranes, [ x ] Good dentition, [ x ] No Thrush  NECK:  [ x ] Supple, [ x ] No JVD, [  ] Normal thyroid, [  ] Lymphadenopathy [  ] Other  CHEST/LUNG:  [ x ] Clear to auscultation bilaterally, [ x ] Breath Sounds equal B/L / Decrease, [  ] poor effort  [ x ] No rales, [ x ] No rhonchi  [ x ]  No wheezing,   HEART:  [ x ] Regular rate and rhythm, [  ] tachycardia, [  ] Bradycardia,  [  ] irregular  [ x ] No murmurs, No rubs, No gallops, [  ] PPM in place (Mfr:  )  ABDOMEN:  [ x ] Soft, [ x ] Nontender, [ x ] Nondistended, [ x ] No mass, [  ] Bowel sounds present, [  ] obese  NERVOUS SYSTEM:  [ x ] Alert & Oriented X3, [ x ] Nonfocal  [  ] Confusion  [  ] Encephalopathic [  ] Sedated [  ] Unable to assess, [  ] Dementia [  ] Other-  EXTREMITIES: [  ] 2+ Peripheral Pulses, No clubbing, No cyanosis,  [  ] small 1+ mild pitting edema B/L lower EXT. [  ] PVD stasis skin changes B/L Lower EXT, [  ] wound  LYMPH: No lymphadenopathy noted  SKIN:  [ x ] No rashes or lesions, [  ] Pressure Ulcers, [  ] ecchymosis, [  ] Skin Tears, [  ] Other    DIET: Diet, Regular:   DASH/TLC Sodium & Cholesterol Restricted  Lacto Veg (Accepts Milk & Milk Products)  No Egg (22 @ 17:13)      LABS:                        12.1   6.65  )-----------( 296      ( 2022 08:44 )             37.1     04-05    140  |  106  |  5<L>  ----------------------------<  80  3.8   |  27  |  0.60    Ca    8.4<L>      2022 08:44  Phos  3.1     04-05  Mg     2.4     04-05    TPro  6.3  /  Alb  3.0<L>  /  TBili  0.3  /  DBili  x   /  AST  8<L>  /  ALT  19  /  AlkPhos  66  04-05    Urinalysis Basic - ( 2022 12:08 )    Color: Pale Yellow / Appearance: Clear / S.005 / pH: x  Gluc: x / Ketone: Negative  / Bili: Negative / Urobili: Negative   Blood: x / Protein: Negative / Nitrite: Negative   Leuk Esterase: Negative / RBC: x / WBC x   Sq Epi: x / Non Sq Epi: x / Bacteria: x        Culture Results:   No Growth Final ( @ 18:40)  Culture Results:   No Growth Final ( @ 18:40)  Culture Results:   <10,000 CFU/mL Normal Urogenital Jessi ( @ 18:27)          CARDIAC MARKERS ( 2022 17:00 )  x     / x     / 38 U/L / x     / x              Culture - Blood (collected 30 Mar 2022 18:40)  Source: .Blood Blood-Peripheral  Final Report (2022 19:00):    No Growth Final    Culture - Blood (collected 30 Mar 2022 18:40)  Source: .Blood Blood-Peripheral  Final Report (2022 19:00):    No Growth Final    Culture - Urine (collected 30 Mar 2022 18:27)  Source: Clean Catch Clean Catch (Midstream)  Final Report (31 Mar 2022 14:11):    <10,000 CFU/mL Normal Urogenital Jessi         Anemia Panel:      Thyroid Panel:  Thyroid Stimulating Hormone, Serum: 0.76 uIU/mL (22 @ 17:00)  Thyroid Stimulating Hormone, Serum: 0.93 uIU/mL (22 @ 14:38)                RADIOLOGY & ADDITIONAL TESTS:    < from: CT Head No Cont (22 @ 11:49) >    ACC: [id number] EXAM:  CT BRAIN                          PROCEDURE DATE:  2022          INTERPRETATION:  Clinical indications: Dizziness    Multiple axial sections were performed from base skull to vertex without   contrast enhancement. Coronaland sagittal destructions were performed as   well    This exam is compared with prior head CT performed on May 28, 2015    Parenchymal volume loss and chronic microvascular ischemic changes again   seen    There is no evidence cute hemorrhage mass or mass effect seen    Evaluation of the osseous structures with the appropriate window appears   normal    The visualized paranasal mastoid and middle ear regions appear clear.    IMPRESSION: Stable exam.    --- End of Report ---            CECELIA TRISTAN MD; Attending Radiologist  This document has been electronically signed. 2022 11:54AM    < end of copied text >  < from: Xray Chest 2 Views PA/Lat (22 @ 12:54) >    ACC: [id number] EXAM:  XR CHEST PA LAT 2V                          PROCEDURE DATE:  2022          INTERPRETATION:  PA and lateral chest on 2022 at 12:42 PM.   Patient recent pneumonia now has dizziness.    Heart size is within normal limits.    The lung fields and pleural surfaces are unremarkable.    Chest is similar to 2022.    IMPRESSION: Negative chest.    --- End of Report ---            WESTLEY BAZAN MD; Attending Radiologist  This document has been electronically signed. 2022  1:29PM    < end of copied text >  < from: MR Head No Cont (22 @ 13:42) >    ACC: [id number] EXAM:  MR ANGIO NECK                        ACC: [id number] EXAM:  MR ANGIO BRAIN                        ACC: [id number] EXAM:  MR BRAIN                          PROCEDURE DATE:  2022          INTERPRETATION:  STUDY: MR BRAIN WITHOUT CONTRAST.  MAGNETIC RESONANCE ANGIOGRAPHY OF THE HEAD AND NECK.      CLINICAL INDICATION: Dizziness. Posterior stroke    TECHNIQUE: Multiplanar, multisequence MR imaging of the brain was   performed. Noncontrast neck MRA was performed. 3-D time-of-flight. 3-D   reconstructions were performed. Carotid stenosis was calculated based on   NASCET criteria. Noncontrast MRA of the head was performed using   time-of-flight technique with 3-D reconstructions.    COMPARISON: CT head from same day    FINDINGS:  MR brain:  The ventricles and sulci are age appropriate . There is no evidence of   mass, intracranial hemorrhage, or acute infarction. There is no extra   axial collection. No midline shift or other significant mass effect is   noted.      There is normal flow-void within major cranial vessels suggestive of   their patency.    The orbital contents are grossly unremarkable. Mild polypoid mucosal   thickening in the maxillary sinuses. The mastoid air cells are   predominantly clear.    MRA HEAD:  Flow is identified in both petrocavernous and supraclinoid segments of   the carotid arteries. Flow is identified within the bilateral anterior   and middle cerebral arteries. Anterior communicating artery is present.   Both posterior communicating arteries are not definitively seen.    The distal vertebral arteries are patent. The basilar artery is patent.   Flow is seen in the posterior cerebral arteries and superior cerebellar   arteries.    There is no evidence of substantial arterial stenosis or occlusion.    There is no evidence of a dominant [place] of Cheng aneurysm or AVM.    MRA NECK:  There is no significant stenosis of the origins of the great vessels from   the aortic arch.    Right carotid: The right common carotid artery shows no significant   stenosis. There is no significant narrowing involving the right carotid   bifurcation. No evidence for any hemodynamically significant stenosis at   the ICA origin by NASCET criteria. The remainder of the right internal   carotid artery to the skull base shows no significant narrowing.    Left carotid: The left common carotid artery shows no significant   stenosis. There is no significant narrowing involving the left carotid   bifurcation. No evidence for any hemodynamically significant stenosis at   the ICA origin by NASCET criteria. The remainder of the left internal   carotid artery to the skull base shows no significant narrowing.    Vertebral arteries: There is flow seen in both cervical vertebral   arteries without evidence of high-grade stenosis.      IMPRESSION:  MR BRAIN:  There is no mass, intracranial hemorrhage, or acute infarction.  MRA head:  No substantial intracranial arterial stenosis or large vessel occlusion.  MRA neck:  No hemodynamically significant arterial stenosis in the neck.    --- End of Report ---            LOGAN TOTH MD; Attending Radiologist  This document has been electronically signed. 2022  3:17PM    < end of copied text >    < from: CT Angio Chest PE Protocol w/ IV Cont (22 @ 22:12) >    ******PRELIMINARY REPORT******      ******PRELIMINARY REPORT******       ACC: [id number] EXAM:  CT ANGIO CHEST PULM ART [facility]                          PROCEDURE DATE:  2022    ******PRELIMINARY REPORT******      ******PRELIMINARY REPORT******           INTERPRETATION:  No pulmonary embolism. Age indeterminate 1.7 x 0.8 cm   focal outpouching from the distal descending thoracic aorta compatible   with penetrating ulcer/pseudoaneurysm. F/U final report in AM.        ******PRELIMINARY REPORT******      ******PRELIMINARY REPORT******         KEVIN FUENTES MD; Attending Radiologist    < end of copied text >    HEALTH ISSUES - PROBLEM Dx:  Weakness with dizziness    Diarrhea    Tachycardia    Hypertension    Hyperlipidemia    Asthma    Need for prophylactic measure            Consultant(s) Notes Reviewed:  [  ] YES     Care Discussed with [X] Consultants  [  ] Patient  [  ] Family [  ] HCP [  ]   [  ] Social Service  [  ] RN, [  ] Physical Therapy,[  ] Palliative care team  DVT PPX: [  ] Lovenox, [  ] S C Heparin, [  ] Coumadin, [  ] Xarelto, [  ] Eliquis, [  ] Pradaxa, [  ] IV Heparin drip, [  ] SCD [  ] Contraindication 2 to GI Bleed,[  ] Ambulation [  ] Contraindicated 2 to  bleed [  ] Contraindicated 2 to Brain Bleed  Advanced directive: [  ] None, [  ] DNR/DNI Patient is a 66y old  Female who presents with a chief complaint of weakness (2022 19:46)    HPI:  67 yo F with PMH HTN, HLD, mild GEORGINA, pre-diabetes, asthma, vitamin D deficiency presents to the ED with weakness and lightheadedness. Pt says that for the past week and a half she has been feeling very weak. Has been sleeping for about 15 hours per day which is very unusual for her. This morning she woke up with persistent weakness, lethargy and felt very lightheaded. She said she felt off-balanced which lasted for a few minutes so she came in to the ER for further evaluation. Of note, pt recently came from [facility] about 2-3 weeks ago. Pt was recently treated on 3/24 for a PNA with Augmentin 875mg x7 days and Zpack x5 days by Pulm Dr. Roque. Pt's CXR was negative but sputum cx was positive for H. Influenza. Her respiratory symptoms have resolved, no longer has cough or shortness of breath. Other symptoms she reports is b/l ear pain. Saw an ENT dr last week for it who performed hearing tests which were WNL. Prescribed Flonase which significantly relieves those symptoms. She reports diarrhea since starting her abx, last episode of diarrhea was last night she reports. Typically has 1-2 loose stools per day. She admits to decrease PO, loss of appetite and a 6 lb weight loss in a week and a half. Otherwise, she denies vision changes, hearing changes, sore throat, nasal congestion, chest pain, dyspnea, abdominal pain, nausea or vomiting. Denies focal weakness, numbness or tingling. Denies night sweats or fevers.       ED Course:   Vitals: BP: 121/82, HR: 105-->97, Temp: 99 F, RR: 17, SpO2: 98% on RA  Labs: no significant findings, d-dimer negative  RVP negative   UA: negative  CXR: no acute lung pathology   CT Head: no acute findings  MRI Brain: There is no mass, intracranial hemorrhage, or acute infarction.  MRA Head/Neck: No substantial intracranial arterial stenosis or large vessel occlusion. No hemodynamically significant arterial stenosis in the neck.  EKG: NSR, Hr: 93, no signs of acute ischemia   Received Meclizine 25 mg x1 and 1L NS bolus x1 in the ED  (2022 16:46)    INTERVAL HPI:  22: Admitted for generalized weakness, dizziness, weight loss, diarrhea. CT head, MRI head, and MRA head/neck unrevealing. Pt was tachycardic - D-dimer was negative. CTA chest prelim read was negative for PE but did reveal age-indeterminate 1.7 x 0.8cm focal outpouching from distal descending thoracic aorta compatible with penetrating ulcer/pseudoaneurysm. Official radiology report pending. EBV testing consistent with past infection. Other workup unrevealing for life-threatening cause of symptoms.   22: Pt seen and examined at bedside this morning. Pt reports her dizziness is improved. She says she had a headache earlier this morning that resolved with tylenol. Denies chest pain, fevers, chills, abd pain, nausea, vomiting. Had BM this morning.     OVERNIGHT EVENTS: NONE    Home Medications:  albuterol 90 mcg/inh inhalation aerosol: 1-2 puff(s) inhaled every 6 hours, As Needed (2022 10:38)  Flonase 50 mcg/inh nasal spray: 2 spray(s) in each affected nostril once a day (2022 15:58)  Lipitor 20 mg oral tablet: 1 tab(s) orally once a day (2022 10:38)  Norvasc 2.5 mg oral tablet: 1 tab(s) orally once a day (2022 10:38)  Symbicort 160 mcg-4.5 mcg/inh inhalation aerosol: 2 puff(s) inhaled 2 times a day (2022 10:38)      MEDICATIONS  (STANDING):  amLODIPine   Tablet 2.5 milliGRAM(s) Oral daily  atorvastatin 20 milliGRAM(s) Oral at bedtime  budesonide 160 MICROgram(s)/formoterol 4.5 MICROgram(s) Inhaler 2 Puff(s) Inhalation two times a day  enoxaparin Injectable 40 milliGRAM(s) SubCutaneous every 24 hours  fluticasone propionate 50 MICROgram(s)/spray Nasal Spray 2 Spray(s) Both Nostrils <User Schedule>  sodium chloride 0.9%. 1000 milliLiter(s) (75 mL/Hr) IV Continuous <Continuous>    MEDICATIONS  (PRN):  acetaminophen     Tablet .. 650 milliGRAM(s) Oral every 6 hours PRN Temp greater or equal to 38C (100.4F), Mild Pain (1 - 3)  ALBUTerol    90 MICROgram(s) HFA Inhaler 2 Puff(s) Inhalation every 6 hours PRN Shortness of Breath and/or Wheezing  aluminum hydroxide/magnesium hydroxide/simethicone Suspension 30 milliLiter(s) Oral every 4 hours PRN Dyspepsia  melatonin 3 milliGRAM(s) Oral at bedtime PRN Insomnia  ondansetron Injectable 4 milliGRAM(s) IV Push every 8 hours PRN Nausea and/or Vomiting      Allergies    No Known Allergies    Intolerances        Social History:  Tobacco: denies   EtOH: denies   Recreational drug use: denies  Lives with: son   Ambulates: independently   ADLs: independent   Occupation: Used to work as a  in a nursing home, retired in 2017, 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 No biomass exposure (2022 16:46)      REVIEW OF SYSTEMS:  CONSTITUTIONAL: No fever, No chills, No fatigue, No myalgia, No Body ache, No Weakness  EYES: No eye pain,  No visual disturbances, No discharge, NO Redness  ENMT:  No ear pain, No nose bleed, No vertigo; No sinus pain, NO throat pain, No Congestion  NECK: No pain, No stiffness  RESPIRATORY: No cough, NO wheezing, No  hemoptysis, NO  shortness of breath  CARDIOVASCULAR: No chest pain, palpitations  GASTROINTESTINAL: No abdominal pain, NO epigastric pain. No nausea, No vomiting; No diarrhea, No constipation. [ x ] BM  GENITOURINARY: No dysuria, No frequency, No urgency, No hematuria, NO incontinence  NEUROLOGICAL: No headaches, No dizziness, No numbness, No tingling, No tremors, No weakness  EXT: No Swelling, No Pain, No Edema  SKIN:  [ x ] No itching, burning, rashes, or lesions   MUSCULOSKELETAL: No joint pain ,No Jt swelling; No muscle pain, No back pain, No extremity pain  PSYCHIATRIC: No depression,  No anxiety,  No mood swings ,No difficulty sleeping at night  PAIN SCALE: [ x ] None  [  ] Other-  ROS Unable to obtain due to - [  ] Dementia  [  ] Lethargy [  ] Drowsy [  ] Sedated [  ] non verbal  REST OF REVIEW Of SYSTEM - [ x ] Normal     Vital Signs Last 24 Hrs  T(C): 36.7 (2022 04:28), Max: 37.2 (2022 10:25)  T(F): 98 (2022 04:28), Max: 99 (2022 10:25)  HR: 78 (2022 04:28) (78 - 105)  BP: 103/67 (2022 04:28) (103/67 - 133/76)  BP(mean): --  RR: 16 (2022 04:28) (16 - 17)  SpO2: 96% (2022 04:28) (94% - 98%)  Finger Stick        - @ 07:01  -  - @ 07:00  --------------------------------------------------------  IN: 975 mL / OUT: 0 mL / NET: 975 mL        PHYSICAL EXAM:  GENERAL:  [ x ] NAD , [ x ] well appearing, [  ] Agitated, [  ] Drowsy,  [  ] Lethargy, [  ] confused   HEAD:  [  ] Normal, [  ] Other  EYES:  [ x ] EOMI, [ x ] PERRLA, [ x ] conjunctiva and sclera clear normal, [  ] Other,  [  ] Pallor,[  ] Discharge  ENMT:  [ x ] Normal, [ x ] Moist mucous membranes, [ x ] Good dentition, [ x ] No Thrush  NECK:  [ x ] Supple, [ x ] No JVD, [  ] Normal thyroid, [  ] Lymphadenopathy [  ] Other  CHEST/LUNG:  [ x ] Clear to auscultation bilaterally, [ x ] Breath Sounds equal B/L / Decrease, [  ] poor effort  [ x ] No rales, [ x ] No rhonchi  [ x ]  No wheezing,   HEART:  [ x ] Regular rate and rhythm, [  ] tachycardia, [  ] Bradycardia,  [  ] irregular  [ x ] No murmurs, No rubs, No gallops, [  ] PPM in place (Mfr:  )  ABDOMEN:  [ x ] Soft, [ x ] Nontender, [ x ] Nondistended, [ x ] No mass, [  ] Bowel sounds present, [  ] obese  NERVOUS SYSTEM:  [ x ] Alert & Oriented X3, [ x ] Nonfocal  [  ] Confusion  [  ] Encephalopathic [  ] Sedated [  ] Unable to assess, [  ] Dementia [  ] Other-  EXTREMITIES: [  ] 2+ Peripheral Pulses, No clubbing, No cyanosis,  [  ] small 1+ mild pitting edema B/L lower EXT. [  ] PVD stasis skin changes B/L Lower EXT, [  ] wound  LYMPH: No lymphadenopathy noted  SKIN:  [ x ] No rashes or lesions, [  ] Pressure Ulcers, [  ] ecchymosis, [  ] Skin Tears, [  ] Other    DIET: Diet, Regular:   DASH/TLC Sodium & Cholesterol Restricted  Lacto Veg (Accepts Milk & Milk Products)  No Egg (22 @ 17:13)      LABS:                        12.1   6.65  )-----------( 296      ( 2022 08:44 )             37.1     04-05    140  |  106  |  5<L>  ----------------------------<  80  3.8   |  27  |  0.60    Ca    8.4<L>      2022 08:44  Phos  3.1     04-05  Mg     2.4     04-05    TPro  6.3  /  Alb  3.0<L>  /  TBili  0.3  /  DBili  x   /  AST  8<L>  /  ALT  19  /  AlkPhos  66  04-05    Urinalysis Basic - ( 2022 12:08 )    Color: Pale Yellow / Appearance: Clear / S.005 / pH: x  Gluc: x / Ketone: Negative  / Bili: Negative / Urobili: Negative   Blood: x / Protein: Negative / Nitrite: Negative   Leuk Esterase: Negative / RBC: x / WBC x   Sq Epi: x / Non Sq Epi: x / Bacteria: x        Culture Results:   No Growth Final ( @ 18:40)  Culture Results:   No Growth Final ( @ 18:40)  Culture Results:   <10,000 CFU/mL Normal Urogenital Jessi ( @ 18:27)          CARDIAC MARKERS ( 2022 17:00 )  x     / x     / 38 U/L / x     / x              Culture - Blood (collected 30 Mar 2022 18:40)  Source: .Blood Blood-Peripheral  Final Report (2022 19:00):    No Growth Final    Culture - Blood (collected 30 Mar 2022 18:40)  Source: .Blood Blood-Peripheral  Final Report (2022 19:00):    No Growth Final    Culture - Urine (collected 30 Mar 2022 18:27)  Source: Clean Catch Clean Catch (Midstream)  Final Report (31 Mar 2022 14:11):    <10,000 CFU/mL Normal Urogenital Jessi         Anemia Panel:      Thyroid Panel:  Thyroid Stimulating Hormone, Serum: 0.76 uIU/mL (22 @ 17:00)  Thyroid Stimulating Hormone, Serum: 0.93 uIU/mL (22 @ 14:38)                RADIOLOGY & ADDITIONAL TESTS:    < from: CT Head No Cont (22 @ 11:49) >    ACC: [id number] EXAM:  CT BRAIN                          PROCEDURE DATE:  2022          INTERPRETATION:  Clinical indications: Dizziness    Multiple axial sections were performed from base skull to vertex without   contrast enhancement. Coronaland sagittal destructions were performed as   well    This exam is compared with prior head CT performed on May 28, 2015    Parenchymal volume loss and chronic microvascular ischemic changes again   seen    There is no evidence cute hemorrhage mass or mass effect seen    Evaluation of the osseous structures with the appropriate window appears   normal    The visualized paranasal mastoid and middle ear regions appear clear.    IMPRESSION: Stable exam.    --- End of Report ---            CECELIA TRISTAN MD; Attending Radiologist  This document has been electronically signed. 2022 11:54AM    < end of copied text >  < from: Xray Chest 2 Views PA/Lat (22 @ 12:54) >    ACC: [id number] EXAM:  XR CHEST PA LAT 2V                          PROCEDURE DATE:  2022          INTERPRETATION:  PA and lateral chest on 2022 at 12:42 PM.   Patient recent pneumonia now has dizziness.    Heart size is within normal limits.    The lung fields and pleural surfaces are unremarkable.    Chest is similar to 2022.    IMPRESSION: Negative chest.    --- End of Report ---            WESTLEY BAZAN MD; Attending Radiologist  This document has been electronically signed. 2022  1:29PM    < end of copied text >  < from: MR Head No Cont (22 @ 13:42) >    ACC: [id number] EXAM:  MR ANGIO NECK                        ACC: [id number] EXAM:  MR ANGIO BRAIN                        ACC: [id number] EXAM:  MR BRAIN                          PROCEDURE DATE:  2022          INTERPRETATION:  STUDY: MR BRAIN WITHOUT CONTRAST.  MAGNETIC RESONANCE ANGIOGRAPHY OF THE HEAD AND NECK.      CLINICAL INDICATION: Dizziness. Posterior stroke    TECHNIQUE: Multiplanar, multisequence MR imaging of the brain was   performed. Noncontrast neck MRA was performed. 3-D time-of-flight. 3-D   reconstructions were performed. Carotid stenosis was calculated based on   NASCET criteria. Noncontrast MRA of the head was performed using   time-of-flight technique with 3-D reconstructions.    COMPARISON: CT head from same day    FINDINGS:  MR brain:  The ventricles and sulci are age appropriate . There is no evidence of   mass, intracranial hemorrhage, or acute infarction. There is no extra   axial collection. No midline shift or other significant mass effect is   noted.      There is normal flow-void within major cranial vessels suggestive of   their patency.    The orbital contents are grossly unremarkable. Mild polypoid mucosal   thickening in the maxillary sinuses. The mastoid air cells are   predominantly clear.    MRA HEAD:  Flow is identified in both petrocavernous and supraclinoid segments of   the carotid arteries. Flow is identified within the bilateral anterior   and middle cerebral arteries. Anterior communicating artery is present.   Both posterior communicating arteries are not definitively seen.    The distal vertebral arteries are patent. The basilar artery is patent.   Flow is seen in the posterior cerebral arteries and superior cerebellar   arteries.    There is no evidence of substantial arterial stenosis or occlusion.    There is no evidence of a dominant [place] of Cheng aneurysm or AVM.    MRA NECK:  There is no significant stenosis of the origins of the great vessels from   the aortic arch.    Right carotid: The right common carotid artery shows no significant   stenosis. There is no significant narrowing involving the right carotid   bifurcation. No evidence for any hemodynamically significant stenosis at   the ICA origin by NASCET criteria. The remainder of the right internal   carotid artery to the skull base shows no significant narrowing.    Left carotid: The left common carotid artery shows no significant   stenosis. There is no significant narrowing involving the left carotid   bifurcation. No evidence for any hemodynamically significant stenosis at   the ICA origin by NASCET criteria. The remainder of the left internal   carotid artery to the skull base shows no significant narrowing.    Vertebral arteries: There is flow seen in both cervical vertebral   arteries without evidence of high-grade stenosis.      IMPRESSION:  MR BRAIN:  There is no mass, intracranial hemorrhage, or acute infarction.  MRA head:  No substantial intracranial arterial stenosis or large vessel occlusion.  MRA neck:  No hemodynamically significant arterial stenosis in the neck.    --- End of Report ---            LOGAN TOTH MD; Attending Radiologist  This document has been electronically signed. 2022  3:17PM    < end of copied text >    < from: CT Angio Chest PE Protocol w/ IV Cont (22 @ 22:12) >    ******PRELIMINARY REPORT******      ******PRELIMINARY REPORT******       ACC: [id number] EXAM:  CT ANGIO CHEST PULM ART [facility]                          PROCEDURE DATE:  2022    ******PRELIMINARY REPORT******      ******PRELIMINARY REPORT******           INTERPRETATION:  No pulmonary embolism. Age indeterminate 1.7 x 0.8 cm   focal outpouching from the distal descending thoracic aorta compatible   with penetrating ulcer/pseudoaneurysm. F/U final report in AM.        ******PRELIMINARY REPORT******      ******PRELIMINARY REPORT******         KEVIN FUENTES MD; Attending Radiologist    < end of copied text >    HEALTH ISSUES - PROBLEM Dx:  Weakness with dizziness    Diarrhea    Tachycardia    Hypertension    Hyperlipidemia    Asthma    Need for prophylactic measure            Consultant(s) Notes Reviewed:  [ x ] YES     Care Discussed with [X] Consultants  [ x ] Patient  [ x ] Family [  ] HCP [  ]   [  ] Social Service  [  ] RN, [  ] Physical Therapy,[  ] Palliative care team  DVT PPX: [ x ] Lovenox, [  ] S C Heparin, [  ] Coumadin, [  ] Xarelto, [  ] Eliquis, [  ] Pradaxa, [  ] IV Heparin drip, [  ] SCD [  ] Contraindication 2 to GI Bleed,[  ] Ambulation [  ] Contraindicated 2 to  bleed [  ] Contraindicated 2 to Brain Bleed  Advanced directive: [ x ] None, [  ] DNR/DNI Patient is a 66y old  Female who presents with a chief complaint of weakness (2022 19:46)    HPI:  67 yo F with PMH HTN, HLD, mild GEORGINA, pre-diabetes, asthma, vitamin D deficiency presents to the ED with weakness and lightheadedness. Pt says that for the past week and a half she has been feeling very weak. Has been sleeping for about 15 hours per day which is very unusual for her. This morning she woke up with persistent weakness, lethargy and felt very lightheaded. She said she felt off-balanced which lasted for a few minutes so she came in to the ER for further evaluation. Of note, pt recently came from [facility] about 2-3 weeks ago. Pt was recently treated on 3/24 for a PNA with Augmentin 875mg x7 days and Zpack x5 days by Pulm Dr. Roque. Pt's CXR was negative but sputum cx was positive for H. Influenza. Her respiratory symptoms have resolved, no longer has cough or shortness of breath. Other symptoms she reports is b/l ear pain. Saw an ENT dr last week for it who performed hearing tests which were WNL. Prescribed Flonase which significantly relieves those symptoms. She reports diarrhea since starting her abx, last episode of diarrhea was last night she reports. Typically has 1-2 loose stools per day. She admits to decrease PO, loss of appetite and a 6 lb weight loss in a week and a half. Otherwise, she denies vision changes, hearing changes, sore throat, nasal congestion, chest pain, dyspnea, abdominal pain, nausea or vomiting. Denies focal weakness, numbness or tingling. Denies night sweats or fevers.       ED Course:   Vitals: BP: 121/82, HR: 105-->97, Temp: 99 F, RR: 17, SpO2: 98% on RA  Labs: no significant findings, d-dimer negative  RVP negative   UA: negative  CXR: no acute lung pathology   CT Head: no acute findings  MRI Brain: There is no mass, intracranial hemorrhage, or acute infarction.  MRA Head/Neck: No substantial intracranial arterial stenosis or large vessel occlusion. No hemodynamically significant arterial stenosis in the neck.  EKG: NSR, Hr: 93, no signs of acute ischemia   Received Meclizine 25 mg x1 and 1L NS bolus x1 in the ED  (2022 16:46)    INTERVAL HPI:  22: Admitted for generalized weakness, dizziness, weight loss, diarrhea. CT head, MRI head, and MRA head/neck unrevealing. Pt was tachycardic - D-dimer was negative. CTA chest prelim read was negative for PE but did reveal age-indeterminate 1.7 x 0.8cm focal outpouching from distal descending thoracic aorta compatible with penetrating ulcer/pseudoaneurysm. Official radiology report pending. EBV testing consistent with past infection. Other workup unrevealing for life-threatening cause of symptoms.   22: Pt seen and examined at bedside this morning. Pt reports her dizziness is improved. She says she had a headache earlier this morning that resolved with tylenol. Denies chest pain, fevers, chills, abd pain, nausea, vomiting. Had BM this morning. D/C planning.    OVERNIGHT EVENTS: NONE    Home Medications:  albuterol 90 mcg/inh inhalation aerosol: 1-2 puff(s) inhaled every 6 hours, As Needed (2022 10:38)  Flonase 50 mcg/inh nasal spray: 2 spray(s) in each affected nostril once a day (2022 15:58)  Lipitor 20 mg oral tablet: 1 tab(s) orally once a day (2022 10:38)  Norvasc 2.5 mg oral tablet: 1 tab(s) orally once a day (2022 10:38)  Symbicort 160 mcg-4.5 mcg/inh inhalation aerosol: 2 puff(s) inhaled 2 times a day (2022 10:38)      MEDICATIONS  (STANDING):  amLODIPine   Tablet 2.5 milliGRAM(s) Oral daily  atorvastatin 20 milliGRAM(s) Oral at bedtime  budesonide 160 MICROgram(s)/formoterol 4.5 MICROgram(s) Inhaler 2 Puff(s) Inhalation two times a day  enoxaparin Injectable 40 milliGRAM(s) SubCutaneous every 24 hours  fluticasone propionate 50 MICROgram(s)/spray Nasal Spray 2 Spray(s) Both Nostrils <User Schedule>  sodium chloride 0.9%. 1000 milliLiter(s) (75 mL/Hr) IV Continuous <Continuous>    MEDICATIONS  (PRN):  acetaminophen     Tablet .. 650 milliGRAM(s) Oral every 6 hours PRN Temp greater or equal to 38C (100.4F), Mild Pain (1 - 3)  ALBUTerol    90 MICROgram(s) HFA Inhaler 2 Puff(s) Inhalation every 6 hours PRN Shortness of Breath and/or Wheezing  aluminum hydroxide/magnesium hydroxide/simethicone Suspension 30 milliLiter(s) Oral every 4 hours PRN Dyspepsia  melatonin 3 milliGRAM(s) Oral at bedtime PRN Insomnia  ondansetron Injectable 4 milliGRAM(s) IV Push every 8 hours PRN Nausea and/or Vomiting      Allergies    No Known Allergies    Intolerances        Social History:  Tobacco: denies   EtOH: denies   Recreational drug use: denies  Lives with: son   Ambulates: independently   ADLs: independent   Occupation: Used to work as a  in a nursing home, retired in 2017, 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 No biomass exposure (2022 16:46)      REVIEW OF SYSTEMS: i feel better   CONSTITUTIONAL: No fever, No chills, No fatigue, No myalgia, No Body ache, No Weakness  EYES: No eye pain,  No visual disturbances, No discharge, NO Redness  ENMT:  No ear pain, No nose bleed, No vertigo; No sinus pain, NO throat pain, No Congestion  NECK: No pain, No stiffness  RESPIRATORY: No cough, NO wheezing, No  hemoptysis, NO  shortness of breath  CARDIOVASCULAR: No chest pain, palpitations  GASTROINTESTINAL: No abdominal pain, NO epigastric pain. No nausea, No vomiting; No diarrhea, No constipation. [ x ] BM  GENITOURINARY: No dysuria, No frequency, No urgency, No hematuria, NO incontinence  NEUROLOGICAL: No headaches, No dizziness, No numbness, No tingling, No tremors, No weakness  EXT: No Swelling, No Pain, No Edema  SKIN:  [ x ] No itching, burning, rashes, or lesions   MUSCULOSKELETAL: No joint pain ,No Jt swelling; No muscle pain, No back pain, No extremity pain  PSYCHIATRIC: No depression,  No anxiety,  No mood swings ,No difficulty sleeping at night  PAIN SCALE: [ x ] None  [  ] Other-  ROS Unable to obtain due to - [  ] Dementia  [  ] Lethargy [  ] Drowsy [  ] Sedated [  ] non verbal  REST OF REVIEW Of SYSTEM - [ x ] Normal     Vital Signs Last 24 Hrs  T(C): 36.7 (2022 04:28), Max: 37.2 (2022 10:25)  T(F): 98 (2022 04:28), Max: 99 (2022 10:25)  HR: 78 (:28) (78 - 105)  BP: 103/67 (2022 04:28) (103/67 - 133/76)  BP(mean): --  RR: 16 (2022 04:28) (16 - 17)  SpO2: 96% (:28) (94% - 98%)  Finger Stick        - @ 07:01  -  - @ 07:00  --------------------------------------------------------  IN: 975 mL / OUT: 0 mL / NET: 975 mL        PHYSICAL EXAM:  GENERAL:  [ x ] NAD , [ x ] well appearing, [  ] Agitated, [  ] Drowsy,  [  ] Lethargy, [  ] confused   HEAD:  [  ] Normal, [  ] Other  EYES:  [ x ] EOMI, [ x ] PERRLA, [ x ] conjunctiva and sclera clear normal, [  ] Other,  [  ] Pallor,[  ] Discharge  ENMT:  [ x ] Normal, [ x ] Moist mucous membranes, [ x ] Good dentition, [ x ] No Thrush  NECK:  [ x ] Supple, [ x ] No JVD, [  ] Normal thyroid, [  ] Lymphadenopathy [  ] Other  CHEST/LUNG:  [ x ] Clear to auscultation bilaterally, [ x ] Breath Sounds equal B/L / Decrease, [  ] poor effort  [ x ] No rales, [ x ] No rhonchi  [ x ]  No wheezing,   HEART:  [ x ] Regular rate and rhythm, [  ] tachycardia, [  ] Bradycardia,  [  ] irregular  [ x ] No murmurs, No rubs, No gallops, [  ] PPM in place (Mfr:  )  ABDOMEN:  [ x ] Soft, [ x ] Nontender, [ x ] Nondistended, [ x ] No mass, [  ] Bowel sounds present, [  ] obese  NERVOUS SYSTEM:  [ x ] Alert & Oriented X3, [ x ] Nonfocal  [  ] Confusion  [  ] Encephalopathic [  ] Sedated [  ] Unable to assess, [  ] Dementia [  ] Other-  EXTREMITIES: [  ] 2+ Peripheral Pulses, No clubbing, No cyanosis,  [  ] small 1+ mild pitting edema B/L lower EXT. [  ] PVD stasis skin changes B/L Lower EXT, [  ] wound  LYMPH: No lymphadenopathy noted  SKIN:  [ x ] No rashes or lesions, [  ] Pressure Ulcers, [  ] ecchymosis, [  ] Skin Tears, [  ] Other    DIET: Diet, Regular:   DASH/TLC Sodium & Cholesterol Restricted  Lacto Veg (Accepts Milk & Milk Products)  No Egg (22 @ 17:13)      LABS:                        12.1   6.65  )-----------( 296      ( 2022 08:44 )             37.1     04-05    140  |  106  |  5<L>  ----------------------------<  80  3.8   |  27  |  0.60    Ca    8.4<L>      2022 08:44  Phos  3.1     04-05  Mg     2.4     04-05    TPro  6.3  /  Alb  3.0<L>  /  TBili  0.3  /  DBili  x   /  AST  8<L>  /  ALT  19  /  AlkPhos  66  04-05    Urinalysis Basic - ( 2022 12:08 )    Color: Pale Yellow / Appearance: Clear / S.005 / pH: x  Gluc: x / Ketone: Negative  / Bili: Negative / Urobili: Negative   Blood: x / Protein: Negative / Nitrite: Negative   Leuk Esterase: Negative / RBC: x / WBC x   Sq Epi: x / Non Sq Epi: x / Bacteria: x        Culture Results:   No Growth Final ( @ 18:40)  Culture Results:   No Growth Final ( @ 18:40)  Culture Results:   <10,000 CFU/mL Normal Urogenital Jessi ( @ 18:27)          CARDIAC MARKERS ( 2022 17:00 )  x     / x     / 38 U/L / x     / x              Culture - Blood (collected 30 Mar 2022 18:40)  Source: .Blood Blood-Peripheral  Final Report (2022 19:00):    No Growth Final    Culture - Blood (collected 30 Mar 2022 18:40)  Source: .Blood Blood-Peripheral  Final Report (2022 19:00):    No Growth Final    Culture - Urine (collected 30 Mar 2022 18:27)  Source: Clean Catch Clean Catch (Midstream)  Final Report (31 Mar 2022 14:11):    <10,000 CFU/mL Normal Urogenital Jessi         Anemia Panel:      Thyroid Panel:  Thyroid Stimulating Hormone, Serum: 0.76 uIU/mL (22 @ 17:00)  Thyroid Stimulating Hormone, Serum: 0.93 uIU/mL (22 @ 14:38)      RADIOLOGY & ADDITIONAL TESTS:    < from: CT Angio Chest PE Protocol w/ IV Cont (22 @ 22:12) >  INTERPRETATION:  CLINICAL INFORMATION: Tachycardia. Evaluate for   pulmonary embolus versus pneumonia.    COMPARISON: Chest x-ray 2022.    CONTRAST/COMPLICATIONS:  IV Contrast: Omnipaque 350  51 cc administered   49 cc discarded  Oral Contrast: NONE  Complications: None reported at time of study completion    PROCEDURE:  CT Angiography of the Chest.  Sagittal and coronal reformats were performed as well as 3D (MIP)   reconstructions.    FINDINGS:    LUNGS AND AIRWAYS: Central airways are patent. No large focal   consolidation. Minimal peripheral dependent scarring and groundglass. Few   subcentimeter pulmonary nodules, for reference measuring 4 mm of the   right upper lobe, image 165 series 3. A 3 mm nodule along the horizontal   fissure, image 197 series 3, may represent intrafissural lymph node.  PLEURA: No pleural effusion or pneumothorax.  MEDIASTINUM AND SHANON: Likelysubsternal extension of thyroid gland along   pretracheal region, image 16 series 2, suboptimally characterized due to   streak artifact. Correlate with nuclear medicine thyroid scan for   confirmation. There is an 8 mm distal paraesophageal node, image 75   series 2, of unclear significance. Additional small volume lymph nodes of   the thorax.  VESSELS: No pulmonary embolus. Main pulmonary artery size within normal   limits. Atheromatous changes of the thoracic aorta. In particular, there   is focal penetrating ulcer along the distal descending thoracic aorta   measuring 0.9 cm (depth) x 2.0 cm. The descending thoracic aorta is   ectatic in this location, measuring 2.8 cm in maximum dimension.  HEART: Heart size is within normal limits. Trace pericardial fluid.  CHEST WALL AND LOWER NECK: No chest wall hematoma.  VISUALIZED UPPER ABDOMEN: Minimal mesenteric haziness partially imaged.  BONES: Degenerative changes. Slight scoliotic curvature.    IMPRESSION:    No pulmonary embolus. No pneumonia.    Distal descending thoracic aorta penetrating ulcer, 0.9 cm (depth) x 2.0   cm. Consider vascular surgery follow-up and CTA chest in 6 months.    Likely substernal extension of thyroid gland along pretracheal region,   suboptimally characterized due to streak artifact. Correlate with nuclear   medicine thyroid scan for confirmation.    Subcentimeter pulmonary nodules up to 4 mm can be followed on subsequent   imaging.      < end of copied text >      < from: CT Head No Cont (22 @ 11:49) >    ACC: [id number] EXAM:  CT BRAIN                          PROCEDURE DATE:  2022          INTERPRETATION:  Clinical indications: Dizziness    Multiple axial sections were performed from base skull to vertex without   contrast enhancement. [place] sagittal destructions were performed as   well    This exam is compared with prior head CT performed on May 28, 2015    Parenchymal volume loss and chronic microvascular ischemic changes again   seen    There is no evidence cute hemorrhage mass or mass effect seen    Evaluation of the osseous structures with the appropriate window appears   normal    The visualized paranasal mastoid and middle ear regions appear clear.    IMPRESSION: Stable exam.    --- End of Report ---      CECELIA TRISTAN MD; Attending Radiologist  This document has been electronically signed. 2022 11:54AM    < end of copied text >  < from: Xray Chest 2 Views PA/Lat (22 @ 12:54) >    ACC: [id number] EXAM:  XR CHEST PA LAT 2V                          PROCEDURE DATE:  2022          INTERPRETATION:  PA and lateral chest on 2022 at 12:42 PM.   Patient recent pneumonia now has dizziness.    Heart size is within normal limits.    The lung fields and pleural surfaces are unremarkable.    Chest is similar to 2022.    IMPRESSION: Negative chest.    --- End of Report ---            WESTLEY BAZAN MD; Attending Radiologist  This document has been electronically signed. 2022  1:29PM    < end of copied text >  < from: MR Head No Cont (22 @ 13:42) >    ACC: [id number] EXAM:  MR ANGIO NECK                        ACC: [id number] EXAM:  MR ANGIO BRAIN                        ACC: [id number] EXAM:  MR BRAIN                          PROCEDURE DATE:  2022          INTERPRETATION:  STUDY: MR BRAIN WITHOUT CONTRAST.  MAGNETIC RESONANCE ANGIOGRAPHY OF THE HEAD AND NECK.      CLINICAL INDICATION: Dizziness. Posterior stroke    TECHNIQUE: Multiplanar, multisequence MR imaging of the brain was   performed. Noncontrast neck MRA was performed. 3-D time-of-flight. 3-D   reconstructions were performed. Carotid stenosis was calculated based on   NASCET criteria. Noncontrast MRA of the head was performed using   time-of-flight technique with 3-D reconstructions.    COMPARISON: CT head from same day    FINDINGS:  MR brain:  The ventricles and sulci are age appropriate . There is no evidence of   mass, intracranial hemorrhage, or acute infarction. There is no extra   axial collection. No midline shift or other significant mass effect is   noted.      There is normal flow-void within major cranial vessels suggestive of   their patency.    The orbital contents are grossly unremarkable. Mild polypoid mucosal   thickening in the maxillary sinuses. The mastoid air cells are   predominantly clear.    MRA HEAD:  Flow is identified in both petrocavernous and supraclinoid segments of   the carotid arteries. Flow is identified within the bilateral anterior   and middle cerebral arteries. Anterior communicating artery is present.   Both posterior communicating arteries are not definitively seen.    The distal vertebral arteries are patent. The basilar artery is patent.   Flow is seen in the posterior cerebral arteries and superior cerebellar   arteries.    There is no evidence of substantial arterial stenosis or occlusion.    There is no evidence of a dominant [place] aneurysm or AVM.    MRA NECK:  There is no significant stenosis of the origins of the great vessels from   the aortic arch.    Right carotid: The right common carotid artery shows no significant   stenosis. There is no significant narrowing involving the right carotid   bifurcation. No evidence for any hemodynamically significant stenosis at   the ICA origin by NASCET criteria. The remainder of the right internal   carotid artery to the skull base shows no significant narrowing.    Left carotid: The left common carotid artery shows no significant   stenosis. There is no significant narrowing involving the left carotid   bifurcation. No evidence for any hemodynamically significant stenosis at   the ICA origin by NASCET criteria. The remainder of the left internal   carotid artery to the skull base shows no significant narrowing.    Vertebral arteries: There is flow seen in both cervical vertebral   arteries without evidence of high-grade stenosis.      IMPRESSION:  MR BRAIN:  There is no mass, intracranial hemorrhage, or acute infarction.  MRA head:  No substantial intracranial arterial stenosis or large vessel occlusion.  MRA neck:  No hemodynamically significant arterial stenosis in the neck.    --- End of Report ---        LOGAN TOTH MD; Attending Radiologist  This document has been electronically signed. 2022  3:17PM    < end of copied text >    < from: CT Angio Chest PE Protocol w/ IV Cont (22 @ 22:12) >        INTERPRETATION:  No pulmonary embolism. Age indeterminate 1.7 x 0.8 cm   focal outpouching from the distal descending thoracic aorta compatible   with penetrating ulcer/pseudoaneurysm. F/U final report in AM.      ******PRELIMINARY REPORT******        KEVIN FUENTES MD; Attending Radiologist    < end of copied text >    HEALTH ISSUES - PROBLEM Dx:  Weakness with dizziness    Diarrhea    Tachycardia    Hypertension    Hyperlipidemia    Asthma    Need for prophylactic measure      Consultant(s) Notes Reviewed:  [ x ] YES     Care Discussed with [X] Consultants  [ x ] Patient  [ x ] Family [  ] HCP [  ]   [  ] Social Service  [ x ] RN, [  ] Physical Therapy,[  ] Palliative care team  DVT PPX: [ x ] Lovenox, [  ] S C Heparin, [  ] Coumadin, [  ] Xarelto, [  ] Eliquis, [  ] Pradaxa, [  ] IV Heparin drip, [  ] SCD [  ] Contraindication 2 to GI Bleed,[  ] Ambulation [  ] Contraindicated 2 to  bleed [  ] Contraindicated 2 to Brain Bleed  Advanced directive: [ x ] None, [  ] DNR/DNI independent

## 2023-05-20 NOTE — PHYSICAL THERAPY INITIAL EVALUATION ADULT - PERTINENT HX OF CURRENT PROBLEM, REHAB EVAL
63-year-old female with a history of DM, HLD, asthma, kidney stones, HTN presents with left-sided facial numbness and dizziness. Her PMD recommended that she take an extra dose of amlodipine 5 mg at night.  Patient states that the symptoms resolved and she felt well all day yesterday.  At 2 PM today patient reports feeling dizzy with mild left-sided headache.  Around 5 PM today she again developed the left-sided facial numbness and she took another dose of amlodipine.  Her blood pressure at home was 175/103.  Patient denies headache, blurry vision, slurred speech, weakness to her extremities.

## 2023-05-20 NOTE — PHYSICAL THERAPY INITIAL EVALUATION ADULT - ACTIVE RANGE OF MOTION EXAMINATION, REHAB EVAL
bertram. upper extremity Active ROM was WNL (within normal limits)/bilateral lower extremity Active ROM was WNL (within normal limits)

## 2023-05-20 NOTE — PATIENT PROFILE ADULT - FALL HARM RISK - HARM RISK INTERVENTIONS

## 2023-05-20 NOTE — PATIENT PROFILE ADULT - NSPROPOAURINARYCATHETER_GEN_A_NUR

## 2023-05-20 NOTE — PHYSICAL THERAPY INITIAL EVALUATION ADULT - PLANNED THERAPY INTERVENTIONS, PT EVAL
pt is independent with all functional mobility at this time and has no further skilled inpatient PT needs, D/C PT. Pt's family available to assist at home as needed.

## 2023-05-20 NOTE — CARE COORDINATION ASSESSMENT. - NSPASTMEDSURGHISTORY_GEN_ALL_CORE_FT
PAST MEDICAL & SURGICAL HISTORY:  Asthma      Hydronephrosis, unspecified hydronephrosis type      Calculus of kidney      Hypertension      S/P LASIK surgery  left eye      H/O tubal ligation      Thyroid activity decreased      Hydronephrosis  right      Herpes simplex      DM (diabetes mellitus)      Impaired fasting glucose      HLD (hyperlipidemia)      GERD (gastroesophageal reflux disease)      H/O lithotripsy

## 2023-05-20 NOTE — CONSULT NOTE ADULT - SUBJECTIVE AND OBJECTIVE BOX
Patient is a 63y old  Female who presents with a chief complaint of Face numbness. (20 May 2023 09:26)    HPI: This is a 64 y/o F with PMH of HTN, DM 2, Dyslipidemia,  Hypothyroidism, Asthma, Nephrolithiasis s/p lithotomy, and GERD who presented with left lower face numbness started around 2 pm 5/19 was on for about 10 minutes, then resolved & was replaced by some "pressure" sensation involving the left half of the head including left eye & left side of the face, believes she was light headed with blurry vision, denies any focal muscle weakness or numbness anywhere else in her body, no associated speech or gait abnormality. Patient checked her blood pressure several times, and believes it went up when she got the face numbness, 's, then back to normal when she took an extra dose of Amlodipine 5 mg. Of note that she got a similar episode3 days ago, and about 4 years ago. Currently patient denies having any symptoms. (19 May 2023 23:20)      PAST MEDICAL & SURGICAL HISTORY:    Hypertension    Calculus of kidney    Hydronephrosis, unspecified hydronephrosis type    Asthma    GERD (gastroesophageal reflux disease)    HLD (hyperlipidemia)    Impaired fasting glucose    DM (diabetes mellitus)    Herpes simplex    Hydronephrosis  right    Thyroid activity decreased    H/O tubal ligation    S/P LASIK surgery  left eye    H/O lithotripsy    MEDICATIONS  (STANDING):    amLODIPine   Tablet 5 milliGRAM(s) Oral daily  aspirin enteric coated 81 milliGRAM(s) Oral daily  clopidogrel Tablet 75 milliGRAM(s) Oral daily  dextrose 5%. 1000 milliLiter(s) (50 mL/Hr) IV Continuous <Continuous>  dextrose 5%. 1000 milliLiter(s) (100 mL/Hr) IV Continuous <Continuous>  dextrose 50% Injectable 25 Gram(s) IV Push once  dextrose 50% Injectable 25 Gram(s) IV Push once  dextrose 50% Injectable 12.5 Gram(s) IV Push once  glucagon  Injectable 1 milliGRAM(s) IntraMuscular once  heparin   Injectable 5000 Unit(s) SubCutaneous every 8 hours  insulin lispro (ADMELOG) corrective regimen sliding scale   SubCutaneous three times a day before meals  insulin lispro (ADMELOG) corrective regimen sliding scale   SubCutaneous at bedtime  levothyroxine 25 MICROGram(s) Oral daily  metoprolol succinate ER 25 milliGRAM(s) Oral daily    MEDICATIONS  (PRN):    dextrose Oral Gel 15 Gram(s) Oral once PRN Blood Glucose LESS THAN 70 milliGRAM(s)/deciliter    Allergies    Cipro (Unknown)    SOCIAL HISTORY:    No h/o Smoking.   No h/o alcohol use.    FAMILY HISTORY:    Family history of asthma (Mother)    REVIEW OF SYSTEMS:    CONSTITUTIONAL: No fever  EYES: No eye pain,   ENMT:  No sinus or throat pain  NECK: No pain or stiffness  RESPIRATORY: No cough, No hemoptysis; No shortness of breath  CARDIOVASCULAR: No acute chest pain, palpitations,  or leg swelling  GASTROINTESTINAL: No abdominal pain. No nausea, vomiting, or hematemesis;  No melena or hematochezia.  GENITOURINARY: No  hematuria, or incontinence  MUSCULOSKELETAL: No joint swelling; No extremity pain  SKIN: No itching, rashes, or lesions   LYMPH NODES: No enlarged glands  NEUROLOGICAL: No headaches, memory loss,   PSYCHIATRIC: No depression, anxiety, mood swings, or difficulty sleeping  ENDOCRINE: No heat or cold intolerance;   HEME/LYMPH: No easy bruising, or bleeding gums  Allergy/Immunology. No medication allergy. No seasonal allergies.    PHYSICAL EXAM:  Vital Signs Last 24 Hrs  T(F): 98.2 (05-20-23 @ 08:05)  HR: 78 (05-20-23 @ 08:05)  BP: 148/78 (05-20-23 @ 08:05)  RR: 20 (05-20-23 @ 08:05)    GENERAL: NAD, well-groomed, well-developed  HEAD:  Atraumatic, Normocephalic  EYES: EOMI, PERRLA, conjunctiva and sclera clear  NECK: Supple, No JVD    On Neurological Examination:    Mental Status - Pt is alert, awake, oriented X3. Higher functions are intact. Follows commands well and able to answer questions appropriately.    Speech -  Normal. Pt has no aphasia.    Cranial Nerves - Pupils 3 mm equal and reactive to light, extraocular eye movements intact. Pt has no visual field deficit.  No facial asymmetry. Tongue - is in midline.    Motor Exam - 4 plus/5 all over, No drift. No shaking or tremors.    Sensory Exam - Pin prick, temperature, joint position and vibration are intact on either side.     Gait - Able to stand and walk unassisted.    Deep tendon Reflexes - 2 plus all over.    Coordination - Fine finger movements are normal on both sides. Finger to nose is also normal on both sides.       Romberg - Negative.    Neck Supple -  Yes.    LABS:                        12.4   9.62  )-----------( 286      ( 20 May 2023 06:42 )             37.6     05-20    142  |  105  |  7   ----------------------------<  121<H>  3.8   |  26  |  0.47<L>    Ca    8.9      20 May 2023 06:42    TPro  8.4<H>  /  Alb  4.4  /  TBili  0.3  /  DBili  x   /  AST  23  /  ALT  28  /  AlkPhos  83  05-19    PT/INR - ( 19 May 2023 20:04 )   PT: 11.1 sec;   INR: 0.97 ratio      PTT - ( 19 May 2023 20:04 )  PTT:29.5 sec    RADIOLOGY & ADDITIONAL STUDIES:    < from: CT Brain Stroke Protocol (05.19.23 @ 20:30) >    -No acute transcortical infarct or intracranial hemorrhage.    < end of copied text >    < from: CT Angio Neck Stroke Protocol w/ IV Cont (05.19.23 @ 20:37) >    IMPRESSION:    CT angiogram head:    -No vaso-occlusive disease.    CT angiogram neck:    -No vaso-occlusive disease.    < end of copied text >

## 2023-05-20 NOTE — PHYSICAL THERAPY INITIAL EVALUATION ADULT - ADDITIONAL COMMENTS
Pt lives with , son and DIL in a house with 2 AMOS -HR, 1 full flight of steps to get to bedroom. Pt and daughter report that pt is very active and independent with all functional mobility and ADLs. Pt did not use any ADs at home prior to hospitalization. Pt's family is available to assist at home as needed.

## 2023-05-20 NOTE — OCCUPATIONAL THERAPY INITIAL EVALUATION ADULT - ADDITIONAL COMMENTS
Lives with family in a home with 2 steps to enter and flight to bathroom/stall shower. Independent prior. Family provided Crystal translation

## 2023-05-20 NOTE — CARE COORDINATION ASSESSMENT. - NSCAREPROVIDERS_GEN_ALL_CORE_FT
CARE PROVIDERS:  Accepting Physician: Arsh Harding  Admitting: Arsh Harding  Attending: Arsh Harding  Cardiology Technician: Valerie Coker  Case Management: Rica Manjarrez  Consultant: Aguilar Reynolds  ED Attending: Jenn Gonzalez  ED Nurse: Dianne Honeycutt  Nurse: Adam, Merline  Nurse: Luis Yeboah  Occupational Therapy: Kelly Soto  Ordered: Physician, Ordering  Outpatient Provider: Omer Land  Override: Luis Yeboah  PCA/Nursing Assistant: Gladys Hubbard  Physical Therapy: Chacha Raman  Primary Team: Eduar Goyal  Primary Team: Arsh Harding  Primary Team: Donnell Young  Registered Dietitian: Sarah Smith  Respiratory Therapy: Sy Roberson  : Daysi Eli  Team: JANY  Hospitalists, Team  UR// Supp. Assoc.: Araceli Waters

## 2023-05-20 NOTE — SOCIAL WORK PROGRESS NOTE - NSSWPROGRESSNOTE_GEN_ALL_CORE
Pt admitted with TIA. SW consult received for TIA. Pt was seen and is being followed by CM for any needs.

## 2023-05-21 LAB
A1C WITH ESTIMATED AVERAGE GLUCOSE RESULT: 6.9 % — HIGH (ref 4–5.6)
ESTIMATED AVERAGE GLUCOSE: 151 MG/DL — HIGH (ref 68–114)
T4 AB SER-ACNC: 8.6 UG/DL — SIGNIFICANT CHANGE UP (ref 4.6–12)

## 2023-05-21 PROCEDURE — 99233 SBSQ HOSP IP/OBS HIGH 50: CPT

## 2023-05-21 RX ADMIN — HEPARIN SODIUM 5000 UNIT(S): 5000 INJECTION INTRAVENOUS; SUBCUTANEOUS at 12:48

## 2023-05-21 RX ADMIN — HEPARIN SODIUM 5000 UNIT(S): 5000 INJECTION INTRAVENOUS; SUBCUTANEOUS at 21:21

## 2023-05-21 RX ADMIN — Medication 25 MILLIGRAM(S): at 06:20

## 2023-05-21 RX ADMIN — HEPARIN SODIUM 5000 UNIT(S): 5000 INJECTION INTRAVENOUS; SUBCUTANEOUS at 06:20

## 2023-05-21 RX ADMIN — Medication 81 MILLIGRAM(S): at 12:48

## 2023-05-21 RX ADMIN — Medication 25 MICROGRAM(S): at 06:20

## 2023-05-21 RX ADMIN — CLOPIDOGREL BISULFATE 75 MILLIGRAM(S): 75 TABLET, FILM COATED ORAL at 12:48

## 2023-05-21 RX ADMIN — AMLODIPINE BESYLATE 5 MILLIGRAM(S): 2.5 TABLET ORAL at 06:20

## 2023-05-22 PROCEDURE — 99232 SBSQ HOSP IP/OBS MODERATE 35: CPT

## 2023-05-22 RX ADMIN — HEPARIN SODIUM 5000 UNIT(S): 5000 INJECTION INTRAVENOUS; SUBCUTANEOUS at 21:28

## 2023-05-22 RX ADMIN — Medication 81 MILLIGRAM(S): at 11:49

## 2023-05-22 RX ADMIN — HEPARIN SODIUM 5000 UNIT(S): 5000 INJECTION INTRAVENOUS; SUBCUTANEOUS at 05:39

## 2023-05-22 RX ADMIN — Medication 25 MILLIGRAM(S): at 05:39

## 2023-05-22 RX ADMIN — AMLODIPINE BESYLATE 5 MILLIGRAM(S): 2.5 TABLET ORAL at 05:39

## 2023-05-22 RX ADMIN — Medication 25 MICROGRAM(S): at 05:39

## 2023-05-22 RX ADMIN — Medication 4: at 16:28

## 2023-05-22 RX ADMIN — HEPARIN SODIUM 5000 UNIT(S): 5000 INJECTION INTRAVENOUS; SUBCUTANEOUS at 13:32

## 2023-05-22 RX ADMIN — CLOPIDOGREL BISULFATE 75 MILLIGRAM(S): 75 TABLET, FILM COATED ORAL at 11:47

## 2023-05-23 ENCOUNTER — TRANSCRIPTION ENCOUNTER (OUTPATIENT)
Age: 63
End: 2023-05-23

## 2023-05-23 ENCOUNTER — OUTPATIENT (OUTPATIENT)
Dept: OUTPATIENT SERVICES | Facility: HOSPITAL | Age: 63
LOS: 1 days | End: 2023-05-23
Payer: COMMERCIAL

## 2023-05-23 VITALS
TEMPERATURE: 98 F | HEART RATE: 72 BPM | SYSTOLIC BLOOD PRESSURE: 128 MMHG | RESPIRATION RATE: 18 BRPM | OXYGEN SATURATION: 97 % | DIASTOLIC BLOOD PRESSURE: 81 MMHG

## 2023-05-23 DIAGNOSIS — Z98.89 OTHER SPECIFIED POSTPROCEDURAL STATES: Chronic | ICD-10-CM

## 2023-05-23 DIAGNOSIS — G45.9 TRANSIENT CEREBRAL ISCHEMIC ATTACK, UNSPECIFIED: ICD-10-CM

## 2023-05-23 DIAGNOSIS — Z98.51 TUBAL LIGATION STATUS: Chronic | ICD-10-CM

## 2023-05-23 PROCEDURE — 84484 ASSAY OF TROPONIN QUANT: CPT

## 2023-05-23 PROCEDURE — G0378: CPT

## 2023-05-23 PROCEDURE — 84443 ASSAY THYROID STIM HORMONE: CPT

## 2023-05-23 PROCEDURE — 80061 LIPID PANEL: CPT

## 2023-05-23 PROCEDURE — 36415 COLL VENOUS BLD VENIPUNCTURE: CPT

## 2023-05-23 PROCEDURE — 85025 COMPLETE CBC W/AUTO DIFF WBC: CPT

## 2023-05-23 PROCEDURE — 97161 PT EVAL LOW COMPLEX 20 MIN: CPT

## 2023-05-23 PROCEDURE — 99291 CRITICAL CARE FIRST HOUR: CPT | Mod: 25

## 2023-05-23 PROCEDURE — 80053 COMPREHEN METABOLIC PANEL: CPT

## 2023-05-23 PROCEDURE — 86803 HEPATITIS C AB TEST: CPT

## 2023-05-23 PROCEDURE — 83036 HEMOGLOBIN GLYCOSYLATED A1C: CPT

## 2023-05-23 PROCEDURE — 97165 OT EVAL LOW COMPLEX 30 MIN: CPT

## 2023-05-23 PROCEDURE — 71045 X-RAY EXAM CHEST 1 VIEW: CPT

## 2023-05-23 PROCEDURE — 82962 GLUCOSE BLOOD TEST: CPT

## 2023-05-23 PROCEDURE — 70450 CT HEAD/BRAIN W/O DYE: CPT | Mod: MA

## 2023-05-23 PROCEDURE — 93306 TTE W/DOPPLER COMPLETE: CPT

## 2023-05-23 PROCEDURE — 85730 THROMBOPLASTIN TIME PARTIAL: CPT

## 2023-05-23 PROCEDURE — 93005 ELECTROCARDIOGRAM TRACING: CPT

## 2023-05-23 PROCEDURE — 84436 ASSAY OF TOTAL THYROXINE: CPT

## 2023-05-23 PROCEDURE — A9579: CPT

## 2023-05-23 PROCEDURE — 80048 BASIC METABOLIC PNL TOTAL CA: CPT

## 2023-05-23 PROCEDURE — 70496 CT ANGIOGRAPHY HEAD: CPT | Mod: MA

## 2023-05-23 PROCEDURE — 70553 MRI BRAIN STEM W/O & W/DYE: CPT

## 2023-05-23 PROCEDURE — 99239 HOSP IP/OBS DSCHRG MGMT >30: CPT

## 2023-05-23 PROCEDURE — 70498 CT ANGIOGRAPHY NECK: CPT | Mod: MA

## 2023-05-23 PROCEDURE — 85610 PROTHROMBIN TIME: CPT

## 2023-05-23 PROCEDURE — 70553 MRI BRAIN STEM W/O & W/DYE: CPT | Mod: 26

## 2023-05-23 RX ORDER — LEVOTHYROXINE SODIUM 125 MCG
1 TABLET ORAL
Qty: 0 | Refills: 0 | DISCHARGE
Start: 2023-05-23

## 2023-05-23 RX ORDER — AMLODIPINE BESYLATE 2.5 MG/1
1 TABLET ORAL
Qty: 0 | Refills: 0 | DISCHARGE

## 2023-05-23 RX ADMIN — Medication 2: at 14:24

## 2023-05-23 RX ADMIN — AMLODIPINE BESYLATE 5 MILLIGRAM(S): 2.5 TABLET ORAL at 08:59

## 2023-05-23 RX ADMIN — Medication 25 MICROGRAM(S): at 05:32

## 2023-05-23 RX ADMIN — HEPARIN SODIUM 5000 UNIT(S): 5000 INJECTION INTRAVENOUS; SUBCUTANEOUS at 05:32

## 2023-05-23 RX ADMIN — CLOPIDOGREL BISULFATE 75 MILLIGRAM(S): 75 TABLET, FILM COATED ORAL at 14:25

## 2023-05-23 RX ADMIN — Medication 81 MILLIGRAM(S): at 14:25

## 2023-05-23 NOTE — DISCHARGE NOTE NURSING/CASE MANAGEMENT/SOCIAL WORK - NSDCPEFALRISK_GEN_ALL_CORE
For information on Fall & Injury Prevention, visit: [email or web address] OR  [email or web address] OR  https://www.cdc.gov/steadi/patient.html

## 2023-05-23 NOTE — DISCHARGE NOTE PROVIDER - HOSPITAL COURSE
62 y/o F with PMH of HTN, DM 2, Dyslipidemia,  Hypothyroidism, Asthma, Nephrolithiasis s/p lithotomy, and GERD presented with left lower face numbness. Admitted to r/o TIA. CTH negative. On ASA and Plavix. MRI showed ___  Facial numbness resolved. Stable for discharge.    64 y/o F with PMH of HTN, DM 2, Dyslipidemia,  Hypothyroidism, Asthma, Nephrolithiasis s/p lithotomy, and GERD presented with left lower face numbness. Admitted to r/o TIA. CTH negative. On ASA and Plavix. MRI per neurology was normal.  Facial numbness resolved. Stable for discharge.

## 2023-05-23 NOTE — PROGRESS NOTE ADULT - REASON FOR ADMISSION
Face numbness.

## 2023-05-23 NOTE — PROGRESS NOTE ADULT - SUBJECTIVE AND OBJECTIVE BOX
Patient is a 63y old  Female who presents with a chief complaint of Face numbness. (19 May 2023 23:20)      SUBJECTIVE / OVERNIGHT EVENTS: pt seen and examined. no fever, no shob, NAD, no c/o pain        Vital Signs Last 24 Hrs  T(C): 36.8 (20 May 2023 08:05), Max: 37 (19 May 2023 21:14)  T(F): 98.2 (20 May 2023 08:05), Max: 98.6 (19 May 2023 21:14)  HR: 78 (20 May 2023 08:05) (67 - 100)  BP: 148/78 (20 May 2023 08:05) (130/79 - 162/82)  BP(mean): --  RR: 20 (20 May 2023 08:05) (18 - 20)  SpO2: 98% (20 May 2023 08:05) (97% - 99%)    Parameters below as of 20 May 2023 08:05  Patient On (Oxygen Delivery Method): room air      I&O's Summary      PHYSICAL EXAM:  GENERAL: NAD  HEAD:  Atraumatic, Normocephalic  NECK: Supple  CHEST/LUNG: CTABL  HEART: S1+S2  ABDOMEN: Soft, Nontender, Nondistended; Bowel sounds present      LABS:                        12.4   9.62  )-----------( 286      ( 20 May 2023 06:42 )             37.6     05-20    142  |  105  |  7   ----------------------------<  121<H>  3.8   |  26  |  0.47<L>    Ca    8.9      20 May 2023 06:42    TPro  8.4<H>  /  Alb  4.4  /  TBili  0.3  /  DBili  x   /  AST  23  /  ALT  28  /  AlkPhos  83  05-19    PT/INR - ( 19 May 2023 20:04 )   PT: 11.1 sec;   INR: 0.97 ratio         PTT - ( 19 May 2023 20:04 )  PTT:29.5 sec  CAPILLARY BLOOD GLUCOSE  132 (20 May 2023 06:03)      POCT Blood Glucose.: 153 mg/dL (20 May 2023 07:58)  POCT Blood Glucose.: 132 mg/dL (19 May 2023 20:08)            RADIOLOGY & ADDITIONAL TESTS:    Imaging Personally Reviewed:  [x] YES  [ ] NO    Consultant(s) Notes Reviewed:  [x] YES  [ ] NO      MEDICATIONS  (STANDING):  amLODIPine   Tablet 5 milliGRAM(s) Oral daily  aspirin enteric coated 81 milliGRAM(s) Oral daily  clopidogrel Tablet 75 milliGRAM(s) Oral daily  dextrose 5%. 1000 milliLiter(s) (50 mL/Hr) IV Continuous <Continuous>  dextrose 5%. 1000 milliLiter(s) (100 mL/Hr) IV Continuous <Continuous>  dextrose 50% Injectable 12.5 Gram(s) IV Push once  dextrose 50% Injectable 25 Gram(s) IV Push once  dextrose 50% Injectable 25 Gram(s) IV Push once  glucagon  Injectable 1 milliGRAM(s) IntraMuscular once  heparin   Injectable 5000 Unit(s) SubCutaneous every 8 hours  insulin lispro (ADMELOG) corrective regimen sliding scale   SubCutaneous three times a day before meals  insulin lispro (ADMELOG) corrective regimen sliding scale   SubCutaneous at bedtime  levothyroxine 25 MICROGram(s) Oral daily  metoprolol succinate ER 25 milliGRAM(s) Oral daily    MEDICATIONS  (PRN):  dextrose Oral Gel 15 Gram(s) Oral once PRN Blood Glucose LESS THAN 70 milliGRAM(s)/deciliter      Care Discussed with Consultants/Other Providers [x] YES  [ ] NO    HEALTH ISSUES - PROBLEM Dx:  TIA (transient ischemic attack)    DM2 (diabetes mellitus, type 2)    HTN (hypertension)    Hypothyroidism    Need for prophylactic measure        
Gabriele Calvo M.D.    Patient is a 63y old  Female who presents with a chief complaint of Face numbness. (22 May 2023 12:55)      SUBJECTIVE / OVERNIGHT EVENTS: Left facial numbness resolved. no other concerns.     Patient denies chest pain, SOB, abd pain, N/V, fever, chills, dysuria or any other complaints. All remainder ROS negative.     MEDICATIONS  (STANDING):  amLODIPine   Tablet 5 milliGRAM(s) Oral daily  aspirin enteric coated 81 milliGRAM(s) Oral daily  clopidogrel Tablet 75 milliGRAM(s) Oral daily  dextrose 5%. 1000 milliLiter(s) (100 mL/Hr) IV Continuous <Continuous>  dextrose 5%. 1000 milliLiter(s) (50 mL/Hr) IV Continuous <Continuous>  dextrose 50% Injectable 25 Gram(s) IV Push once  dextrose 50% Injectable 12.5 Gram(s) IV Push once  dextrose 50% Injectable 25 Gram(s) IV Push once  glucagon  Injectable 1 milliGRAM(s) IntraMuscular once  heparin   Injectable 5000 Unit(s) SubCutaneous every 8 hours  insulin lispro (ADMELOG) corrective regimen sliding scale   SubCutaneous three times a day before meals  insulin lispro (ADMELOG) corrective regimen sliding scale   SubCutaneous at bedtime  levothyroxine 25 MICROGram(s) Oral daily  metoprolol succinate ER 25 milliGRAM(s) Oral daily    MEDICATIONS  (PRN):  dextrose Oral Gel 15 Gram(s) Oral once PRN Blood Glucose LESS THAN 70 milliGRAM(s)/deciliter      I&O's Summary      PHYSICAL EXAM:  Vital Signs Last 24 Hrs  T(C): 36.7 (23 May 2023 08:01), Max: 36.8 (22 May 2023 23:15)  T(F): 98 (23 May 2023 08:01), Max: 98.2 (22 May 2023 23:15)  HR: 71 (23 May 2023 08:01) (71 - 78)  BP: 137/82 (23 May 2023 08:01) (123/76 - 137/82)  BP(mean): --  RR: 18 (23 May 2023 08:01) (16 - 18)  SpO2: 97% (23 May 2023 08:01) (95% - 98%)    Parameters below as of 23 May 2023 08:01  Patient On (Oxygen Delivery Method): room air        CONSTITUTIONAL: NAD, well-groomed  ENMT: Moist oral mucosa, no pharyngeal injection or exudates; normal dentition  RESPIRATORY: Normal respiratory effort; lungs are clear to auscultation bilaterally  CARDIOVASCULAR: Regular rate and rhythm; No lower extremity edema  ABDOMEN: Nontender to palpation, normoactive bowel sounds  PSYCH: A+O x3; affect appropriate  NEUROLOGY: CN 2-12 are intact and symmetric; no gross sensory deficits;   SKIN: No rashes; no palpable lesions    LABS:    CAPILLARY BLOOD GLUCOSE      POCT Blood Glucose.: 121 mg/dL (23 May 2023 07:58)  POCT Blood Glucose.: 107 mg/dL (22 May 2023 21:27)  POCT Blood Glucose.: 217 mg/dL (22 May 2023 16:11)      RADIOLOGY & ADDITIONAL TESTS:  Results Reviewed:   Imaging Personally Reviewed:  Electrocardiogram Personally Reviewed:
Patient is a 63y old  Female who presents with a chief complaint of Face numbness. (20 May 2023 09:26)    HPI: This is a 62 y/o F with PMH of HTN, DM 2, Dyslipidemia,  Hypothyroidism, Asthma, Nephrolithiasis s/p lithotomy, and GERD who presented with left lower face numbness started around 2 pm 5/19 was on for about 10 minutes, then resolved & was replaced by some "pressure" sensation involving the left half of the head including left eye & left side of the face, believes she was light headed with blurry vision, denies any focal muscle weakness or numbness anywhere else in her body, no associated speech or gait abnormality. Patient checked her blood pressure several times, and believes it went up when she got the face numbness, 's, then back to normal when she took an extra dose of Amlodipine 5 mg. Of note that she got a similar episode3 days ago, and about 4 years ago. Currently patient denies having any symptoms. (19 May 2023 23:20)      Interval history -     No new complains.  Facial numbness is resolved.     S/P LASIK surgery  left eye    H/O lithotripsy    MEDICATIONS  (STANDING):    amLODIPine   Tablet 5 milliGRAM(s) Oral daily  aspirin enteric coated 81 milliGRAM(s) Oral daily  clopidogrel Tablet 75 milliGRAM(s) Oral daily  dextrose 5%. 1000 milliLiter(s) (50 mL/Hr) IV Continuous <Continuous>  dextrose 5%. 1000 milliLiter(s) (100 mL/Hr) IV Continuous <Continuous>  dextrose 50% Injectable 25 Gram(s) IV Push once  dextrose 50% Injectable 25 Gram(s) IV Push once  dextrose 50% Injectable 12.5 Gram(s) IV Push once  glucagon  Injectable 1 milliGRAM(s) IntraMuscular once  heparin   Injectable 5000 Unit(s) SubCutaneous every 8 hours  insulin lispro (ADMELOG) corrective regimen sliding scale   SubCutaneous three times a day before meals  insulin lispro (ADMELOG) corrective regimen sliding scale   SubCutaneous at bedtime  levothyroxine 25 MICROGram(s) Oral daily  metoprolol succinate ER 25 milliGRAM(s) Oral daily    MEDICATIONS  (PRN):    dextrose Oral Gel 15 Gram(s) Oral once PRN Blood Glucose LESS THAN 70 milliGRAM(s)/deciliter    Allergies    Cipro (Unknown)    SOCIAL HISTORY:    No h/o Smoking.   No h/o alcohol use.    FAMILY HISTORY:    Family history of asthma (Mother)    REVIEW OF SYSTEMS:    CONSTITUTIONAL: No fever  EYES: No eye pain,   ENMT:  No sinus or throat pain  NECK: No pain or stiffness  RESPIRATORY: No cough, No hemoptysis; No shortness of breath  CARDIOVASCULAR: No acute chest pain, palpitations,  or leg swelling  GASTROINTESTINAL: No abdominal pain. No nausea, vomiting, or hematemesis;  No melena or hematochezia.  GENITOURINARY: No  hematuria, or incontinence  MUSCULOSKELETAL: No joint swelling; No extremity pain  SKIN: No itching, rashes, or lesions   LYMPH NODES: No enlarged glands  NEUROLOGICAL: No headaches, memory loss,   PSYCHIATRIC: No depression, anxiety, mood swings, or difficulty sleeping  ENDOCRINE: No heat or cold intolerance;   HEME/LYMPH: No easy bruising, or bleeding gums  Allergy/Immunology. No medication allergy.    PHYSICAL EXAM:    Vital Signs Last 24 Hrs  T(C): 36.5 (22 May 2023 08:49), Max: 37.2 (21 May 2023 17:10)  T(F): 97.7 (22 May 2023 08:49), Max: 98.9 (21 May 2023 17:10)  HR: 68 (22 May 2023 08:49) (61 - 74)  BP: 127/77 (22 May 2023 08:49) (127/77 - 146/87)  RR: 17 (22 May 2023 08:49) (17 - 18)  SpO2: 97% (22 May 2023 08:49) (95% - 97%)    Parameters below as of 22 May 2023 08:49  Patient On (Oxygen Delivery Method): room air    GENERAL: NAD, well-groomed, well-developed  HEAD:  Atraumatic, Normocephalic  EYES: EOMI, PERRLA, conjunctiva and sclera clear  NECK: Supple, No JVD    On Neurological Examination:    Mental Status - Pt is alert, awake, oriented X3. Higher functions are intact. Follows commands well and able to answer questions appropriately.    Speech -  Normal. Pt has no aphasia.    Cranial Nerves - Pupils 3 mm equal and reactive to light, extraocular eye movements intact. Pt has no visual field deficit.  No facial asymmetry. Tongue - is in midline.    Motor Exam - 4 plus/5 all over, No drift. No shaking or tremors.    Sensory Exam - Pin prick, temperature, joint position and vibration are intact on either side.     Gait - Able to stand and walk unassisted.    Deep tendon Reflexes - 2 plus all over.    Coordination - Fine finger movements are normal on both sides. Finger to nose is also normal on both sides.       Romberg - Negative.    Neck Supple -  Yes.    LABS:                         12.4   9.62  )-----------( 286      ( 20 May 2023 06:42 )             37.6     05-20    142  |  105  |  7   ----------------------------<  121<H>  3.8   |  26  |  0.47<L>    Ca    8.9      20 May 2023 06:42    TPro  8.4<H>  /  Alb  4.4  /  TBili  0.3  /  DBili  x   /  AST  23  /  ALT  28  /  AlkPhos  83  05-19    PT/INR - ( 19 May 2023 20:04 )   PT: 11.1 sec;   INR: 0.97 ratio      PTT - ( 19 May 2023 20:04 )  PTT:29.5 sec    RADIOLOGY & ADDITIONAL STUDIES:    < from: US Transthoracic Echocardiogram w/Doppler Complete (05.20.23 @ 07:49) >    IMPRESSION:    1. Normal left ventricular systolic function.    < end of copied text >      < from: CT Brain Stroke Protocol (05.19.23 @ 20:30) >    -No acute transcortical infarct or intracranial hemorrhage.    < end of copied text >    < from: CT Angio Neck Stroke Protocol w/ IV Cont (05.19.23 @ 20:37) >    IMPRESSION:    CT angiogram head:    -No vaso-occlusive disease.    CT angiogram neck:    -No vaso-occlusive disease.    < end of copied text >  
Patient is a 63y old  Female who presents with a chief complaint of Face numbness. (22 May 2023 12:39)      SUBJECTIVE / OVERNIGHT EVENTS: pt seen and examined. no fever, no shob, NAD, no c/o pain        Vital Signs Last 24 Hrs  T(C): 36.5 (22 May 2023 08:49), Max: 37.2 (21 May 2023 17:10)  T(F): 97.7 (22 May 2023 08:49), Max: 98.9 (21 May 2023 17:10)  HR: 68 (22 May 2023 08:49) (61 - 74)  BP: 127/77 (22 May 2023 08:49) (127/77 - 146/87)  BP(mean): --  RR: 17 (22 May 2023 08:49) (17 - 18)  SpO2: 97% (22 May 2023 08:49) (95% - 97%)    Parameters below as of 22 May 2023 08:49  Patient On (Oxygen Delivery Method): room air      I&O's Summary      PHYSICAL EXAM:  GENERAL: NAD  HEAD:  Atraumatic, Normocephalic  NECK: Supple  CHEST/LUNG: CTABL  HEART: S1+S2  ABDOMEN: Soft, Nontender, Nondistended; Bowel sounds present  Neuro: no focal deficit  EXTREMITIES:  No edema  SKIN: No rashes or lesions    LABS:            CAPILLARY BLOOD GLUCOSE      POCT Blood Glucose.: 121 mg/dL (22 May 2023 07:50)  POCT Blood Glucose.: 122 mg/dL (21 May 2023 21:07)  POCT Blood Glucose.: 113 mg/dL (21 May 2023 17:01)            RADIOLOGY & ADDITIONAL TESTS:    Imaging Personally Reviewed:  [x] YES  [ ] NO    Consultant(s) Notes Reviewed:  [x] YES  [ ] NO      MEDICATIONS  (STANDING):  amLODIPine   Tablet 5 milliGRAM(s) Oral daily  aspirin enteric coated 81 milliGRAM(s) Oral daily  clopidogrel Tablet 75 milliGRAM(s) Oral daily  dextrose 5%. 1000 milliLiter(s) (50 mL/Hr) IV Continuous <Continuous>  dextrose 5%. 1000 milliLiter(s) (100 mL/Hr) IV Continuous <Continuous>  dextrose 50% Injectable 25 Gram(s) IV Push once  dextrose 50% Injectable 25 Gram(s) IV Push once  dextrose 50% Injectable 12.5 Gram(s) IV Push once  glucagon  Injectable 1 milliGRAM(s) IntraMuscular once  heparin   Injectable 5000 Unit(s) SubCutaneous every 8 hours  insulin lispro (ADMELOG) corrective regimen sliding scale   SubCutaneous at bedtime  insulin lispro (ADMELOG) corrective regimen sliding scale   SubCutaneous three times a day before meals  levothyroxine 25 MICROGram(s) Oral daily  metoprolol succinate ER 25 milliGRAM(s) Oral daily    MEDICATIONS  (PRN):  dextrose Oral Gel 15 Gram(s) Oral once PRN Blood Glucose LESS THAN 70 milliGRAM(s)/deciliter      Care Discussed with Consultants/Other Providers [x] YES  [ ] NO    HEALTH ISSUES - PROBLEM Dx:  TIA (transient ischemic attack)    DM2 (diabetes mellitus, type 2)    HTN (hypertension)    Hypothyroidism    Need for prophylactic measure        
Patient is a 63y old  Female who presents with a chief complaint of Face numbness. (20 May 2023 09:26)    HPI: This is a 62 y/o F with PMH of HTN, DM 2, Dyslipidemia,  Hypothyroidism, Asthma, Nephrolithiasis s/p lithotomy, and GERD who presented with left lower face numbness started around 2 pm 5/19 was on for about 10 minutes, then resolved & was replaced by some "pressure" sensation involving the left half of the head including left eye & left side of the face, believes she was light headed with blurry vision, denies any focal muscle weakness or numbness anywhere else in her body, no associated speech or gait abnormality. Patient checked her blood pressure several times, and believes it went up when she got the face numbness, 's, then back to normal when she took an extra dose of Amlodipine 5 mg. Of note that she got a similar episode3 days ago, and about 4 years ago. Currently patient denies having any symptoms. (19 May 2023 23:20)      Interval history -     No new complains.  Facial numbness is resolved.     S/P LASIK surgery  left eye    H/O lithotripsy    MEDICATIONS  (STANDING):    amLODIPine   Tablet 5 milliGRAM(s) Oral daily  aspirin enteric coated 81 milliGRAM(s) Oral daily  clopidogrel Tablet 75 milliGRAM(s) Oral daily  dextrose 5%. 1000 milliLiter(s) (50 mL/Hr) IV Continuous <Continuous>  dextrose 5%. 1000 milliLiter(s) (100 mL/Hr) IV Continuous <Continuous>  dextrose 50% Injectable 25 Gram(s) IV Push once  dextrose 50% Injectable 12.5 Gram(s) IV Push once  dextrose 50% Injectable 25 Gram(s) IV Push once  glucagon  Injectable 1 milliGRAM(s) IntraMuscular once  heparin   Injectable 5000 Unit(s) SubCutaneous every 8 hours  insulin lispro (ADMELOG) corrective regimen sliding scale   SubCutaneous three times a day before meals  insulin lispro (ADMELOG) corrective regimen sliding scale   SubCutaneous at bedtime  levothyroxine 25 MICROGram(s) Oral daily  metoprolol succinate ER 25 milliGRAM(s) Oral daily    MEDICATIONS  (PRN):    dextrose Oral Gel 15 Gram(s) Oral once PRN Blood Glucose LESS THAN 70 milliGRAM(s)/deciliter    Allergies    Cipro (Unknown)    SOCIAL HISTORY:    No h/o Smoking.   No h/o alcohol use.    FAMILY HISTORY:    Family history of asthma (Mother)    REVIEW OF SYSTEMS:    CONSTITUTIONAL: No fever  EYES: No eye pain,   ENMT:  No sinus or throat pain  NECK: No pain or stiffness  RESPIRATORY: No cough, No hemoptysis; No shortness of breath  CARDIOVASCULAR: No acute chest pain, palpitations,  or leg swelling  GASTROINTESTINAL: No abdominal pain. No nausea, vomiting, or hematemesis;  No melena or hematochezia.  GENITOURINARY: No  hematuria, or incontinence  MUSCULOSKELETAL: No joint swelling; No extremity pain  SKIN: No itching, rashes, or lesions   LYMPH NODES: No enlarged glands  NEUROLOGICAL: No headaches, memory loss,   PSYCHIATRIC: No depression, anxiety, mood swings, or difficulty sleeping  ENDOCRINE: No heat or cold intolerance;   HEME/LYMPH: No easy bruising, or bleeding gums  Allergy/Immunology. No medication allergy.    PHYSICAL EXAM:    Vital Signs Last 24 Hrs    T(C): 36.7 (05-23-23 @ 08:01), Max: 36.8 (05-22-23 @ 23:15)  T(F): 98 (05-23-23 @ 08:01), Max: 98.2 (05-22-23 @ 23:15)  HR: 71 (05-23-23 @ 08:01) (71 - 78)  BP: 137/82 (05-23-23 @ 08:01) (123/76 - 137/82)  BP(mean): --  RR: 18 (05-23-23 @ 08:01) (16 - 18)  SpO2: 97% (05-23-23 @ 08:01) (95% - 97%)    GENERAL: NAD, well-groomed, well-developed  HEAD:  Atraumatic, Normocephalic  EYES: EOMI, PERRLA, conjunctiva and sclera clear  NECK: Supple, No JVD    On Neurological Examination:    Mental Status - Pt is alert, awake, oriented X3. Higher functions are intact. Follows commands well and able to answer questions appropriately.    Speech -  Normal. Pt has no aphasia.    Cranial Nerves - Pupils 3 mm equal and reactive to light, extraocular eye movements intact. Pt has no visual field deficit.  No facial asymmetry. Tongue - is in midline.    Motor Exam - 4 plus/5 all over, No drift. No shaking or tremors.    Sensory Exam - Pin prick, temperature, joint position and vibration are intact on either side.     Gait - Able to stand and walk unassisted.    Deep tendon Reflexes - 2 plus all over.    Coordination - Fine finger movements are normal on both sides. Finger to nose is also normal on both sides.       Romberg - Negative.    Neck Supple -  Yes.    LABS:                         12.4   9.62  )-----------( 286      ( 20 May 2023 06:42 )             37.6     05-20    142  |  105  |  7   ----------------------------<  121<H>  3.8   |  26  |  0.47<L>    Ca    8.9      20 May 2023 06:42    TPro  8.4<H>  /  Alb  4.4  /  TBili  0.3  /  DBili  x   /  AST  23  /  ALT  28  /  AlkPhos  83  05-19    PT/INR - ( 19 May 2023 20:04 )   PT: 11.1 sec;   INR: 0.97 ratio      PTT - ( 19 May 2023 20:04 )  PTT:29.5 sec    RADIOLOGY & ADDITIONAL STUDIES:    ACC: [id number] EXAM:  MR BRAIN WAW IC   ORDERED BY: MARY SIERRA   ACC: [id number] EXAM:  MR SELLA ONLY WAW IC   ORDERED BY: MARY SIERRA       IMPRESSION:     No abnormal pituitary lesions are identified.    No acute hemorrhage, mass. mass effect or abnormal enhancement involving the posterior fossa or supratentorial region.    < end of copied text >        < from: US Transthoracic Echocardiogram w/Doppler Complete (05.20.23 @ 07:49) >    IMPRESSION:    1. Normal left ventricular systolic function.    < end of copied text >      < from: CT Brain Stroke Protocol (05.19.23 @ 20:30) >    -No acute transcortical infarct or intracranial hemorrhage.    < end of copied text >    < from: CT Angio Neck Stroke Protocol w/ IV Cont (05.19.23 @ 20:37) >    IMPRESSION:    CT angiogram head:    -No vaso-occlusive disease.    CT angiogram neck:    -No vaso-occlusive disease.    < end of copied text >  
Patient is a 63y old  Female who presents with a chief complaint of Face numbness. (20 May 2023 09:26)    HPI: This is a 62 y/o F with PMH of HTN, DM 2, Dyslipidemia,  Hypothyroidism, Asthma, Nephrolithiasis s/p lithotomy, and GERD who presented with left lower face numbness started around 2 pm 5/19 was on for about 10 minutes, then resolved & was replaced by some "pressure" sensation involving the left half of the head including left eye & left side of the face, believes she was light headed with blurry vision, denies any focal muscle weakness or numbness anywhere else in her body, no associated speech or gait abnormality. Patient checked her blood pressure several times, and believes it went up when she got the face numbness, 's, then back to normal when she took an extra dose of Amlodipine 5 mg. Of note that she got a similar episode3 days ago, and about 4 years ago. Currently patient denies having any symptoms. (19 May 2023 23:20)      Interval history -     No new complains.  Facial numbness is resolved.     S/P LASIK surgery  left eye    H/O lithotripsy    MEDICATIONS  (STANDING):    amLODIPine   Tablet 5 milliGRAM(s) Oral daily  aspirin enteric coated 81 milliGRAM(s) Oral daily  clopidogrel Tablet 75 milliGRAM(s) Oral daily  dextrose 5%. 1000 milliLiter(s) (50 mL/Hr) IV Continuous <Continuous>  dextrose 5%. 1000 milliLiter(s) (100 mL/Hr) IV Continuous <Continuous>  dextrose 50% Injectable 25 Gram(s) IV Push once  dextrose 50% Injectable 25 Gram(s) IV Push once  dextrose 50% Injectable 12.5 Gram(s) IV Push once  glucagon  Injectable 1 milliGRAM(s) IntraMuscular once  heparin   Injectable 5000 Unit(s) SubCutaneous every 8 hours  insulin lispro (ADMELOG) corrective regimen sliding scale   SubCutaneous three times a day before meals  insulin lispro (ADMELOG) corrective regimen sliding scale   SubCutaneous at bedtime  levothyroxine 25 MICROGram(s) Oral daily  metoprolol succinate ER 25 milliGRAM(s) Oral daily    MEDICATIONS  (PRN):    dextrose Oral Gel 15 Gram(s) Oral once PRN Blood Glucose LESS THAN 70 milliGRAM(s)/deciliter    Allergies    Cipro (Unknown)    SOCIAL HISTORY:    No h/o Smoking.   No h/o alcohol use.    FAMILY HISTORY:    Family history of asthma (Mother)    REVIEW OF SYSTEMS:    CONSTITUTIONAL: No fever  EYES: No eye pain,   ENMT:  No sinus or throat pain  NECK: No pain or stiffness  RESPIRATORY: No cough, No hemoptysis; No shortness of breath  CARDIOVASCULAR: No acute chest pain, palpitations,  or leg swelling  GASTROINTESTINAL: No abdominal pain. No nausea, vomiting, or hematemesis;  No melena or hematochezia.  GENITOURINARY: No  hematuria, or incontinence  MUSCULOSKELETAL: No joint swelling; No extremity pain  SKIN: No itching, rashes, or lesions   LYMPH NODES: No enlarged glands  NEUROLOGICAL: No headaches, memory loss,   PSYCHIATRIC: No depression, anxiety, mood swings, or difficulty sleeping  ENDOCRINE: No heat or cold intolerance;   HEME/LYMPH: No easy bruising, or bleeding gums  Allergy/Immunology. No medication allergy. No seasonal allergies.    PHYSICAL EXAM:    Vital Signs Last 24 Hrs  T(C): 36.7 (21 May 2023 08:12), Max: 36.9 (20 May 2023 16:16)  T(F): 98 (21 May 2023 08:12), Max: 98.4 (20 May 2023 16:16)  HR: 58 (21 May 2023 08:12) (58 - 80)  BP: 124/74 (21 May 2023 08:12) (109/69 - 128/68)  BP(mean): --  RR: 18 (21 May 2023 08:12) (18 - 20)  SpO2: 97% (21 May 2023 08:12) (96% - 97%)    Parameters below as of 21 May 2023 08:12  Patient On (Oxygen Delivery Method): room air    GENERAL: NAD, well-groomed, well-developed  HEAD:  Atraumatic, Normocephalic  EYES: EOMI, PERRLA, conjunctiva and sclera clear  NECK: Supple, No JVD    On Neurological Examination:    Mental Status - Pt is alert, awake, oriented X3. Higher functions are intact. Follows commands well and able to answer questions appropriately.    Speech -  Normal. Pt has no aphasia.    Cranial Nerves - Pupils 3 mm equal and reactive to light, extraocular eye movements intact. Pt has no visual field deficit.  No facial asymmetry. Tongue - is in midline.    Motor Exam - 4 plus/5 all over, No drift. No shaking or tremors.    Sensory Exam - Pin prick, temperature, joint position and vibration are intact on either side.     Gait - Able to stand and walk unassisted.    Deep tendon Reflexes - 2 plus all over.    Coordination - Fine finger movements are normal on both sides. Finger to nose is also normal on both sides.       Romberg - Negative.    Neck Supple -  Yes.    LABS:                         12.4   9.62  )-----------( 286      ( 20 May 2023 06:42 )             37.6     05-20    142  |  105  |  7   ----------------------------<  121<H>  3.8   |  26  |  0.47<L>    Ca    8.9      20 May 2023 06:42    TPro  8.4<H>  /  Alb  4.4  /  TBili  0.3  /  DBili  x   /  AST  23  /  ALT  28  /  AlkPhos  83  05-19    PT/INR - ( 19 May 2023 20:04 )   PT: 11.1 sec;   INR: 0.97 ratio      PTT - ( 19 May 2023 20:04 )  PTT:29.5 sec    RADIOLOGY & ADDITIONAL STUDIES:    < from: CT Brain Stroke Protocol (05.19.23 @ 20:30) >    -No acute transcortical infarct or intracranial hemorrhage.    < end of copied text >    < from: CT Angio Neck Stroke Protocol w/ IV Cont (05.19.23 @ 20:37) >    IMPRESSION:    CT angiogram head:    -No vaso-occlusive disease.    CT angiogram neck:    -No vaso-occlusive disease.    < end of copied text >  
Patient is a 63y old  Female who presents with a chief complaint of Face numbness. (20 May 2023 11:26)      SUBJECTIVE / OVERNIGHT EVENTS: pt seen and examined. no fever, no shob, NAD, no c/o pain        Vital Signs Last 24 Hrs  T(C): 36.7 (21 May 2023 08:12), Max: 36.9 (20 May 2023 16:16)  T(F): 98 (21 May 2023 08:12), Max: 98.4 (20 May 2023 16:16)  HR: 58 (21 May 2023 08:12) (58 - 80)  BP: 124/74 (21 May 2023 08:12) (109/69 - 128/68)  BP(mean): --  RR: 18 (21 May 2023 08:12) (18 - 20)  SpO2: 97% (21 May 2023 08:12) (96% - 97%)    Parameters below as of 21 May 2023 08:12  Patient On (Oxygen Delivery Method): room air      I&O's Summary    20 May 2023 07:01  -  21 May 2023 07:00  --------------------------------------------------------  IN: 400 mL / OUT: 0 mL / NET: 400 mL        PHYSICAL EXAM:  GENERAL: NAD  HEAD:  Atraumatic, Normocephalic  NECK: Supple  CHEST/LUNG: CTABL  HEART: S1+S2  ABDOMEN: Soft, Nontender, Nondistended; Bowel sounds present  Neuro: no focal deficit  EXTREMITIES:  No edema  SKIN: No rashes or lesions    LABS:                        12.4   9.62  )-----------( 286      ( 20 May 2023 06:42 )             37.6     05-20    142  |  105  |  7   ----------------------------<  121<H>  3.8   |  26  |  0.47<L>    Ca    8.9      20 May 2023 06:42    TPro  8.4<H>  /  Alb  4.4  /  TBili  0.3  /  DBili  x   /  AST  23  /  ALT  28  /  AlkPhos  83  05-19    PT/INR - ( 19 May 2023 20:04 )   PT: 11.1 sec;   INR: 0.97 ratio         PTT - ( 19 May 2023 20:04 )  PTT:29.5 sec  CAPILLARY BLOOD GLUCOSE      POCT Blood Glucose.: 115 mg/dL (21 May 2023 08:05)  POCT Blood Glucose.: 102 mg/dL (20 May 2023 21:45)  POCT Blood Glucose.: 114 mg/dL (20 May 2023 17:05)  POCT Blood Glucose.: 93 mg/dL (20 May 2023 12:10)            RADIOLOGY & ADDITIONAL TESTS:    Imaging Personally Reviewed:  [x] YES  [ ] NO    Consultant(s) Notes Reviewed:  [x] YES  [ ] NO      MEDICATIONS  (STANDING):  amLODIPine   Tablet 5 milliGRAM(s) Oral daily  aspirin enteric coated 81 milliGRAM(s) Oral daily  clopidogrel Tablet 75 milliGRAM(s) Oral daily  dextrose 5%. 1000 milliLiter(s) (50 mL/Hr) IV Continuous <Continuous>  dextrose 5%. 1000 milliLiter(s) (100 mL/Hr) IV Continuous <Continuous>  dextrose 50% Injectable 25 Gram(s) IV Push once  dextrose 50% Injectable 25 Gram(s) IV Push once  dextrose 50% Injectable 12.5 Gram(s) IV Push once  glucagon  Injectable 1 milliGRAM(s) IntraMuscular once  heparin   Injectable 5000 Unit(s) SubCutaneous every 8 hours  insulin lispro (ADMELOG) corrective regimen sliding scale   SubCutaneous at bedtime  insulin lispro (ADMELOG) corrective regimen sliding scale   SubCutaneous three times a day before meals  levothyroxine 25 MICROGram(s) Oral daily  metoprolol succinate ER 25 milliGRAM(s) Oral daily    MEDICATIONS  (PRN):  dextrose Oral Gel 15 Gram(s) Oral once PRN Blood Glucose LESS THAN 70 milliGRAM(s)/deciliter      Care Discussed with Consultants/Other Providers [x] YES  [ ] NO    HEALTH ISSUES - PROBLEM Dx:  TIA (transient ischemic attack)    DM2 (diabetes mellitus, type 2)    HTN (hypertension)    Hypothyroidism    Need for prophylactic measure

## 2023-05-23 NOTE — DISCHARGE NOTE PROVIDER - NSDCFUSCHEDAPPT_GEN_ALL_CORE_FT
[facility]  INTMED [address]  Scheduled Appointment: 05/25/2023    Mary Pradhan  [facility]  PULMMED [address]  Scheduled Appointment: 08/04/2023

## 2023-05-23 NOTE — DISCHARGE NOTE PROVIDER - NSDCMRMEDTOKEN_GEN_ALL_CORE_FT
amLODIPine 5 mg oral tablet: 1 orally  aspirin 81 mg oral tablet, chewable: 1 tab(s) orally once a day  levothyroxine 25 mcg:   metFORMIN 500 mg oral tablet, extended release: 1 tab(s) orally once a day  metoprolol succinate 25 mg oral tablet, extended release: 1 tab(s) orally once a day    amLODIPine 5 mg oral tablet: 1 tablet orally once a day  aspirin 81 mg oral tablet, chewable: 1 tab(s) orally once a day  levothyroxine 25 mcg (0.025 mg) oral tablet: 1 tab(s) orally once a day  metFORMIN 500 mg oral tablet, extended release: 1 tab(s) orally once a day  metoprolol succinate 25 mg oral tablet, extended release: 1 tab(s) orally once a day

## 2023-05-23 NOTE — DISCHARGE NOTE PROVIDER - ATTENDING DISCHARGE PHYSICAL EXAMINATION:
VITALS:   T(C): 36.7 (05-23-23 @ 08:01), Max: 36.8 (05-22-23 @ 23:15)  HR: 71 (05-23-23 @ 08:01) (71 - 78)  BP: 137/82 (05-23-23 @ 08:01) (123/76 - 137/82)  RR: 18 (05-23-23 @ 08:01) (16 - 18)  SpO2: 97% (05-23-23 @ 08:01) (95% - 98%)    GENERAL: NAD, lying in bed comfortably  HEAD:  Atraumatic, Normocephalic  EYES: EOMI, PERRLA, conjunctiva and sclera clear  ENT: Moist mucous membranes  NECK: Supple, No JVD  CHEST/LUNG: Clear to auscultation bilaterally; No rales, rhonchi, wheezing, or rubs. Unlabored respirations  HEART: Regular rate and rhythm; No murmurs, rubs, or gallops  ABDOMEN: BSx4; Soft, nontender, nondistended  EXTREMITIES:  2+ Peripheral Pulses, brisk capillary refill. No clubbing, cyanosis, or edema  NERVOUS SYSTEM:  A&Ox3, no focal deficits   SKIN: No rashes or lesions  PSYCH: Normal affect, euthymic mood

## 2023-05-23 NOTE — DISCHARGE NOTE NURSING/CASE MANAGEMENT/SOCIAL WORK - PATIENT PORTAL LINK FT
You can access the FollowMyHealth Patient Portal offered by [facility] by registering at the following website: [email or web address]. By joining [employer]’s FollowMyHealth portal, you will also be able to view your health information using other applications (apps) compatible with our system.

## 2023-05-23 NOTE — PROGRESS NOTE ADULT - ASSESSMENT
62 y/o F with PMH of HTN, DM 2, Dyslipidemia,  Hypothyroidism, Asthma, Nephrolithiasis s/p lithotomy, and GERD who presented with left lower face numbnes  Symptoms resolved.  D/D  Hypertensive urgency  TIA  CVA   NIHSS - 0  CT Head - No acute pathology.  CTA head and neck - No LVOs.  Admitted to monitored bed.  Ecotrin 81/Plavix 75  Lipitor 40  MRI Brain /Pituitary pre/post with gado are pending.  2D Echo.  LDL - 86  HbA1c - 6.9  Passed dysphagia eval in ED.  D/w Pt/  at bedside. Questions answered.  Would follow.    
64 y/o F with PMH of HTN, DM 2, Dyslipidemia,  Hypothyroidism, Asthma, Nephrolithiasis s/p lithotomy, and GERD presented with left lower face numbness.       Problem/Plan - 1:  ·  Problem: TIA (transient ischemic attack).   ·  Plan: r/o CVA, facial numbness on & off, currently asymptomatic  continue on low dose Aspirin after one full dose by ED team, also continue on Plavix 75 mg daily after a loading dose by ED team   -MRI brain pending  - neurology consult appreciated     Problem/Plan - 2:  ·  Problem: DM2 (diabetes mellitus, type 2).   ·  Plan: controlled, hold Metformin for 48 hours as she received IV contrast, Patient &  both understand, started her on corrective insulin Lispro scale coverage before meals & at bedtime, check glycohemoglobin level in am.     Problem/Plan - 3:  ·  Problem: HTN (hypertension).   ·  Plan: controlled, continue Metoprolol succinate & Losartan with hold parameters.    problem 4: Hypertyhyroidism  - check t4     Problem/Plan - 4:  ·  Problem: Hypothyroidism.   ·  Plan: on Levothyroxine 25 mcg PO daily, continue at same dose, check TSH level in am.     Problem/Plan - 5:  ·  Problem: Need for prophylactic measure.   ·  Plan: started her on UFH 5000 units sub Q every 8h for DVT prophylaxis, also continue on her low dose Aspirin, in addition to maintenance dose Plavix, discussed with patient &  at the bedside, they understand & agree.          
64 y/o F with PMH of HTN, DM 2, Dyslipidemia,  Hypothyroidism, Asthma, Nephrolithiasis s/p lithotomy, and GERD presented with left lower face numbness.       Problem/Plan - 1:  ·  Problem: TIA (transient ischemic attack).   ·  Plan: r/o CVA, facial numbness on & off, currently asymptomatic  continue on low dose Aspirin after one full dose by ED team, also continue on Plavix 75 mg daily after a loading dose by ED team   -MRI brain pending  - neurology consult appreciated     Problem/Plan - 2:  ·  Problem: DM2 (diabetes mellitus, type 2).   ·  Plan: controlled, hold Metformin for 48 hours as she received IV contrast, Patient &  both understand, started her on corrective insulin Lispro scale coverage before meals & at bedtime, check glycohemoglobin level in am.     Problem/Plan - 3:  ·  Problem: HTN (hypertension).   ·  Plan: controlled, continue Metoprolol succinate & Losartan with hold parameters.    problem 4: Hypertyhyroidism  - check t4     Problem/Plan - 4:  ·  Problem: Hypothyroidism.   ·  Plan: on Levothyroxine 25 mcg PO daily, continue at same dose, check TSH level in am.     Problem/Plan - 5:  ·  Problem: Need for prophylactic measure.   ·  Plan: started her on UFH 5000 units sub Q every 8h for DVT prophylaxis, also continue on her low dose Aspirin, in addition to maintenance dose Plavix, discussed with patient &  at the bedside, they understand & agree.          
64 y/o F with PMH of HTN, DM 2, Dyslipidemia,  Hypothyroidism, Asthma, Nephrolithiasis s/p lithotomy, and GERD presented with left lower face numbness.    #TIA (transient ischemic attack).   -r/o CVA, facial numbness on & off, currently asymptomatic  continue on low dose Aspirin after one full dose by ED team, also continue on Plavix 75 mg daily after a loading dose by ED team   -MRI brain pending  -neurology consult appreciated    #DM2 (diabetes mellitus, type 2).   -Controlled, hold Metformin for 48 hours as she received IV contrast  -ISS  -A1C 6.9    #HTN  -controlled  -continue Metoprolol succinate & Losartan with hold parameters.    #Hypothyroidism  -c/w home meds  -TSH up, T4 normal    DVT ppx: HSQ  Dispo: pending MRI brain      
62 y/o F with PMH of HTN, DM 2, Dyslipidemia,  Hypothyroidism, Asthma, Nephrolithiasis s/p lithotomy, and GERD who presented with left lower face numbness  NIHSS - 0  CT Head - No acute pathology.  CTA head and neck - No LVOs.  MRI Brain /Pituitary pre/post with gado   -Normal exam  2D Echo - Normal left ventricular systolic function.  LDL - 86  HbA1c - 6.9  Ecotrin 81/Plavix 75  Lipitor 40  CVA is ruled out.  Dx Paresthesia secondary to Hypertensive urgency rather than TIA  D/w Pt/ at bedside. Questions answered.  D/w hospitalist Dr. Calvo  
62 y/o F with PMH of HTN, DM 2, Dyslipidemia,  Hypothyroidism, Asthma, Nephrolithiasis s/p lithotomy, and GERD presented with left lower face numbness.       Problem/Plan - 1:  ·  Problem: TIA (transient ischemic attack).   ·  Plan: r/o CVA, facial numbness on & off, currently asymptomatic  continue on low dose Aspirin after one full dose by ED team, also continue on Plavix 75 mg daily after a loading dose by ED team as recommended by tele neurologist who was consulted by ED team, IVF hydration, I & O monitoring, CT brain without contrast was negative, CTA head & neck also negative, TTE, TSH & fasting lipid profile, glycohemoglobin in am, possible MRI brain    neurology consult      Problem/Plan - 2:  ·  Problem: DM2 (diabetes mellitus, type 2).   ·  Plan: controlled, hold Metformin for 48 hours as she received IV contrast, Patient &  both understand, started her on corrective insulin Lispro scale coverage before meals & at bedtime, check glycohemoglobin level in am.     Problem/Plan - 3:  ·  Problem: HTN (hypertension).   ·  Plan: controlled, continue Metoprolol succinate & Losartan with hold parameters.     Problem/Plan - 4:  ·  Problem: Hypothyroidism.   ·  Plan: on Levothyroxine 25 mcg PO daily, continue at same dose, check TSH level in am.     Problem/Plan - 5:  ·  Problem: Need for prophylactic measure.   ·  Plan: started her on UFH 5000 units sub Q every 8h for DVT prophylaxis, also continue on her low dose Aspirin, in addition to maintenance dose Plavix, discussed with patient &  at the bedside, they understand & agree.    
62 y/o F with PMH of HTN, DM 2, Dyslipidemia,  Hypothyroidism, Asthma, Nephrolithiasis s/p lithotomy, and GERD who presented with left lower face numbnes  Symptoms resolved.  D/D  Hypertensive urgency  TIA  CVA   NIHSS - 0  CT Head - No acute pathology.  CTA head and neck - No LVOs.  MRI Brain /Pituitary pre/post with gado are pending  2D Echo - Normal left ventricular systolic function.  LDL - 86  HbA1c - 6.9  Ecotrin 81/Plavix 75  Lipitor 40  D/w Pt/  at bedside. Questions answered.  D/w Dr. Young.  Would follow.

## 2023-05-24 ENCOUNTER — NON-APPOINTMENT (OUTPATIENT)
Age: 63
End: 2023-05-24

## 2023-05-25 ENCOUNTER — APPOINTMENT (OUTPATIENT)
Dept: INTERNAL MEDICINE | Facility: CLINIC | Age: 63
End: 2023-05-25
Payer: MEDICARE

## 2023-05-25 PROCEDURE — 96372 THER/PROPH/DIAG INJ SC/IM: CPT

## 2023-05-25 RX ORDER — CYANOCOBALAMIN 1000 UG/ML
1000 INJECTION INTRAMUSCULAR; SUBCUTANEOUS
Qty: 0 | Refills: 0 | Status: COMPLETED | OUTPATIENT
Start: 2023-05-25

## 2023-05-25 RX ADMIN — CYANOCOBALAMIN 0 MCG/ML: 1000 INJECTION INTRAMUSCULAR; SUBCUTANEOUS at 00:00

## 2023-05-30 ENCOUNTER — APPOINTMENT (OUTPATIENT)
Dept: INTERNAL MEDICINE | Facility: CLINIC | Age: 63
End: 2023-05-30

## 2023-05-30 ENCOUNTER — APPOINTMENT (OUTPATIENT)
Dept: INTERNAL MEDICINE | Facility: CLINIC | Age: 63
End: 2023-05-30
Payer: MEDICARE

## 2023-05-30 VITALS
DIASTOLIC BLOOD PRESSURE: 70 MMHG | BODY MASS INDEX: 28.35 KG/M2 | WEIGHT: 160 LBS | RESPIRATION RATE: 14 BRPM | SYSTOLIC BLOOD PRESSURE: 120 MMHG | HEART RATE: 87 BPM | TEMPERATURE: 98 F | HEIGHT: 63 IN | OXYGEN SATURATION: 98 %

## 2023-05-30 PROCEDURE — 96372 THER/PROPH/DIAG INJ SC/IM: CPT

## 2023-05-30 PROCEDURE — 99496 TRANSJ CARE MGMT HIGH F2F 7D: CPT | Mod: 25

## 2023-05-30 RX ORDER — CYANOCOBALAMIN 1000 UG/ML
1000 INJECTION INTRAMUSCULAR; SUBCUTANEOUS
Qty: 0 | Refills: 0 | Status: COMPLETED | OUTPATIENT
Start: 2023-05-30

## 2023-05-30 RX ORDER — ADHESIVE TAPE 3"X 2.3 YD
50 MCG TAPE, NON-MEDICATED TOPICAL
Qty: 30 | Refills: 5 | Status: ACTIVE | COMMUNITY
Start: 2022-06-27 | End: 1900-01-01

## 2023-05-30 RX ADMIN — CYANOCOBALAMIN 0 MCG/ML: 1000 INJECTION INTRAMUSCULAR; SUBCUTANEOUS at 00:00

## 2023-05-30 NOTE — PLAN
[FreeTextEntry1] : HTN\par - Continue amlodipine 5mg twice a day, take metoprolol once a day \par - Patient advised to log daily AM blood pressures at home \par - Contact office if she notices any drops or increases in BP \par

## 2023-05-30 NOTE — HISTORY OF PRESENT ILLNESS
[de-identified] : 63y F with PMH of HTN, DM2, Dyslipidemia,  Hypothyroidism, Asthma, Nephrolithiasis, and GERD presenting for a hospital follow up for hypertensive urgency. She was admitted to [place] for rule out TIA with presentation of left lower face numbness on 5/19 that lasted 10 minutes and then felt pressure over the left side of her face. Her SBP was in the 170's, then back to normal when she took an extra dose of Amlodipine 5 mg. Of note that she got a similar episode 3 days prior to this as well as 4 years ago. \par Hospital work up:\par CT head: negative\par CTA head and neck: negative \par ECHO: Normal LV function \par MRI brain with and without IV contrast: negative\par \par Today she states she has been taking amlodipine in the AM only and then has some dizziness later in the day and her SBP is 150s at that time. \par  [Post-hospitalization from ___ Hospital] : Post-hospitalization from [unfilled] Hospital [Patient Contacted By: ____] : and contacted by [unfilled] [FreeTextEntry2] : 63y F with PMH of HTN, DM2, Dyslipidemia,  Hypothyroidism, Asthma, Nephrolithiasis, and GERD presenting for a hospital follow up for hypertensive urgency and r/o TIA. She presented to [facility] with left lower face numbness on 5/19 that lasted 10 minutes and then felt pressure over the left side of her face. She was transferred to [place] for MRI. CT head and CTA of head and neck: negative, ECHO: Normal LV function, MRI brain with and without contrast was negative as well. Of note, she saw cardio-Dr. Dumont end of April and was advised to increase her amlodipine to 5mg BID which she was not doing consistently. She would hold PM dose if she felt her BP was to low.  at bedside also reports that pt has been checking her BP about 4 times a day because she is anxious about it. \par \par

## 2023-05-30 NOTE — HISTORY OF PRESENT ILLNESS
[de-identified] : 63y F with PMH of HTN, DM2, Dyslipidemia,  Hypothyroidism, Asthma, Nephrolithiasis, and GERD presenting for a hospital follow up for hypertensive urgency. She was admitted to [place] for rule out TIA with presentation of left lower face numbness on 5/19 that lasted 10 minutes and then felt pressure over the left side of her face. Her SBP was in the 170's, then back to normal when she took an extra dose of Amlodipine 5 mg. Of note that she got a similar episode 3 days prior to this as well as 4 years ago. \par Hospital work up:\par CT head: negative\par CTA head and neck: negative \par ECHO: Normal LV function \par MRI brain with and without IV contrast: negative\par \par Today she states she has been taking amlodipine in the AM only and then has some dizziness later in the day and her SBP is 150s at that time. \par  [Post-hospitalization from ___ Hospital] : Post-hospitalization from [unfilled] Hospital [Patient Contacted By: ____] : and contacted by [unfilled] [FreeTextEntry2] : 63y F with PMH of HTN, DM2, Dyslipidemia,  Hypothyroidism, Asthma, Nephrolithiasis, and GERD presenting for a hospital follow up for hypertensive urgency and r/o TIA. She presented to [facility] with left lower face numbness on 5/19 that lasted 10 minutes and then felt pressure over the left side of her face. She was transferred to [place] for MRI. CT head and CTA of head and neck: negative, ECHO: Normal LV function, MRI brain with and without contrast was negative as well. Of note, she saw cardio-Dr. Dumont end of April and was advised to increase her amlodipine to 5mg BID which she was not doing consistently. She would hold PM dose if she felt her BP was to low.  at bedside also reports that pt has been checking her BP about 4 times a day because she is anxious about it. \par \par

## 2023-05-30 NOTE — PHYSICAL EXAM
[No Acute Distress] : no acute distress [Well Nourished] : well nourished [Well Developed] : well developed [Well-Appearing] : well-appearing [Normal Sclera/Conjunctiva] : normal sclera/conjunctiva [PERRL] : pupils equal round and reactive to light [EOMI] : extraocular movements intact [Normal Outer Ear/Nose] : the outer ears and nose were normal in appearance [Normal Oropharynx] : the oropharynx was normal [No JVD] : no jugular venous distention [No Lymphadenopathy] : no lymphadenopathy [Supple] : supple [Thyroid Normal, No Nodules] : the thyroid was normal and there were no nodules present [No Respiratory Distress] : no respiratory distress  [No Accessory Muscle Use] : no accessory muscle use [Clear to Auscultation] : lungs were clear to auscultation bilaterally [Normal Rate] : normal rate  [Regular Rhythm] : with a regular rhythm [Normal S1, S2] : normal S1 and S2 [No Murmur] : no murmur heard [No Carotid Bruits] : no carotid bruits [No Abdominal Bruit] : a ~M bruit was not heard ~T in the abdomen [No Varicosities] : no varicosities [Pedal Pulses Present] : the pedal pulses are present [No Edema] : there was no peripheral edema [No Palpable Aorta] : no palpable aorta [No Extremity Clubbing/Cyanosis] : no extremity clubbing/cyanosis [Soft] : abdomen soft [Non Tender] : non-tender [Non-distended] : non-distended [No Masses] : no abdominal mass palpated [No HSM] : no HSM [Normal Bowel Sounds] : normal bowel sounds [Normal Posterior Cervical Nodes] : no posterior cervical lymphadenopathy [Normal Anterior Cervical Nodes] : no anterior cervical lymphadenopathy [No CVA Tenderness] : no CVA  tenderness [No Spinal Tenderness] : no spinal tenderness [No Joint Swelling] : no joint swelling [Grossly Normal Strength/Tone] : grossly normal strength/tone [No Rash] : no rash [Coordination Grossly Intact] : coordination grossly intact [Normal Gait] : normal gait [Deep Tendon Reflexes (DTR)] : deep tendon reflexes were 2+ and symmetric [Normal Affect] : the affect was normal [Normal Insight/Judgement] : insight and judgment were intact [Normal] : normal rate, regular rhythm, normal S1 and S2 and no murmur heard [No Focal Deficits] : no focal deficits [Alert and Oriented x3] : oriented to person, place, and time

## 2023-05-30 NOTE — ASSESSMENT
[FreeTextEntry1] : 63y F with PMH of HTN, DM 2, Dyslipidemia,  Hypothyroidism, Asthma, Nephrolithiasis s/p lithotomy, and GERD presenting for a hospital follow up for hypertensive urgency.

## 2023-05-31 NOTE — ED PROVIDER NOTE - CHPI ED SYMPTOMS NEG
Called Dr Marsh's office (#[phone]) and vm message left for his medical assistant to fax pt's records to our office.  Fax# and office phone# provided in this message/kahm   no shortness of breath/no fever

## 2023-06-08 ENCOUNTER — RX CHANGE (OUTPATIENT)
Age: 63
End: 2023-06-08

## 2023-06-09 ENCOUNTER — APPOINTMENT (OUTPATIENT)
Dept: INTERNAL MEDICINE | Facility: CLINIC | Age: 63
End: 2023-06-09
Payer: MEDICARE

## 2023-06-09 ENCOUNTER — MED ADMIN CHARGE (OUTPATIENT)
Age: 63
End: 2023-06-09

## 2023-06-09 VITALS
WEIGHT: 161 LBS | DIASTOLIC BLOOD PRESSURE: 80 MMHG | HEIGHT: 63 IN | BODY MASS INDEX: 28.53 KG/M2 | RESPIRATION RATE: 14 BRPM | OXYGEN SATURATION: 98 % | SYSTOLIC BLOOD PRESSURE: 122 MMHG | TEMPERATURE: 98.6 F | HEART RATE: 78 BPM

## 2023-06-09 DIAGNOSIS — E53.8 DEFICIENCY OF OTHER SPECIFIED B GROUP VITAMINS: ICD-10-CM

## 2023-06-09 PROCEDURE — 96372 THER/PROPH/DIAG INJ SC/IM: CPT

## 2023-06-09 PROCEDURE — 99213 OFFICE O/P EST LOW 20 MIN: CPT | Mod: 25

## 2023-06-09 RX ORDER — CYANOCOBALAMIN 1000 UG/ML
1000 INJECTION INTRAMUSCULAR; SUBCUTANEOUS
Qty: 0 | Refills: 0 | Status: COMPLETED | OUTPATIENT
Start: 2023-06-09

## 2023-06-09 RX ADMIN — CYANOCOBALAMIN 0 MCG/ML: 1000 INJECTION INTRAMUSCULAR; SUBCUTANEOUS at 00:00

## 2023-06-10 NOTE — HEALTH RISK ASSESSMENT
[Several Days (1)] : 5.) Poor appetite or overeating? Several days [Nearly Every Day (3)] : 6.) Feeling bad about yourself, or that you are a failure, or have let yourself or your family down? Nearly every day [Not at All (0)] : 8.) Moving or speaking so slowly that other people could have noticed, or the opposite, moving or speaking faster than usual? Not at all [Moderately Severe] : severity of depression is moderately severe [Very Difficult] : How difficult have these problems made it for you to do your work, take care of things at home, or get along with people? Very difficult [[id number]] : 19

## 2023-06-10 NOTE — PLAN
[FreeTextEntry1] : #HTN\par - Labile BPs at home, likely driven by anxiety\par - STOP amlodipine, START lisinopril 5mg qd may go up to BID if BPs remain elevated\par - Continue metoprolol 25mg qd\par - f/u 1wk for BP check\par - Will aim to control BP and then can introduce medication for pt's anxiety\par \par #B12 Deficiency\par - S/p B12 injection today

## 2023-06-10 NOTE — REVIEW OF SYSTEMS
[Fatigue] : fatigue [Hot Flashes] : hot flashes [Vision Problems] : vision problems [Headache] : headache [Dizziness] : no dizziness [Anxiety] : anxiety [Depression] : depression [Negative] : Respiratory [FreeTextEntry9] : +mid back pain b/w scapula

## 2023-06-10 NOTE — HISTORY OF PRESENT ILLNESS
[FreeTextEntry1] : follow up [de-identified] : 63y F with PMH of HTN, DM2, Dyslipidemia, Hypothyroidism, Asthma, Nephrolithiasis, and GERD presenting for follow up, was seen in office last week following hospital discharge for TIA. Here with daughter in law, they state that for the past week she has been increasingly fatigued and lays in bed all day. BP readings have been labile at home SBPs ranging from 110s-170s on amlodipine 5mg BID and metoprolol once daily, and reports feeling pressure in her head during times when her SBPs hit 170s, as well as blurry vision and back pain. Reports she was on lisinopril in the past which worked well for her. Also reports increasing anxiety and depression, worsening over the past few months.

## 2023-06-12 ENCOUNTER — RX RENEWAL (OUTPATIENT)
Age: 63
End: 2023-06-12

## 2023-06-15 ENCOUNTER — EMERGENCY (EMERGENCY)
Facility: HOSPITAL | Age: 63
LOS: 1 days | Discharge: HOME | End: 2023-06-15
Attending: EMERGENCY MEDICINE | Admitting: EMERGENCY MEDICINE
Payer: MEDICARE

## 2023-06-15 ENCOUNTER — RESULT REVIEW (OUTPATIENT)
Age: 63
End: 2023-06-15

## 2023-06-15 ENCOUNTER — APPOINTMENT (OUTPATIENT)
Dept: INTERNAL MEDICINE | Facility: CLINIC | Age: 63
End: 2023-06-15
Payer: MEDICARE

## 2023-06-15 VITALS
DIASTOLIC BLOOD PRESSURE: 78 MMHG | TEMPERATURE: 97.8 F | BODY MASS INDEX: 28.53 KG/M2 | WEIGHT: 161 LBS | OXYGEN SATURATION: 97 % | RESPIRATION RATE: 14 BRPM | HEART RATE: 88 BPM | HEIGHT: 63 IN | SYSTOLIC BLOOD PRESSURE: 120 MMHG

## 2023-06-15 VITALS
SYSTOLIC BLOOD PRESSURE: 146 MMHG | RESPIRATION RATE: 19 BRPM | OXYGEN SATURATION: 98 % | WEIGHT: 160.06 LBS | TEMPERATURE: 98 F | DIASTOLIC BLOOD PRESSURE: 84 MMHG | HEIGHT: 63 IN | HEART RATE: 84 BPM

## 2023-06-15 DIAGNOSIS — J45.909 UNSPECIFIED ASTHMA, UNCOMPLICATED: ICD-10-CM

## 2023-06-15 DIAGNOSIS — Z98.89 OTHER SPECIFIED POSTPROCEDURAL STATES: Chronic | ICD-10-CM

## 2023-06-15 DIAGNOSIS — R10.9 UNSPECIFIED ABDOMINAL PAIN: ICD-10-CM

## 2023-06-15 DIAGNOSIS — Z98.51 TUBAL LIGATION STATUS: Chronic | ICD-10-CM

## 2023-06-15 DIAGNOSIS — F41.0 PANIC DISORDER [EPISODIC PAROXYSMAL ANXIETY]: ICD-10-CM

## 2023-06-15 PROCEDURE — 99285 EMERGENCY DEPT VISIT HI MDM: CPT

## 2023-06-15 PROCEDURE — 71045 X-RAY EXAM CHEST 1 VIEW: CPT | Mod: 26

## 2023-06-15 PROCEDURE — 99214 OFFICE O/P EST MOD 30 MIN: CPT

## 2023-06-15 RX ORDER — AMLODIPINE BESYLATE 5 MG/1
5 TABLET ORAL
Qty: 60 | Refills: 0 | Status: DISCONTINUED | COMMUNITY
Start: 2023-06-12 | End: 2023-06-15

## 2023-06-15 NOTE — HISTORY OF PRESENT ILLNESS
[FreeTextEntry1] : Follow up  [de-identified] : 64 yo F who presents for a follow up. Last visit her amlodipine was stopped and started on lisinopril up to 5 mg bid. States her BPs have been persistently labile - in the 110s to 170s. Gets elevated readings at night. Has been taking lisinopril 5 mg three times a day because of this. States her pressure improves at night after taking the medicine. Also taking metoprolol 25 mg qd. States that when her bp is high, she reports she has trouble breathing and gets chills and she feels like something bad is going to happen to her. Once BP is improved she feels fine. She is also complaining of abdominal pain and spasms. She denies constipation or diarrhea. She also feels she needs to take her albuterol often, symbicort for maintenace did not work for her because she did not like how it made her feel.

## 2023-06-15 NOTE — ED ADULT NURSE NOTE - CHIEF COMPLAINT QUOTE
Pt c/o SOB x4 days; was seen by PMD and given xanax; pt was seen at [place] 2 weeks ago. Pt took Xanax at 6pm. Pt verbalizes improvement upon arrival to ED

## 2023-06-15 NOTE — REVIEW OF SYSTEMS
[Negative] : Heme/Lymph [Shortness Of Breath] : shortness of breath [Palpitations] : palpitations [Anxiety] : anxiety [FreeTextEntry7] : see hpi

## 2023-06-15 NOTE — ED ADULT NURSE NOTE - NSFALLUNIVINTERV_ED_ALL_ED
Bed/Stretcher in lowest position, wheels locked, appropriate side rails in place/Call bell, personal items and telephone in reach/Instruct patient to call for assistance before getting out of bed/chair/stretcher/Non-slip footwear applied when patient is off stretcher/[place] to call system/Physically safe environment - no spills, clutter or unnecessary equipment/Purposeful proactive rounding/Room/bathroom lighting operational, light cord in reach

## 2023-06-15 NOTE — ED ADULT NURSE NOTE - OBJECTIVE STATEMENT
pt presented with c/o shortness of breath, denies CP or any other complaints. pt reports she is feeling better as she  took  xanax before coming.

## 2023-06-15 NOTE — PLAN
[FreeTextEntry1] : HTN\par - BP labile with elevated readings likely stress induced \par - Increase lisinopril to 20 mg qd\par - Continue metoprolol 25 mg qd\par \par Panic attacks\par - suspect feelings of SOB, 'chills' during periods of elevated BP readings are 2/2 panic attacks\par - Xanax prn \par \par Abdominal pain\par - f/u US results \par \par Asthma\par - trial pulmicort for maintenace\par

## 2023-06-15 NOTE — ED ADULT NURSE NOTE - WAS YOUR LAST COVID-19 VACCINE GREATER THAN OR EQUAL TO TWO MONTHS AGO?
Wound Care Consult Note    Chief Complaint:   Chief Complaint   Patient presents with   • Wound       History of Present Illness:     Yoanna Prajapati is a 72 year old female with a past medical history of COPD, HTN, HLD, osteoporosis, venous insufficieny, s/p cervical fusion secondary to spinal stenosis, right breast cancer, s/p lumpectomy, radiation therapy, s/p surgical wound infection in 11/2022 (s/p IV antibiotics), active tobacco user; she presents to the wound center today for evaluation and treatment of non healed right breast surgical wound.    He is under PT wound care. This writer is consulted to establish care and assist with an overall management of the wound.     In addition, patient saw Dr. Atkins for discussion regarding possible breast reconstruction (no surgery is scheduled at this time). She was recommended to undergo hyperbaric oxygen treatment but will not be able to proceed due to issues with transportation.     Wound site pain: none    Appetite is good      DIABETES  denies    Past Medical History:   Diagnosis Date   • Atrial fibrillation (CMD)    • Breast cancer (CMD)     s/p right-sided lumpectomy and radiation   • Cervical spinal stenosis     cervical spondylotic myelopathy and quadriparesis.  She had severe C4-5 stenosis and underwent a C4-5 anterior cervical discectomy and fusion on May 2018   • Chronic arm pain    • Chronic sinusitis    • COPD (chronic obstructive pulmonary disease) (CMD)    • Fracture, hip (CMD)    • HTN (hypertension)    • Hyperlipidemia    • Lumbar degenerative disc disease    • Osteoporosis    • Other spondylosis with myelopathy, cervical region    • Postlaminectomy syndrome of cervical region    • Premature atrial beats     multifocal atrial tachycardia   • Pure hypercholesterolemia    • Seasonal allergies     postnasal drip   • SVT (supraventricular tachycardia) (CMD)    • Venous insufficiency         Past Surgical History:   Procedure Laterality Date   • Breast  lumpectomy Right 04/2020    DCIS   • Breast surgery Right 1998   • Cervical fusion      C4-C5   • Hip fracture surgery Right 01/07/2022    ORIF   • Tonsillectomy         Social History     Socioeconomic History   • Marital status:      Spouse name: Not on file   • Number of children: Not on file   • Years of education: Not on file   • Highest education level: Not on file   Occupational History   • Not on file   Tobacco Use   • Smoking status: Every Day     Packs/day: 0.50     Types: Cigarettes   • Smokeless tobacco: Never   Vaping Use   • Vaping Use: never used   Substance and Sexual Activity   • Alcohol use: Yes     Comment: 2 glasses of wine per day    • Drug use: Yes     Types: Prescription Drugs   • Sexual activity: Not on file   Other Topics Concern   • Not on file   Social History Narrative         Social Determinants of Health     Financial Resource Strain: Not on file   Food Insecurity: Not on file   Transportation Needs: Not on file   Physical Activity: Not on file   Stress: Not on file   Social Connections: Socially Integrated   • Social Determinants: Social Connections: 5 or more times a week   Intimate Partner Violence: Not At Risk   • Social Determinants: Intimate Partner Violence Past Fear: No   • Social Determinants: Intimate Partner Violence Current Fear: No        ALLERGIES:  Seasonal     Current Outpatient Medications   Medication Sig Dispense Refill   • valsartan-hydroCHLOROthiazide (DIOVAN-HCT) 320-12.5 MG per tablet TAKE 1 TABLET BY MOUTH DAILY 90 tablet 0   • erythromycin (ILOTYCIN) ophthalmic ointment Place 0.5 inches into both eyes 4 times daily. 3.5 g 1   • metoPROLOL succinate (TOPROL-XL) 100 MG 24 hr tablet Take 1 tablet by mouth every 12 hours. 90 tablet 0   • fluticasone-salmeterol 250-50 MCG/ACT inhaler Inhale 1 puff into the lungs daily as needed. Indications: wheezing     • docusate sodium (COLACE) 100 MG capsule Take 100 mg by mouth in the morning and 100 mg in the  evening.     • atorvastatin (LIPITOR) 10 MG tablet Take 1 tablet by mouth daily. 90 tablet 0   • gabapentin (NEURONTIN) 300 MG capsule TAKE 1 CAPSULE BY MOUTH FOUR TIMES DAILY (Patient taking differently: Take 300 mg by mouth in the morning and 300 mg at noon and 300 mg in the evening.) 120 capsule 11   • ipratropium (ATROVENT) 0.06 % nasal spray USE 1 SPRAY IN EACH NOSTRIL EVERY 8 HOURS (Patient taking differently: Spray 1 spray in each nostril 3 times daily.) 45 mL 0   • bumetanide (BUMEX) 1 MG tablet Take 1 tablet by mouth daily as needed (swelling). 90 tablet 0   • Denosumab (PROLIA SC) Inject into the skin every 6 months.     • albuterol (ProAir HFA) 108 (90 Base) MCG/ACT inhaler Inhale 2 puffs into the lungs every 4 hours as needed for Shortness of Breath. 1 each 12   • Valacyclovir HCl 1000 MG Tab TAKE 1 TABLET EVERY 12 HOURS DAILY PRN (Patient taking differently: as needed (cold sores). TAKE 1 TABLET EVERY 12 HOURS DAILY PRN) 30 tablet 0   • diphenhydrAMINE (BENADRYL) 25 MG capsule Take 25 mg by mouth daily as needed.     • Multiple Vitamins-Minerals (CENTRUM SILVER 50+MEN PO)      • aspirin 325 MG tablet Take 325 mg by mouth every 24 hours.     • Cholecalciferol (vitamin D3) 125 mcg (5,000 units) capsule Take 125 mcg by mouth daily.     • traMADol (ULTRAM) 50 MG tablet Take 50 mg by mouth every 8 hours as needed.     • vitamin E 400 UNIT capsule Take 400 Units by mouth every 24 hours.     • MAGNESIUM OXIDE PO Take 100 mg by mouth. PT TAKES 1 TABLET EVERY OTHER DAY     • anastrozole (ARIMIDEX) 1 MG tablet Take 1 mg by mouth daily.     • acetaminophen (TYLENOL) 500 MG tablet Take 500 mg by mouth every 6 hours as needed. TAKE 1 TABLET EVERY 4 TO 6 HOURS AS NEEDED     • Ascorbic Acid (VITAMIN C) 500 MG Cap Take 500 mg by mouth daily. TAKE 1 CAPSULE DAILY       No current facility-administered medications for this encounter.        Review of Systems  Review of Systems   Constitutional: Negative.    HENT:  Negative.    Respiratory: Negative.    Cardiovascular: Positive for leg swelling. Negative for chest pain and palpitations.   Gastrointestinal: Negative.    Genitourinary: Negative.    Musculoskeletal: Negative.    Skin: Positive for wound.   Neurological: Negative.    Psychiatric/Behavioral: Negative.        Physical Exam  There were no vitals filed for this visit.   SpO2 Readings from Last 1 Encounters:   03/21/23 96%        Wt Readings from Last 4 Encounters:   04/06/23 53.3 kg (117 lb 8.1 oz)   03/21/23 54.4 kg (120 lb)   02/18/23 52.8 kg (116 lb 6.5 oz)   02/02/23 54.3 kg (119 lb 11.4 oz)         Physical Exam  Constitutional:       General: She is not in acute distress.  HENT:      Head: Normocephalic.      Nose: Nose normal.      Neck: Normal range of motion.   Pulmonary:      Effort: Pulmonary effort is normal. No respiratory distress.   Abdominal:      General: There is no distension.   Musculoskeletal:         General: Normal range of motion.   Skin:     Findings: Lesion present.   Neurological:      Mental Status: She is alert and oriented to person, place, and time.   Psychiatric:         Mood and Affect: Mood normal.         Behavior: Behavior normal.         Wound Breast Right Superior/Upper (Active)   Date First Assessed/Time First Assessed: 04/06/23 1016   Location: Breast  Laterality: Right  Modifier: Superior/Upper      Assessments 4/6/2023 10:00 AM   Wound Image      Wound Care Team Consult Date 04/06/23   Dressing Assessment Intact;Drainage present   Dressing Activity Changed   Dressing Changed On   04/06/23   Wound Exudate Small;Serous   Cleansing Agent Commercial cleansing solution;Hypochlorous acid (e.g. Vashe)   Wound Bed/Tissue Type Other (comment) (difficult to visualize wound base due to size and location of wound)   Periwound Condition Normal;Other (comment) (Firm tissue surround wound)   Wound Edge Well defined   Wound Last Measured 04/06/23   Wound Length (cm) 0.3 cm   Wound Width (cm)  0.3 cm   Wound Depth (cm) 1 cm   Wound Surface Area (cm^2) 0.09 cm^2   Wound Volume (cm^3) 0.09 cm^3   PhotoTaken? Yes      Current Treatment Regimen:        Laboratory assessments:   No results found for: PAB    No results available in last 24 hours   Lab Results   Component Value Date    WBC 7.8 04/06/2023    GLUCOSE 104 (H) 04/06/2023    CRP 1.8 (H) 04/19/2018    CRP 1.8 (H) 04/19/2018    CREATININE 0.59 04/06/2023    ALBUMIN 3.7 04/06/2023     No results found for this or any previous visit.  No results found for this or any previous visit.           Assessment and Plan:   Problem List Items Addressed This Visit        Musculoskeletal and Injuries    Open wound     Non healed surgical wound, left breast    See PT wound care for details of local wound care/plan             WOUND TREATMENT ORDERS:    Treatment  Wound Location: R breast  Treatment Type: Debridement  Debridement  Photo Taken: Yes  Wound Cleansing : Dermal wound cleanser  Antiseptic Soak to Wound: Other (comment) (Flushed 5 mL of vashe via angiocath tubing and syringe)  Primary Wound Dressing: Maxorb Extra Ag+  Secondary Wound Dressing: Foam silicone dressing with border 3 X 3  Dressing Time Spent (min): 15     Follow up at the wound center:  As per PT wound care    HOME HEALTH CARE AGENCY: n/a      DAVIAN Montero     The time of this note does not reflect exact time patient seen    I spent a total of 35 mins on this encounter    This includes face to face time, and time spent for performing chart review, care coordination, and documenting               Yes

## 2023-06-15 NOTE — ED ADULT TRIAGE NOTE - WEIGHT IN LBS
[de-identified] : - Constitutional: This is a female in no obvious distress.  \par - Psych: Patient is alert and oriented to person, place and time.  Patient has a normal mood and affect.\par - Cardiovascular: Normal pulses throughout the upper extremities.  No significant varicosities are noted in the upper extremities. \par - Neuro: Strength and sensation are intact throughout the upper extremities.  Patient has normal coordination.\par - Respiratory:  Patient exhibits no evidence of shortness of breath or difficulty breathing.\par - Skin: No rashes, lesions, or other abnormalities are noted in the upper extremities.\par ---\par \par Examination of her right wrist and hand demonstrates swelling dorsally most notably over the index and middle finger MCP joints.  She is tender along the index finger MCP joint.  She also has tenderness along the dorsal wrist capsule.  She has pain with flexion and extension of the wrist and digits and limitation of terminal motion.  Her flexor and extensor tendons are intact.  She does have subjective complaints of some tingling in the hand but has intact sensation to light touch distally along the radial, ulnar and median nerve distributions. [de-identified] : PA lateral, and oblique radiographs of the right wrist and hand demonstrate no obvious fractures or dislocations.	\par  160

## 2023-06-15 NOTE — ED ADULT TRIAGE NOTE - CHIEF COMPLAINT QUOTE
Pt c/o SOB x4 days; was seen by PMD and given xanax; pt was seen at [facility] 2 weeks ago. Pt took Xanax at 6pm Pt c/o SOB x4 days; was seen by PMD and given xanax; pt was seen at [facility] 2 weeks ago. Pt took Xanax at 6pm. Pt verbalizes improvement upon arrival to ED

## 2023-06-16 VITALS
HEART RATE: 67 BPM | DIASTOLIC BLOOD PRESSURE: 56 MMHG | RESPIRATION RATE: 18 BRPM | OXYGEN SATURATION: 94 % | SYSTOLIC BLOOD PRESSURE: 108 MMHG | TEMPERATURE: 97 F

## 2023-06-16 LAB
ALBUMIN SERPL ELPH-MCNC: 3.8 G/DL — SIGNIFICANT CHANGE UP (ref 3.3–5)
ALP SERPL-CCNC: 73 U/L — SIGNIFICANT CHANGE UP (ref 30–120)
ALT FLD-CCNC: 19 U/L DA — SIGNIFICANT CHANGE UP (ref 10–60)
ANION GAP SERPL CALC-SCNC: 10 MMOL/L — SIGNIFICANT CHANGE UP (ref 5–17)
AST SERPL-CCNC: 11 U/L — SIGNIFICANT CHANGE UP (ref 10–40)
BASOPHILS # BLD AUTO: 0.03 K/UL — SIGNIFICANT CHANGE UP (ref 0–0.2)
BASOPHILS NFR BLD AUTO: 0.3 % — SIGNIFICANT CHANGE UP (ref 0–2)
BILIRUB SERPL-MCNC: 0.2 MG/DL — SIGNIFICANT CHANGE UP (ref 0.2–1.2)
BUN SERPL-MCNC: 16 MG/DL — SIGNIFICANT CHANGE UP (ref 7–23)
CALCIUM SERPL-MCNC: 9.3 MG/DL — SIGNIFICANT CHANGE UP (ref 8.4–10.5)
CHLORIDE SERPL-SCNC: 104 MMOL/L — SIGNIFICANT CHANGE UP (ref 96–108)
CO2 SERPL-SCNC: 24 MMOL/L — SIGNIFICANT CHANGE UP (ref 22–31)
CREAT SERPL-MCNC: 0.73 MG/DL — SIGNIFICANT CHANGE UP (ref 0.5–1.3)
D DIMER BLD IA.RAPID-MCNC: <150 NG/ML DDU — SIGNIFICANT CHANGE UP
EGFR: 91 ML/MIN/1.73M2 — SIGNIFICANT CHANGE UP
EOSINOPHIL # BLD AUTO: 0.52 K/UL — HIGH (ref 0–0.5)
EOSINOPHIL NFR BLD AUTO: 5.7 % — SIGNIFICANT CHANGE UP (ref 0–6)
GLUCOSE SERPL-MCNC: 133 MG/DL — HIGH (ref 70–99)
HCT VFR BLD CALC: 36.2 % — SIGNIFICANT CHANGE UP (ref 34.5–45)
HGB BLD-MCNC: 11.9 G/DL — SIGNIFICANT CHANGE UP (ref 11.5–15.5)
IMM GRANULOCYTES NFR BLD AUTO: 0.2 % — SIGNIFICANT CHANGE UP (ref 0–0.9)
LYMPHOCYTES # BLD AUTO: 2.96 K/UL — SIGNIFICANT CHANGE UP (ref 1–3.3)
LYMPHOCYTES # BLD AUTO: 32.4 % — SIGNIFICANT CHANGE UP (ref 13–44)
MCHC RBC-ENTMCNC: 29.1 PG — SIGNIFICANT CHANGE UP (ref 27–34)
MCHC RBC-ENTMCNC: 32.9 GM/DL — SIGNIFICANT CHANGE UP (ref 32–36)
MCV RBC AUTO: 88.5 FL — SIGNIFICANT CHANGE UP (ref 80–100)
MONOCYTES # BLD AUTO: 0.75 K/UL — SIGNIFICANT CHANGE UP (ref 0–0.9)
MONOCYTES NFR BLD AUTO: 8.2 % — SIGNIFICANT CHANGE UP (ref 2–14)
NEUTROPHILS # BLD AUTO: 4.85 K/UL — SIGNIFICANT CHANGE UP (ref 1.8–7.4)
NEUTROPHILS NFR BLD AUTO: 53.2 % — SIGNIFICANT CHANGE UP (ref 43–77)
NRBC # BLD: 0 /100 WBCS — SIGNIFICANT CHANGE UP (ref 0–0)
NT-PROBNP SERPL-SCNC: 44 PG/ML — SIGNIFICANT CHANGE UP (ref 0–125)
PLATELET # BLD AUTO: 285 K/UL — SIGNIFICANT CHANGE UP (ref 150–400)
POTASSIUM SERPL-MCNC: 3.7 MMOL/L — SIGNIFICANT CHANGE UP (ref 3.5–5.3)
POTASSIUM SERPL-SCNC: 3.7 MMOL/L — SIGNIFICANT CHANGE UP (ref 3.5–5.3)
PROT SERPL-MCNC: 7.3 G/DL — SIGNIFICANT CHANGE UP (ref 6–8.3)
RBC # BLD: 4.09 M/UL — SIGNIFICANT CHANGE UP (ref 3.8–5.2)
RBC # FLD: 13.7 % — SIGNIFICANT CHANGE UP (ref 10.3–14.5)
SODIUM SERPL-SCNC: 138 MMOL/L — SIGNIFICANT CHANGE UP (ref 135–145)
TROPONIN I, HIGH SENSITIVITY RESULT: 4.5 NG/L — SIGNIFICANT CHANGE UP
WBC # BLD: 9.13 K/UL — SIGNIFICANT CHANGE UP (ref 3.8–10.5)
WBC # FLD AUTO: 9.13 K/UL — SIGNIFICANT CHANGE UP (ref 3.8–10.5)

## 2023-06-16 PROCEDURE — 93010 ELECTROCARDIOGRAM REPORT: CPT

## 2023-06-16 PROCEDURE — 71045 X-RAY EXAM CHEST 1 VIEW: CPT

## 2023-06-16 PROCEDURE — 36415 COLL VENOUS BLD VENIPUNCTURE: CPT

## 2023-06-16 PROCEDURE — 83880 ASSAY OF NATRIURETIC PEPTIDE: CPT

## 2023-06-16 PROCEDURE — 84484 ASSAY OF TROPONIN QUANT: CPT

## 2023-06-16 PROCEDURE — 85025 COMPLETE CBC W/AUTO DIFF WBC: CPT

## 2023-06-16 PROCEDURE — 93005 ELECTROCARDIOGRAM TRACING: CPT

## 2023-06-16 PROCEDURE — 71250 CT THORAX DX C-: CPT | Mod: 26,MA

## 2023-06-16 PROCEDURE — 80053 COMPREHEN METABOLIC PANEL: CPT

## 2023-06-16 PROCEDURE — 99285 EMERGENCY DEPT VISIT HI MDM: CPT | Mod: 25

## 2023-06-16 PROCEDURE — 85379 FIBRIN DEGRADATION QUANT: CPT

## 2023-06-16 PROCEDURE — 71250 CT THORAX DX C-: CPT | Mod: MA

## 2023-06-16 NOTE — ED PROVIDER NOTE - WR INTERPRETED BY 1
[facility]   Intake Diagnosis & Plan    Name of Physician Contacted: lalita    Physicians Recommended Level of Care: [facility], Inpatient    Placement Patient Agrees To: [facility], Inpatient    Reasons for Level of Care    Reason(s) for Inpatient Treatment: Danger to self/others/property with plan and intent or attempt    Provisional Axis I Diagnosis: depression    Intake Comments / Summary : Pt admitted to unit 3 with Dr. Perez attending       Andrey Puri

## 2023-06-16 NOTE — ED PROVIDER NOTE - PATIENT PORTAL LINK FT
You can access the FollowMyHealth Patient Portal offered by [facility] by registering at the following website: [email or web address]. By joining [facility]’s FollowMyHealth portal, you will also be able to view your health information using other applications (apps) compatible with our system.

## 2023-06-16 NOTE — ED PROVIDER NOTE - NSFOLLOWUPINSTRUCTIONS_ED_ALL_ED_FT
Follow up with your primary care doctor in 2-3 days.    Managing Anxiety, Adult  After being diagnosed with anxiety, you may be relieved to know why you have felt or behaved a certain way. You may also feel overwhelmed about the treatment ahead and what it will mean for your life. With care and support, you can manage this condition.    How to manage lifestyle changes  Managing stress and anxiety    An adult doing mindful breathing while practicing yoga.  Stress is your body's reaction to life changes and events, both good and bad. Most stress will last just a few hours, but stress can be ongoing and can lead to more than just stress. Although stress can play a major role in anxiety, it is not the same as anxiety. Stress is usually caused by something external, such as a deadline, test, or competition. Stress normally passes after the triggering event has ended.    Anxiety is caused by something internal, such as imagining a terrible outcome or worrying that something will go wrong that will devastate you. Anxiety often does not go away even after the triggering event is over, and it can become long-term (chronic) worry. It is important to understand the differences between stress and anxiety and to manage your stress effectively so that it does not lead to an anxious response.    Talk with your health care provider or a counselor to learn more about reducing anxiety and stress. He or she may suggest tension reduction techniques, such as:  Music therapy. Spend time creating or listening to music that you enjoy and that inspires you.  Mindfulness-based meditation. Practice being aware of your normal breaths while not trying to control your breathing. It can be done while sitting or walking.  Centering prayer. This involves focusing on a word, phrase, or sacred image that means something to you and brings you peace.  Deep breathing. To do this, expand your stomach and inhale slowly through your nose. Hold your breath for 3–5 seconds. Then exhale slowly, letting your stomach muscles relax.  Self-talk. Learn to notice and identify thought patterns that lead to anxiety reactions and change those patterns to thoughts that feel peaceful.  Muscle relaxation. Taking time to tense muscles and then relax them.  Choose a tension reduction technique that fits your lifestyle and personality. These techniques take time and practice. Set aside 5–15 minutes a day to do them. Therapists can offer counseling and training in these techniques. The training to help with anxiety may be covered by some insurance plans.    Other things you can do to manage stress and anxiety include:  Keeping a stress diary. This can help you learn what triggers your reaction and then learn ways to manage your response.  Thinking about how you react to certain situations. You may not be able to control everything, but you can control your response.  Making time for activities that help you relax and not feeling guilty about spending your time in this way.  Doing visual imagery. This involves imagining or creating mental pictures to help you relax.  Practicing yoga. Through yoga poses, you can lower tension and promote relaxation.  Medicines    Medicines can help ease symptoms. Medicines for anxiety include:  Antidepressant medicines. These are usually prescribed for long-term daily control.  Anti-anxiety medicines. These may be added in severe cases, especially when panic attacks occur.  Medicines will be prescribed by a health care provider. When used together, medicines, psychotherapy, and tension reduction techniques may be the most effective treatment.    Relationships    Relationships can play a big part in helping you recover. Try to spend more time connecting with trusted friends and family members.  Consider going to couples counseling if you have a partner, taking family education classes, or going to family therapy.  Therapy can help you and others better understand your condition.  How to recognize changes in your anxiety  Everyone responds differently to treatment for anxiety. Recovery from anxiety happens when symptoms decrease and stop interfering with your daily activities at home or work. This may mean that you will start to:  Have better concentration and focus. Worry will interfere less in your daily thinking.  Sleep better.  Be less irritable.  Have more energy.  Have improved memory.  It is also important to recognize when your condition is getting worse. Contact your health care provider if your symptoms interfere with home or work and you feel like your condition is not improving.    Follow these instructions at home:  Activity    Exercise. Adults should do the following:  Exercise for at least 150 minutes each week. The exercise should increase your heart rate and make you sweat (moderate-intensity exercise).  Strengthening exercises at least twice a week.  Get the right amount and quality of sleep. Most adults need 7–9 hours of sleep each night.  Lifestyle    Two adults cook together in a kitchen. Fruit and vegetables are on the counter in front of them.  Eat a healthy diet that includes plenty of vegetables, fruits, whole grains, low-fat dairy products, and lean protein.  Do not eat a lot of foods that are high in fats, added sugars, or salt (sodium).  Make choices that simplify your life.  Do not use any products that contain nicotine or tobacco. These products include cigarettes, chewing tobacco, and vaping devices, such as e-cigarettes. If you need help quitting, ask your health care provider.  Avoid caffeine, alcohol, and certain over-the-counter cold medicines. These may make you feel worse. Ask your pharmacist which medicines to avoid.  General instructions    Take over-the-counter and prescription medicines only as told by your health care provider.  Keep all follow-up visits. This is important.  Where to find support  You can get help and support from these sources:  Self-help groups.  Online and community organizations.  A trusted spiritual leader.  Couples counseling.  Family education classes.  Family therapy.  Where to find more information  You may find that joining a support group helps you deal with your anxiety. The following sources can help you locate counselors or support groups near you:  Mental Health [place]: www.mentalhealthamerica.net  Anxiety and Depression Association of [place] (ADAA): www.adaa.org  National [place] on Mental Illness (ROSETTE): www.rosette.org  Contact a health care provider if:  You have a hard time staying focused or finishing daily tasks.  You spend many hours a day feeling worried about everyday life.  You become exhausted by worry.  You start to have headaches or frequently feel tense.  You develop chronic nausea or diarrhea.  Get help right away if:  You have a racing heart and shortness of breath.  You have thoughts of hurting yourself or others.  If you ever feel like you may hurt yourself or others, or have thoughts about taking your own life, get help right away. Go to your nearest emergency department or:  Call your local emergency services (067 in the [place]  Call a suicide crisis helpline, such as the National Suicide Prevention Lifeline at 1-132.776.8359 or 333 in the [place] This is open 24 hours a day in the [place]  Text the Crisis Text Line at 852697 (in the [place]  Summary  Taking steps to learn and use tension reduction techniques can help calm you and help prevent triggering an anxiety reaction.  When used together, medicines, psychotherapy, and tension reduction techniques may be the most effective treatment.  Family, friends, and partners can play a big part in supporting you.  This information is not intended to replace advice given to you by your health care provider. Make sure you discuss any questions you have with your health care provider.

## 2023-06-16 NOTE — ED PROVIDER NOTE - OBJECTIVE STATEMENT
63-year-old female with history of hypertension, diabetes, asthma who is presenting for shortness of breath that started at 6 PM for which she took a Xanax which she was recently prescribed.  Xanax made her feel dizzy and then she took a nebulizer treatment which made her nervous and jittery which prompted her to come to the ED.  Patient reports she has been getting very anxious lately, she was admitted here short while ago for similar complaints had a complete work-up and was told everything is okay.  She saw her primary care doctor who told her that whenever she gets anxious or short of breath to take a Xanax.  Denies any preceding URI symptoms.  Denies chest pain.  Denies cough.  Denies leg swelling.  Denies recent travel.

## 2023-06-16 NOTE — ED PROVIDER NOTE - PROGRESS NOTE DETAILS
Patient feels better and is resting comfortably.  Her  is at her bedside.  Labs reviewed, unremarkable.  EKG is normal sinus rhythm no obvious ischemic abnormalities.  Chest x-ray showed some retrocardiac haziness CT chest was ordered to further evaluate.  CT chest is done waiting for report. CT chest unremarkable "I broke my water"

## 2023-06-16 NOTE — ED ADULT NURSE REASSESSMENT NOTE - NSFALLUNIVINTERV_ED_ALL_ED
Bed/Stretcher in lowest position, wheels locked, appropriate side rails in place/Call bell, personal items and telephone in reach/Instruct patient to call for assistance before getting out of bed/chair/stretcher/Non-slip footwear applied when patient is off stretcher/[place] to call system/Physically safe environment - no spills, clutter or unnecessary equipment/Purposeful proactive rounding/Room/bathroom lighting operational, light cord in reach

## 2023-06-16 NOTE — ED PROVIDER NOTE - CLINICAL SUMMARY MEDICAL DECISION MAKING FREE TEXT BOX
63-year-old female with an anxiety presenting for shortness of breath that started at rest at 6 PM.  Denies chest pain.  Starting to feel better.  Took Xanax prior to arrival.  Lungs are clear to auscultation.  Vital signs are normal.  EKG is nonischemic.  Will get chest x-ray labs and reassess.

## 2023-06-20 ENCOUNTER — APPOINTMENT (OUTPATIENT)
Dept: ULTRASOUND IMAGING | Facility: CLINIC | Age: 63
End: 2023-06-20
Payer: MEDICARE

## 2023-06-20 PROCEDURE — 76857 US EXAM PELVIC LIMITED: CPT

## 2023-06-20 PROCEDURE — 76700 US EXAM ABDOM COMPLETE: CPT

## 2023-06-22 ENCOUNTER — TRANSCRIPTION ENCOUNTER (OUTPATIENT)
Age: 63
End: 2023-06-22

## 2023-06-26 ENCOUNTER — NON-APPOINTMENT (OUTPATIENT)
Age: 63
End: 2023-06-26

## 2023-06-29 ENCOUNTER — APPOINTMENT (OUTPATIENT)
Dept: INTERNAL MEDICINE | Facility: CLINIC | Age: 63
End: 2023-06-29
Payer: MEDICARE

## 2023-06-29 VITALS
RESPIRATION RATE: 14 BRPM | OXYGEN SATURATION: 97 % | TEMPERATURE: 98.3 F | SYSTOLIC BLOOD PRESSURE: 112 MMHG | HEIGHT: 63 IN | BODY MASS INDEX: 28.35 KG/M2 | DIASTOLIC BLOOD PRESSURE: 70 MMHG | WEIGHT: 160 LBS | HEART RATE: 77 BPM

## 2023-06-29 PROCEDURE — 99214 OFFICE O/P EST MOD 30 MIN: CPT

## 2023-06-29 NOTE — PLAN
[FreeTextEntry1] : HTN\par - Bp well controlled. Elevated during periods of anxiety \par - Continue amlodipine 5 mg qd, lisinopril 5 mg qd, and metoprolol 25 xl mg qd\par - F/u with cardio for stress test\par \par Anxiety \par - pt hesitant to try daily SSRI for maintenance of her anxiety \par - she prefers to monitor her symptoms and take xanax sparingly, if she finds herself taking xanax daily, we will start a SSRI \par \par T2DM: refill metformin, (+) microalbuminuria - continue lisinopril, on statin \par HLD: continue with atorvastatin \par Hypothyroidism: continue with levothyroxine. \par

## 2023-06-29 NOTE — PHYSICAL EXAM
[No Acute Distress] : no acute distress [Well Nourished] : well nourished [Well Developed] : well developed [Well-Appearing] : well-appearing [Normal Sclera/Conjunctiva] : normal sclera/conjunctiva [EOMI] : extraocular movements intact [Normal Outer Ear/Nose] : the outer ears and nose were normal in appearance [Normal Oropharynx] : the oropharynx was normal [No JVD] : no jugular venous distention [No Lymphadenopathy] : no lymphadenopathy [Supple] : supple [Thyroid Normal, No Nodules] : the thyroid was normal and there were no nodules present [No Respiratory Distress] : no respiratory distress  [No Accessory Muscle Use] : no accessory muscle use [Clear to Auscultation] : lungs were clear to auscultation bilaterally [Normal Rate] : normal rate  [Regular Rhythm] : with a regular rhythm [Normal S1, S2] : normal S1 and S2 [No Murmur] : no murmur heard [No Abdominal Bruit] : a ~M bruit was not heard ~T in the abdomen [No Varicosities] : no varicosities [Pedal Pulses Present] : the pedal pulses are present [No Edema] : there was no peripheral edema [No Palpable Aorta] : no palpable aorta [No Extremity Clubbing/Cyanosis] : no extremity clubbing/cyanosis [Soft] : abdomen soft [Non Tender] : non-tender [Non-distended] : non-distended [No Masses] : no abdominal mass palpated [No HSM] : no HSM [Normal Bowel Sounds] : normal bowel sounds [Normal Posterior Cervical Nodes] : no posterior cervical lymphadenopathy [Normal Anterior Cervical Nodes] : no anterior cervical lymphadenopathy [No CVA Tenderness] : no CVA  tenderness [No Spinal Tenderness] : no spinal tenderness [No Joint Swelling] : no joint swelling [Grossly Normal Strength/Tone] : grossly normal strength/tone [No Rash] : no rash [Coordination Grossly Intact] : coordination grossly intact [No Focal Deficits] : no focal deficits [Normal Gait] : normal gait [Deep Tendon Reflexes (DTR)] : deep tendon reflexes were 2+ and symmetric [Normal Affect] : the affect was normal [Normal Insight/Judgement] : insight and judgment were intact

## 2023-06-30 ENCOUNTER — APPOINTMENT (OUTPATIENT)
Dept: CARDIOLOGY | Facility: CLINIC | Age: 63
End: 2023-06-30

## 2023-07-08 ENCOUNTER — TRANSCRIPTION ENCOUNTER (OUTPATIENT)
Age: 63
End: 2023-07-08

## 2023-07-10 ENCOUNTER — APPOINTMENT (OUTPATIENT)
Dept: INTERNAL MEDICINE | Facility: CLINIC | Age: 63
End: 2023-07-10
Payer: MEDICARE

## 2023-07-10 ENCOUNTER — NON-APPOINTMENT (OUTPATIENT)
Age: 63
End: 2023-07-10

## 2023-07-10 VITALS
WEIGHT: 160 LBS | HEART RATE: 78 BPM | OXYGEN SATURATION: 96 % | RESPIRATION RATE: 14 BRPM | HEIGHT: 63 IN | SYSTOLIC BLOOD PRESSURE: 118 MMHG | TEMPERATURE: 98.3 F | DIASTOLIC BLOOD PRESSURE: 80 MMHG | BODY MASS INDEX: 28.35 KG/M2

## 2023-07-10 DIAGNOSIS — R06.02 SHORTNESS OF BREATH: ICD-10-CM

## 2023-07-10 PROCEDURE — 99214 OFFICE O/P EST MOD 30 MIN: CPT | Mod: 25

## 2023-07-10 PROCEDURE — 93000 ELECTROCARDIOGRAM COMPLETE: CPT | Mod: 59

## 2023-07-10 PROCEDURE — 96372 THER/PROPH/DIAG INJ SC/IM: CPT

## 2023-07-10 RX ORDER — CYANOCOBALAMIN 1000 UG/ML
1000 INJECTION INTRAMUSCULAR; SUBCUTANEOUS
Qty: 0 | Refills: 0 | Status: COMPLETED | OUTPATIENT
Start: 2023-07-10

## 2023-07-10 RX ADMIN — CYANOCOBALAMIN 0 MCG/ML: 1000 INJECTION INTRAMUSCULAR; SUBCUTANEOUS at 00:00

## 2023-07-10 NOTE — HISTORY OF PRESENT ILLNESS
[FreeTextEntry8] : 63 year old female who presents today with complaint of SOB, high blood pressure, anxiety and palpitations. She states that this has been going on intermittently for the past month. This morning when she woke up she started feeling anxious and out of breath. She took her blood pressure at that time and it was 165/100. She took her Xanax and her symptoms resolved. She had gone to the ER for similar symptoms and had been advised to see cardiology for a stress test. She has not yet had her stress test. She states that when she takes Xanax she does feel much better and her symptoms resolve. She has not yet started the sertraline that was prescribed as maintenance therapy for her anxiety.

## 2023-07-10 NOTE — PLAN
[FreeTextEntry1] : Patient presenting with symptoms of SOB, palpitations, HTN that have been occurring intermittently over the past month. These symptoms resolve with Xanax, likely corresponding to panic attacks and anxiety. She has been advised to continue Xanax PRN, take sertraline for maintenance therapy, Her blood pressure is controlled, she should continue lisinopril, amlodipine and metoprolol. She will f/u with cardio for nuclear stress test. EKG today shows sinus rhythm with nonspecific T abnormalities that is unchanged from prior. \par \par B12 deficiency: Vitamin B12 administered in office today

## 2023-07-11 ENCOUNTER — APPOINTMENT (OUTPATIENT)
Dept: CARDIOLOGY | Facility: CLINIC | Age: 63
End: 2023-07-11
Payer: MEDICARE

## 2023-07-11 ENCOUNTER — APPOINTMENT (OUTPATIENT)
Dept: CARDIOLOGY | Facility: CLINIC | Age: 63
End: 2023-07-11

## 2023-07-11 PROCEDURE — 93306 TTE W/DOPPLER COMPLETE: CPT

## 2023-07-12 ENCOUNTER — APPOINTMENT (OUTPATIENT)
Dept: CARDIOLOGY | Facility: CLINIC | Age: 63
End: 2023-07-12
Payer: MEDICARE

## 2023-07-12 PROCEDURE — 78452 HT MUSCLE IMAGE SPECT MULT: CPT

## 2023-07-12 PROCEDURE — A9500: CPT

## 2023-07-12 PROCEDURE — 93015 CV STRESS TEST SUPVJ I&R: CPT

## 2023-07-13 ENCOUNTER — RX RENEWAL (OUTPATIENT)
Age: 63
End: 2023-07-13

## 2023-07-14 ENCOUNTER — EMERGENCY (EMERGENCY)
Facility: HOSPITAL | Age: 63
LOS: 1 days | Discharge: HOME | End: 2023-07-14
Attending: INTERNAL MEDICINE | Admitting: EMERGENCY MEDICINE
Payer: MEDICARE

## 2023-07-14 VITALS
TEMPERATURE: 99 F | RESPIRATION RATE: 16 BRPM | WEIGHT: 160.06 LBS | HEIGHT: 63 IN | DIASTOLIC BLOOD PRESSURE: 78 MMHG | OXYGEN SATURATION: 98 % | SYSTOLIC BLOOD PRESSURE: 127 MMHG | HEART RATE: 72 BPM

## 2023-07-14 DIAGNOSIS — Z98.89 OTHER SPECIFIED POSTPROCEDURAL STATES: Chronic | ICD-10-CM

## 2023-07-14 DIAGNOSIS — Z98.51 TUBAL LIGATION STATUS: Chronic | ICD-10-CM

## 2023-07-14 PROCEDURE — 93010 ELECTROCARDIOGRAM REPORT: CPT | Mod: 76

## 2023-07-14 PROCEDURE — 99285 EMERGENCY DEPT VISIT HI MDM: CPT

## 2023-07-14 RX ORDER — SERTRALINE 25 MG/1
25 TABLET, FILM COATED ORAL
Qty: 30 | Refills: 2 | Status: DISCONTINUED | COMMUNITY
Start: 2023-07-06 | End: 2023-07-14

## 2023-07-14 NOTE — ED ADULT TRIAGE NOTE - CHIEF COMPLAINT QUOTE
Patient brought in by ambulance from home complaining of chest pain on and off since 10 am was given  m; Nitro Sl x1 tab

## 2023-07-15 VITALS
RESPIRATION RATE: 18 BRPM | DIASTOLIC BLOOD PRESSURE: 80 MMHG | OXYGEN SATURATION: 97 % | SYSTOLIC BLOOD PRESSURE: 117 MMHG | HEART RATE: 63 BPM | TEMPERATURE: 99 F

## 2023-07-15 LAB
ALBUMIN SERPL ELPH-MCNC: 4.1 G/DL — SIGNIFICANT CHANGE UP (ref 3.3–5)
ALP SERPL-CCNC: 68 U/L — SIGNIFICANT CHANGE UP (ref 40–120)
ALT FLD-CCNC: 23 U/L — SIGNIFICANT CHANGE UP (ref 12–78)
ANION GAP SERPL CALC-SCNC: 4 MMOL/L — LOW (ref 5–17)
AST SERPL-CCNC: 11 U/L — LOW (ref 15–37)
BASOPHILS # BLD AUTO: 0.04 K/UL — SIGNIFICANT CHANGE UP (ref 0–0.2)
BASOPHILS NFR BLD AUTO: 0.3 % — SIGNIFICANT CHANGE UP (ref 0–2)
BILIRUB SERPL-MCNC: 0.4 MG/DL — SIGNIFICANT CHANGE UP (ref 0.2–1.2)
BUN SERPL-MCNC: 10 MG/DL — SIGNIFICANT CHANGE UP (ref 7–23)
CALCIUM SERPL-MCNC: 9.6 MG/DL — SIGNIFICANT CHANGE UP (ref 8.5–10.1)
CHLORIDE SERPL-SCNC: 109 MMOL/L — HIGH (ref 96–108)
CO2 SERPL-SCNC: 28 MMOL/L — SIGNIFICANT CHANGE UP (ref 22–31)
CREAT SERPL-MCNC: 0.65 MG/DL — SIGNIFICANT CHANGE UP (ref 0.5–1.3)
D DIMER BLD IA.RAPID-MCNC: <150 NG/ML DDU — SIGNIFICANT CHANGE UP
EGFR: 98 ML/MIN/1.73M2 — SIGNIFICANT CHANGE UP
EOSINOPHIL # BLD AUTO: 0.42 K/UL — SIGNIFICANT CHANGE UP (ref 0–0.5)
EOSINOPHIL NFR BLD AUTO: 3.6 % — SIGNIFICANT CHANGE UP (ref 0–6)
GLUCOSE SERPL-MCNC: 147 MG/DL — HIGH (ref 70–99)
HCT VFR BLD CALC: 41.1 % — SIGNIFICANT CHANGE UP (ref 34.5–45)
HGB BLD-MCNC: 13 G/DL — SIGNIFICANT CHANGE UP (ref 11.5–15.5)
IMM GRANULOCYTES NFR BLD AUTO: 0.3 % — SIGNIFICANT CHANGE UP (ref 0–0.9)
LYMPHOCYTES # BLD AUTO: 2.82 K/UL — SIGNIFICANT CHANGE UP (ref 1–3.3)
LYMPHOCYTES # BLD AUTO: 24 % — SIGNIFICANT CHANGE UP (ref 13–44)
MCHC RBC-ENTMCNC: 29.1 PG — SIGNIFICANT CHANGE UP (ref 27–34)
MCHC RBC-ENTMCNC: 31.6 GM/DL — LOW (ref 32–36)
MCV RBC AUTO: 91.9 FL — SIGNIFICANT CHANGE UP (ref 80–100)
MONOCYTES # BLD AUTO: 0.85 K/UL — SIGNIFICANT CHANGE UP (ref 0–0.9)
MONOCYTES NFR BLD AUTO: 7.2 % — SIGNIFICANT CHANGE UP (ref 2–14)
NEUTROPHILS # BLD AUTO: 7.57 K/UL — HIGH (ref 1.8–7.4)
NEUTROPHILS NFR BLD AUTO: 64.6 % — SIGNIFICANT CHANGE UP (ref 43–77)
NRBC # BLD: 0 /100 WBCS — SIGNIFICANT CHANGE UP (ref 0–0)
NT-PROBNP SERPL-SCNC: 44 PG/ML — SIGNIFICANT CHANGE UP (ref 0–125)
PLATELET # BLD AUTO: 304 K/UL — SIGNIFICANT CHANGE UP (ref 150–400)
POTASSIUM SERPL-MCNC: 4 MMOL/L — SIGNIFICANT CHANGE UP (ref 3.5–5.3)
POTASSIUM SERPL-SCNC: 4 MMOL/L — SIGNIFICANT CHANGE UP (ref 3.5–5.3)
PROT SERPL-MCNC: 7.9 G/DL — SIGNIFICANT CHANGE UP (ref 6–8.3)
RAPID RVP RESULT: SIGNIFICANT CHANGE UP
RBC # BLD: 4.47 M/UL — SIGNIFICANT CHANGE UP (ref 3.8–5.2)
RBC # FLD: 14 % — SIGNIFICANT CHANGE UP (ref 10.3–14.5)
SARS-COV-2 RNA SPEC QL NAA+PROBE: SIGNIFICANT CHANGE UP
SODIUM SERPL-SCNC: 141 MMOL/L — SIGNIFICANT CHANGE UP (ref 135–145)
TROPONIN I, HIGH SENSITIVITY RESULT: 4.1 NG/L — SIGNIFICANT CHANGE UP
TROPONIN I, HIGH SENSITIVITY RESULT: 4.8 NG/L — SIGNIFICANT CHANGE UP
WBC # BLD: 11.74 K/UL — HIGH (ref 3.8–10.5)
WBC # FLD AUTO: 11.74 K/UL — HIGH (ref 3.8–10.5)

## 2023-07-15 PROCEDURE — 83880 ASSAY OF NATRIURETIC PEPTIDE: CPT

## 2023-07-15 PROCEDURE — 71045 X-RAY EXAM CHEST 1 VIEW: CPT

## 2023-07-15 PROCEDURE — 85025 COMPLETE CBC W/AUTO DIFF WBC: CPT

## 2023-07-15 PROCEDURE — 99285 EMERGENCY DEPT VISIT HI MDM: CPT | Mod: 25

## 2023-07-15 PROCEDURE — 85379 FIBRIN DEGRADATION QUANT: CPT

## 2023-07-15 PROCEDURE — 71275 CT ANGIOGRAPHY CHEST: CPT | Mod: MA

## 2023-07-15 PROCEDURE — 71045 X-RAY EXAM CHEST 1 VIEW: CPT | Mod: 26

## 2023-07-15 PROCEDURE — 36415 COLL VENOUS BLD VENIPUNCTURE: CPT

## 2023-07-15 PROCEDURE — 93010 ELECTROCARDIOGRAM REPORT: CPT

## 2023-07-15 PROCEDURE — 71275 CT ANGIOGRAPHY CHEST: CPT | Mod: 26,MA

## 2023-07-15 PROCEDURE — 84484 ASSAY OF TROPONIN QUANT: CPT

## 2023-07-15 PROCEDURE — 93005 ELECTROCARDIOGRAM TRACING: CPT

## 2023-07-15 PROCEDURE — 99285 EMERGENCY DEPT VISIT HI MDM: CPT

## 2023-07-15 PROCEDURE — 0225U NFCT DS DNA&RNA 21 SARSCOV2: CPT

## 2023-07-15 PROCEDURE — 80053 COMPREHEN METABOLIC PANEL: CPT

## 2023-07-15 RX ORDER — VALSARTAN 80 MG/1
1 TABLET ORAL
Qty: 30 | Refills: 0
Start: 2023-07-15

## 2023-07-15 NOTE — ED PROVIDER NOTE - SIGNIFICANT NEGATIVE FINDINGS
no headache, no exertional chest pain, no SOB, no palpitations, no n/v/d, no urinary symptoms, no bleeding. no neuro changes.

## 2023-07-15 NOTE — ED PROVIDER NOTE - PATIENT PORTAL LINK FT
You can access the FollowMyHealth Patient Portal offered by [facility] by registering at the following website: [email or web address]. By joining [facility]’s FollowMyHealth portal, you will also be able to view your health information using other applications (apps) compatible with our system. You can access the FollowMyHealth Patient Portal offered by [facility] by registering at the following website: [email or web address]. By joining [facility]’s FollowMyHealth portal, you will also be able to view your health information using other applications (apps) compatible with our system.

## 2023-07-15 NOTE — ED PROVIDER NOTE - OBJECTIVE STATEMENT
CP SOB stress test 2 days ago was normal 62 y/o female PMH HTN C/C  the patient is experiencing intermittent episodes of  Chest pressure and  SOB that is usually associated with her blood pressure being elevated , her  notes that the BP was  200mmHg and 170 mmHg,  the past couple of mornings.  The patient had a   stress test 2 days ago at Dr Garcia office that  was normal.

## 2023-07-15 NOTE — ED PROVIDER NOTE - ASSOCIATED SYMPTOMS
intermittent episodes of  Chest pressure and  SOB that is usually associated with her blood pressure being elevated

## 2023-07-15 NOTE — ED ADULT NURSE NOTE - OBJECTIVE STATEMENT
received pt from outgoing RN resting in stretcher at 07:10am.   Pt alert and oriented x 4, NSR on cardiac monitor.  Pt denies cp/sob/palpitations.   Pt in no acute distress at this time.  Skin warm dry intact cardio preivously in to assess pt. BN

## 2023-07-15 NOTE — ED PROVIDER NOTE - NSFOLLOWUPINSTRUCTIONS_ED_ALL_ED_FT
Chest Pain    Chest pain can be caused by many different conditions which may or may not be dangerous. Causes include heartburn, lung infections, heart attack, blood clot in lungs, skin infections, strain or damage to muscle, cartilage, or bones, etc. In addition to a history and physical examination, an electrocardiogram (ECG) or other lab tests may have been performed to determine the cause of your chest pain. Follow up with your primary care provider or with a cardiologist as instructed.     SEEK IMMEDIATE MEDICAL CARE IF YOU HAVE ANY OF THE FOLLOWING SYMPTOMS: worsening chest pain, coughing up blood, unexplained back/neck/jaw pain, severe abdominal pain, dizziness or lightheadedness, fainting, shortness of breath, sweaty or clammy skin, vomiting, or racing heart beat. These symptoms may represent a serious problem that is an emergency. Do not wait to see if the symptoms will go away. Get medical help right away. Call 911 and do not drive yourself to the hospital. STOP LISINOPRIL  START VALSARTAN  FOLLOW UP WITH CARDIOLOGY    Chest Pain    Chest pain can be caused by many different conditions which may or may not be dangerous. Causes include heartburn, lung infections, heart attack, blood clot in lungs, skin infections, strain or damage to muscle, cartilage, or bones, etc. In addition to a history and physical examination, an electrocardiogram (ECG) or other lab tests may have been performed to determine the cause of your chest pain. Follow up with your primary care provider or with a cardiologist as instructed.     SEEK IMMEDIATE MEDICAL CARE IF YOU HAVE ANY OF THE FOLLOWING SYMPTOMS: worsening chest pain, coughing up blood, unexplained back/neck/jaw pain, severe abdominal pain, dizziness or lightheadedness, fainting, shortness of breath, sweaty or clammy skin, vomiting, or racing heart beat. These symptoms may represent a serious problem that is an emergency. Do not wait to see if the symptoms will go away. Get medical help right away. Call 911 and do not drive yourself to the hospital.

## 2023-07-15 NOTE — CONSULT NOTE ADULT - SUBJECTIVE AND OBJECTIVE BOX
[facility] - Tucker Harding Pannella, Patel, Savella  Office Number: [phone]    Initial Consult Note    CHIEF COMPLAINT: Patient is a 63y old  Female who presents with a chief complaint of chest pain    HPI:  64 y/o female PMH HTN, asthma C/C  the patient is experiencing intermittent episodes of Chest pressure and SOB that is usually associated with her blood pressure being elevated, her  noted that the BP was  200mmHg and 170 mmHg,  the past couple of mornings.    She is feeling well during my exam.  I last saw her in the office in 4/23  echo and pharm stress test normal last week  BP has been elevated, despite lisinopril, amlodipine and metoprolol      PAST MEDICAL & SURGICAL HISTORY:  Hypertension      Calculus of kidney      Hydronephrosis, unspecified hydronephrosis type      Asthma      GERD (gastroesophageal reflux disease)      HLD (hyperlipidemia)      Impaired fasting glucose      DM (diabetes mellitus)      Herpes simplex      Hydronephrosis  right      Thyroid activity decreased      H/O tubal ligation      S/P LASIK surgery  left eye      H/O lithotripsy          SOCIAL HISTORY:  No tobacco, ethanol, or drug abuse.    FAMILY HISTORY:  Family history of asthma (Mother)      No family history of acute MI or sudden cardiac death.    MEDICATIONS  (STANDING):    MEDICATIONS  (PRN):      Allergies    Cipro (Unknown)    Intolerances        REVIEW OF SYSTEMS:    CONSTITUTIONAL: No weakness, fevers or chills  EYES/ENT: No visual changes;  No vertigo or throat pain   NECK: No pain or stiffness  RESPIRATORY: No cough, wheezing, hemoptysis; No shortness of breath  CARDIOVASCULAR: reports chest pain, no palpitations  GASTROINTESTINAL: No abdominal pain. No nausea, vomiting, or hematemesis; No diarrhea or constipation. No melena or hematochezia.  GENITOURINARY: No dysuria, frequency or hematuria  NEUROLOGICAL: No numbness or weakness  SKIN: No itching or rash  All other review of systems is negative unless indicated above    VITAL SIGNS:   Vital Signs Last 24 Hrs  T(C): 37 (15 Jul 2023 07:30), Max: 37.1 (14 Jul 2023 23:55)  T(F): 98.6 (15 Jul 2023 07:30), Max: 98.8 (14 Jul 2023 23:55)  HR: 63 (15 Jul 2023 07:30) (55 - 72)  BP: 117/80 (15 Jul 2023 07:30) (117/80 - 139/81)  BP(mean): --  RR: 18 (15 Jul 2023 07:30) (16 - 18)  SpO2: 97% (15 Jul 2023 07:30) (97% - 98%)    Parameters below as of 15 Jul 2023 07:30  Patient On (Oxygen Delivery Method): room air        I&O's Summary      On Exam:    Constitutional: NAD, alert and oriented x 3  Lungs:  Non-labored, breath sounds are clear bilaterally, No wheezing, rales or rhonchi  Cardiovascular: RRR.  S1 and S2 positive.  No murmurs, rubs, gallops or clicks  Gastrointestinal: Bowel Sounds present, soft, nontender.   Lymph: No peripheral edema. No cervical lymphadenopathy.  Neurological: Alert, no focal deficits  Skin: No rashes or ulcers   Psych:  Mood & affect appropriate.    LABS: All Labs Reviewed:                        13.0   11.74 )-----------( 304      ( 15 Jul 2023 01:05 )             41.1     15 Jul 2023 01:05    141    |  109    |  10     ----------------------------<  147    4.0     |  28     |  0.65     Ca    9.6        15 Jul 2023 01:05    TPro  7.9    /  Alb  4.1    /  TBili  0.4    /  DBili  x      /  AST  11     /  ALT  23     /  AlkPhos  68     15 Jul 2023 01:05          Blood Culture:         RADIOLOGY:    EKG: sb

## 2023-07-15 NOTE — CONSULT NOTE ADULT - ASSESSMENT
Felicia is a 66 year old female with HTN, asthma, here with chest pain and elevated blood pressure.    - discomfort presumably related to uncontrolled htn and stress  - ekg without evidence of ischemia  - troponins are negative x 2  - had pharm stress and echo in our office last week that were normal  - has been on lisinopril more recently, without improvement in her BP.  - will change this to valsartan 160. She will remain on amlodipine and metoprolol  - if feeling well and bp ok, can dc home. She will fu in the office in 2 weeks. If pain is not resolved with BP control, she will have a coronary cta down the road.  - dw  and patient in detail.

## 2023-07-17 ENCOUNTER — RX RENEWAL (OUTPATIENT)
Age: 63
End: 2023-07-17

## 2023-07-20 ENCOUNTER — APPOINTMENT (OUTPATIENT)
Dept: CARDIOLOGY | Facility: CLINIC | Age: 63
End: 2023-07-20
Payer: MEDICARE

## 2023-07-20 ENCOUNTER — NON-APPOINTMENT (OUTPATIENT)
Age: 63
End: 2023-07-20

## 2023-07-20 VITALS
HEIGHT: 63 IN | SYSTOLIC BLOOD PRESSURE: 139 MMHG | BODY MASS INDEX: 27.46 KG/M2 | OXYGEN SATURATION: 96 % | DIASTOLIC BLOOD PRESSURE: 82 MMHG | HEART RATE: 80 BPM | WEIGHT: 155 LBS

## 2023-07-20 PROCEDURE — 99214 OFFICE O/P EST MOD 30 MIN: CPT | Mod: 25

## 2023-07-20 PROCEDURE — 93000 ELECTROCARDIOGRAM COMPLETE: CPT

## 2023-07-20 RX ORDER — LISINOPRIL 5 MG/1
5 TABLET ORAL
Qty: 30 | Refills: 0 | Status: DISCONTINUED | COMMUNITY
Start: 2023-06-09 | End: 2023-07-20

## 2023-07-20 NOTE — HISTORY OF PRESENT ILLNESS
[FreeTextEntry1] : 63 year old woman with history of HTN, DM, asthma, anxiety presents for a followup visit. \par \par She saw my partner Dr. Tim alfonso. \par She is complaining of significant dyspnea even when sitting. She notes that her symptoms have improved with \par her lexapro and xanax. \par She   denies any chest pain, PND, orthopnea, lower extremity edema, near syncope, syncope, strokelike symptoms. Medication reconciliation performed. She is compliant with her medications.

## 2023-07-20 NOTE — DISCUSSION/SUMMARY
[FreeTextEntry1] : 63 year woman with a history as listed presents for a followup cardiac evaluation. \par Felicia is complaining of more dyspnea. I think her symptoms are related to underlying anxiety. She denies any anginal symptoms. Clinically she is euvolemic on exam.  Her EKG is unchanged from previous. Her most recent stress and echo was unrevealing. She will increase her Lexapro to 10mg day and fu with her primary. \par She will increase her toprol to 50mg Qday for anxiolytic effect and bp control. She will continue Norvasc 5mg Qday. .\par her triglycerides are elevated and her LDL is normal. She is vegetarian and does not want fish oil. She will start fenofribrate 145mg Qday.  repeat cmp and lipids in 3-4 months. \par Exercise and diet counseling was performed in order to reduce her future cardiovascular risk. She will followup with me in 6 months or sooner if necessary.  [EKG obtained to assist in diagnosis and management of assessed problem(s)] : EKG obtained to assist in diagnosis and management of assessed problem(s)

## 2023-07-20 NOTE — CARDIOLOGY SUMMARY
[de-identified] : SR AWMI TWI c/w anterior ischemia.  [de-identified] : 7/13/23 pharm normal SPECT  [de-identified] : 7/14/23 normal LV function

## 2023-08-04 ENCOUNTER — APPOINTMENT (OUTPATIENT)
Dept: PULMONOLOGY | Facility: CLINIC | Age: 63
End: 2023-08-04

## 2023-08-10 ENCOUNTER — APPOINTMENT (OUTPATIENT)
Dept: INTERNAL MEDICINE | Facility: CLINIC | Age: 63
End: 2023-08-10
Payer: MEDICARE

## 2023-08-10 ENCOUNTER — RX RENEWAL (OUTPATIENT)
Age: 63
End: 2023-08-10

## 2023-08-10 PROCEDURE — 96372 THER/PROPH/DIAG INJ SC/IM: CPT

## 2023-08-10 RX ORDER — BUDESONIDE AND FORMOTEROL FUMARATE DIHYDRATE 160; 4.5 UG/1; UG/1
160-4.5 AEROSOL RESPIRATORY (INHALATION) TWICE DAILY
Qty: 1 | Refills: 2 | Status: ACTIVE | COMMUNITY
Start: 2023-01-17 | End: 1900-01-01

## 2023-08-10 RX ADMIN — CYANOCOBALAMIN 0 MCG/ML: 1000 INJECTION INTRAMUSCULAR; SUBCUTANEOUS at 00:00

## 2023-08-11 RX ORDER — CYANOCOBALAMIN 1000 UG/ML
1000 INJECTION INTRAMUSCULAR; SUBCUTANEOUS
Qty: 0 | Refills: 0 | Status: COMPLETED | OUTPATIENT
Start: 2023-08-10

## 2023-08-14 RX ORDER — METOPROLOL SUCCINATE 50 MG/1
50 TABLET, EXTENDED RELEASE ORAL DAILY
Qty: 90 | Refills: 1 | Status: DISCONTINUED | COMMUNITY
End: 2023-08-14

## 2023-08-31 ENCOUNTER — RX RENEWAL (OUTPATIENT)
Age: 63
End: 2023-08-31

## 2023-09-08 ENCOUNTER — APPOINTMENT (OUTPATIENT)
Dept: INTERNAL MEDICINE | Facility: CLINIC | Age: 63
End: 2023-09-08
Payer: MEDICARE

## 2023-09-08 PROCEDURE — 96372 THER/PROPH/DIAG INJ SC/IM: CPT

## 2023-09-08 RX ORDER — CYANOCOBALAMIN 1000 UG/ML
1000 INJECTION INTRAMUSCULAR; SUBCUTANEOUS
Qty: 0 | Refills: 0 | Status: COMPLETED | OUTPATIENT
Start: 2023-09-08

## 2023-09-08 RX ADMIN — CYANOCOBALAMIN 0 MCG/ML: 1000 INJECTION INTRAMUSCULAR; SUBCUTANEOUS at 00:00

## 2023-09-28 NOTE — ED ADULT TRIAGE NOTE - NS ED NURSE BANDS TYPE
[facility]  [address] [phone]  September 29, 2023     Referring Doctor: Nevin Schaumann, MD  Primary Doctor: Miguelito Berkowitz MD  Date of Service: 9/29/2023    Identification:   Ms. Rachel Cary is a 69year old right-handed female     Reason for Consultation: This patient is being seen for: Office Visit. Pain is related to   Chief Complaint   Patient presents with   â¢ Consultation     Low back pain        HISTORY OF PRESENT ILLNESS:  Rachel Cary is a 69year old female who is being seen today in the [address] [place] [address] and Spine clinic for physiatric evaluation and management. Rachel Cary presents with primary complaints of chronic lower back pain which started during the course of 2022 2nd to no particular event. Her symptoms have been slowly worsening over time. She was referred to our clinic for further evaluation.    Â· Location: lower back: right > left, localized  Â· Duration: constant   Â· Severity: varies from 3-7/10  Â· Quality: achey, shooting, stabbing  Â· Time of day when pain is usually worse: No particular time  Â· Alleviating factors: walking   Â· Exacerbating factors: bending and standing    Â· Significant recent change with bladder and/or bowel control - No  Â· Presence of weakness: none in lower extremities   Â· Presence of paresthesias: none in lower extremities  Â· Presence of spasms: none   Â· Presence of falls or lack of balance: none   Â· Presence of recent unintended weight loss or fever: none   Â· Functional Impairments 2nd to symptoms: exercising and outdoor gardening   Â· Involved in a legal process regarding current symptoms - No  Â· Workman's compensation - No    Â· Is the patient on any blood thinners other than aspirin (i.e. Coumadin or Plavix) - No  Â· Is the patient aware of any contrast dye allergy - No         PREVIOUS/CURRENT TREATMENTS FOR CURRENT SYMPTOMS:  Â· Medications: mobic 15 mg qd, tizanidine 4 mg at bedtime, tylenol 500 mg 2 tabs qd prn   -Modalities:  Â· Physical Therapy - No  Â· Chiropractic - No  Â· Acupuncture - No  Â· Massage - Yes-2023  Procedures/Surgical intervention  Â· Epidural/spinal injection - No  Â· Spine surgery for current symptoms - No      REVIEW OF SYSTEMS   CONSTITUTIONAL: No fever, weight changes, fatigue or drowsiness. EYE: No blurred vision, irritation or tearing. ENT: No soreness or difficulty swallowing . CARDIOVASCULAR: No chest pain, palpitations or syncope. RESPIRATORY: No cough, shortness of breath or wheezing. GASTROINTESTINAL: No constipation, diarrhea, cramping, nausea, or vomiting. Waynetta Divine GENITOURINARY: occasional episodes of bladder incontinence, no urgency, or hesitancy. MUSCULOSKELETAL: No weakness, No joint pain   INTEGUMENTARY: No hypersensitivity, discoloration, or rash. NEURO: No cognitive deficits, No sensory deficits  ENDOCRINE: No temperature intolerance, polyuria, or polydipsia. HEME/LYMPH: No easy bruising, bleeding tendencies, lymphadenopathy. ALLERGY: As noted herein. PSYCHIATRIC: No cognitive deficits, chronic symptoms of anxiety or depression. A complete review of systems was performed, all others negative except as noted above. ALLERGIES:   No Known Allergies     MEDICATIONS:  Outpatient Medications Marked as Taking for the 9/28/23 encounter (Office Visit) with Gio Rahman MD   Medication Sig Dispense Refill   â¢ meloxicam (MOBIC) 15 MG tablet Take 1 tablet by mouth daily. 30 tablet 1   â¢ tiZANidine (ZANAFLEX) 4 MG tablet Take 1 tablet by mouth at bedtime. 15 tablet 0   â¢  compounded derm cream IVERMECTIN 1%/NIACINAMIDE 4% cream.  Apply thin layer to entire face at bedtime. For treatment of rosacea. 30 g 1   â¢ buPROPion XL (WELLBUTRIN XL) 150 MG 24 hr tablet Take 3 tablets by mouth daily. 270 tablet 1   â¢ LamoTRIgine (LaMICtal) 200 MG tablet Take 1 tablet by mouth daily.  90 tablet 1   â¢ ivermectin (Soolantra) 1 % cream Apply thin layer to entire face at bedtime. For treatment of rosacea. 30 g 1   â¢ biest 0.62 MG/ML cream (estradiol-estriol 20:80 in natural base) Insert 0.5 grams vaginally every night for two weeks. Then insert 0.5 grams 2-3 times per week thereafter. 30 g 11   â¢ pantoprazole (PROTONIX) 40 MG tablet Take one tablet by mouth daily 90 tablet 0   â¢ turmeric 500 MG capsule Take 3,000 mg by mouth daily. â¢ Ascorbic Acid (vitamin C) 1000 MG tablet Take 1,000 mg by mouth daily. â¢ atorvastatin (LIPITOR) 20 MG tablet Take 1 tablet by mouth daily. 90 tablet 1   â¢ Glucosamine HCl (GLUCOSAMINE PO)      â¢ VITAMIN D PO Take by mouth daily. â¢ VITAMIN E PO Take by mouth daily.           PAST MEDICAL HISTORY:  Past Medical History:   Diagnosis Date   â¢ Depressive disorder    â¢ Dysosmia    â¢ Elevated cholesterol    â¢ Gastro-oesophageal reflux disease        PAST SURGICAL HISTORY:     Past Surgical History:   Procedure Laterality Date   â¢ Cyst removal     â¢ Mammo stereotactic biopsy right Right 12/01/2010    benign    not done at [place]   â¢ Open access colonoscopy  02/09/2023    Dr. Steve Camarena colonoscopy recall 5 years   â¢ Removal of tonsils,10+ y/o          FAMILY HISTORY:  Family History   Problem Relation Age of Onset   â¢ Cancer, Breast Mother 54   â¢ Myocardial Infarction Mother    â¢ Cancer Mother    â¢ Heart disease Mother    â¢ Cancer, Prostate Father 72   â¢ COPD Father    â¢ Hypertension Father    â¢ Diabetes Sister    â¢ Kidney disease Sister         Pallister Ye Syndrome, renal glucosuria with salt wasting   â¢ Cancer, Breast Sister 62   â¢ BRCA1 Negative Sister         negative 47-gene panel (2020)   â¢ BRCA2 Negative Sister    â¢ Cancer, Breast Maternal Grandmother    â¢ Heart disease Maternal Grandmother    â¢ Cancer, Breast Other         maternal half-aunt (mother's maternal half-sister)   â¢ Cancer, Pancreatic Other    â¢ Cancer, Breast Paternal Cousin    â¢ Cancer, Lung Paternal Cousin    â¢ Cancer, Prostate Paternal Uncle "metastatic       SOCIAL HISTORY:  Social History     Tobacco Use   â¢ Smoking status: Former     Current packs/day: 0.00     Types: Cigarettes     Quit date: 2006     Years since quittin.8   â¢ Smokeless tobacco: Never   Vaping Use   â¢ Vaping Use: never used   Substance Use Topics   â¢ Alcohol use: Yes     Alcohol/week: 10.0 standard drinks of alcohol     Types: 10 Glasses of wine per week     Comment: 2-3 drinks of wine daily   â¢ Drug use: No     She is independent with all basic (eating, bathing, grooming, toileting, dressing) and advanced activities of daily living. She ambulates without an assistive device independently. Living situation: The patient lives with:  spouse in a 2 floor house. Work status: Retired  Does the patient have a history of drug or alcohol abuse - No.  Social History     Tobacco Use   Smoking Status Former   â¢ Current packs/day: 0.00   â¢ Types: Cigarettes   â¢ Quit date: 2006   â¢ Years since quittin.8   Smokeless Tobacco Never      Social History     Substance and Sexual Activity   Alcohol Use Yes   â¢ Alcohol/week: 10.0 standard drinks of alcohol   â¢ Types: 10 Glasses of wine per week    Comment: 2-3 drinks of wine daily        PHYSICAL EXAMINATION:  VITALS:  Vitals:    23 1040   Pulse: 72   Weight: 65.3 kg (144 lb)   Height: 5' 6"" (1.676 m)   PainSc: 5    PainLoc: Back      General:  69year old female who appears healthy, cooperative, and in no acute distress. HEENT: Atraumatic and normocephalic. Skin: Negative for scars, lesions, ecchymosis  Cardiovascular:Peripheral pulses are palpable bilaterally and negative for pedal edema bilaterally,. No evidence of venous stasis or varicose veins  Pulmonary: Patient is breathing comfortably.   Abdomen: Soft, nontender and nondistended  Psychiatric: Awake, alert, oriented * 3, proper judgment and insight   Neurological: Sensation intact to light touch throughout extremities, reflexes- +2/2 for biceps, brachioradialis, " patellar and achilles tendon reflex bilaterally. Straight leg raising test on right and left: negative  Lumbar spurling maneuver on the right and the left: negative    Musculoskeletal: Upper extremities = shoulder abduction - 5/5, elbow flexion - 5/5, elbow extension- 5/5, wrist extension - 5/5, hand intrinsics - 5/5            Lower extremities= hip flexion - 5/5, knee extension - 5/5, knee flexion - 5/5, dorsiflexion - 5/5, plantarflexion - 5/5            Lumbar ROM: Flexion: 80-90 degrees  Extension: 20 degrees             Shayan's Exam: negative             Lumbar Facet Challenge: positive on the left and the right             Lumbar Paraspinal Tenderness: negative   Gait: Appropriate stride length and base     Diagnostic Studies: The patient had the following diagnostic studies:   XR LUMBAR SPINE 4 TO 5 VIEWS    Result Date: 9/26/2023  Narrative: EXAM: XR LUMBAR SPINE 4 TO 5 VIEWS INDICATION: Other dorsalgia COMPARISON: None. FINDINGS: A minor dextrocurvature is present. The 5 lumbar vertebral bodies are uniform in height. There is minimal anterolisthesis at L3-4. Disc space narrowing is mild at L4-5 and L5-S1. L4-S1 mild to moderate facet arthrosis. Impression: IMPRESSION: 1. L4-S1 mild spondylosis and mild to moderate facet arthrosis. Electronically Signed by: Mariola Castellanos MD Signed on: 9/26/2023 9:44 AM Created on Workstation ID: [id number] Signed on Workstation ID: [id number]      I personally reviewed all applicable diagnostic imaging related to this visit.      Labs Reviewed:  Lab Results   Component Value Date    BUN 17 04/20/2023    BUN 11 12/28/2019    GLUCOSE 100 (H) 04/20/2023    GLUCOSE 85 12/28/2019    CREATININE 0.82 04/20/2023    CREATININE 0.79 12/28/2019    AST 13 04/20/2023    AST 13 12/27/2019    HCT 40.4 09/16/2021    HCT 38.9 12/28/2019       GFR Estimate, Non  (no units)   Date Value   12/28/2019 79     Hemoglobin A1C (%)   Date Value   09/24/2021 5.6     WBC "(K/mcL)   Date Value   09/16/2021 5.8     RBC (mil/mcL)   Date Value   09/16/2021 4.32     HCT (%)   Date Value   09/16/2021 40.4     HGB (g/dL)   Date Value   09/16/2021 13.4     PLT (K/mcL)   Date Value   09/16/2021 275     GOT/AST (Units/L)   Date Value   04/20/2023 13     GPT/ALT (Units/L)   Date Value   04/20/2023 24     No results found for: ""GGTP\""  Alkaline Phosphatase (Units/L)   Date Value   04/20/2023 79     Bilirubin, Total (mg/dL)   Date Value   04/20/2023 0.4           Diagnosis/Impression: This is a 69year old female who presents with a chronic history of lower back pain  impairing her ability to exercise and perform outdoor gardening    1. I did review X-ray images of the lumbar spine with patient which revealed evidence of disc space narrowing and facet arthropathy at lower lumbar levels. I do feel her lower back pain is generated from her lower lumbar facet joints. 2. I will refer to physical therapy for HEP, strengthening, stretching, ROM exercises, myofascial treatment, and dry needling modalities. 3. Return to clinic in 6 weeks. If her symptoms do not improve following participation in her PT regimen, I will consider an MRI of the lumbar spine. Miscellaneous:  Smoking cessation education: was not necessary as patient is not an active smoker. Additionally, Sandra Sidhu was advised to maintain normal activities and was advised against bedrest.  Counseling was provided regarding her condition and plan of care and the need to be re-evaluated urgently for any new onset of bowel or bladder incontinence, weakness or severe pain. I will follow-up with her in about 6 weeks to evaluate her progress; or anytime sooner for any progressive or new symptoms. All questions were addressed with the patient and she verbalizes understanding and agreement with the plan of care as described above.   She will contact me with any additional concerns that arise prior to her  next " appointment. Is this patient being prescribed opioids by the [facility]: No  Is patient receiving opioid medication from another provider: No  [facility] Reviewed: Yes    Medications, Therapy, and other Orders Assigned at this Clinical Visit:  Orders Placed This Encounter   â¢ SERVICE TO PHYSICAL THERAPY       The treatment plan was fully discussed and agreed upon with patient. All questions were answered. PLAN REFERRED CLINIC: A copy of the consultation note has been sent to the requesting provider - Clarisa Shore MD. Thank you for allowing me to take part in the care of Ms. Mcdaniels. If you have any questions, please feel free to contact me. Program status: Continue in Back and Spine Program    ------------------------------------------------------------------------------------------------------------          Education:  See patient instructions per AVS (after visit summary). Thank you for the referral.  Please contact me at any time if you have questions regarding her care. The consultation report was sent back to the requesting provider.     Cc: MD Namrata Strickland MD Name band;

## 2023-10-05 ENCOUNTER — APPOINTMENT (OUTPATIENT)
Dept: INTERNAL MEDICINE | Facility: CLINIC | Age: 63
End: 2023-10-05
Payer: MEDICARE

## 2023-10-05 PROCEDURE — 96372 THER/PROPH/DIAG INJ SC/IM: CPT

## 2023-10-05 RX ORDER — CYANOCOBALAMIN 1000 UG/ML
1000 INJECTION INTRAMUSCULAR; SUBCUTANEOUS
Qty: 0 | Refills: 0 | Status: COMPLETED | OUTPATIENT
Start: 2023-10-05

## 2023-10-05 RX ADMIN — CYANOCOBALAMIN 0 MCG/ML: 1000 INJECTION INTRAMUSCULAR; SUBCUTANEOUS at 00:00

## 2023-10-25 NOTE — ED PROVIDER NOTE - PROGRESS NOTE DETAILS
Tried to call mom to juany a well visit but couldn't get hold of her.  Left VM to call the office back
seen by cardiology - stop the lisnopril and start valsartan 160 qd  rx sent to follow up

## 2023-10-31 ENCOUNTER — RX RENEWAL (OUTPATIENT)
Age: 63
End: 2023-10-31

## 2023-10-31 ENCOUNTER — APPOINTMENT (OUTPATIENT)
Dept: INTERNAL MEDICINE | Facility: CLINIC | Age: 63
End: 2023-10-31
Payer: MEDICARE

## 2023-10-31 VITALS
SYSTOLIC BLOOD PRESSURE: 122 MMHG | TEMPERATURE: 98.6 F | BODY MASS INDEX: 28.53 KG/M2 | OXYGEN SATURATION: 97 % | HEIGHT: 63 IN | HEART RATE: 62 BPM | DIASTOLIC BLOOD PRESSURE: 70 MMHG | RESPIRATION RATE: 14 BRPM | WEIGHT: 161 LBS

## 2023-10-31 DIAGNOSIS — Z23 ENCOUNTER FOR IMMUNIZATION: ICD-10-CM

## 2023-10-31 DIAGNOSIS — J45.901 UNSPECIFIED ASTHMA WITH (ACUTE) EXACERBATION: ICD-10-CM

## 2023-10-31 PROCEDURE — G0008: CPT

## 2023-10-31 PROCEDURE — 90662 IIV NO PRSV INCREASED AG IM: CPT

## 2023-10-31 PROCEDURE — G0009: CPT

## 2023-10-31 PROCEDURE — 99214 OFFICE O/P EST MOD 30 MIN: CPT | Mod: 25

## 2023-10-31 PROCEDURE — 90677 PCV20 VACCINE IM: CPT

## 2023-10-31 RX ORDER — BUDESONIDE 90 UG/1
90 AEROSOL, POWDER RESPIRATORY (INHALATION)
Qty: 3 | Refills: 1 | Status: DISCONTINUED | COMMUNITY
Start: 2023-06-15 | End: 2023-10-31

## 2023-10-31 RX ORDER — BLOOD SUGAR DIAGNOSTIC
STRIP MISCELLANEOUS
Qty: 100 | Refills: 2 | Status: ACTIVE | COMMUNITY
Start: 2022-06-27 | End: 1900-01-01

## 2023-10-31 RX ORDER — HYDROCORTISONE 25 MG/G
2.5 CREAM TOPICAL TWICE DAILY
Qty: 1 | Refills: 0 | Status: DISCONTINUED | COMMUNITY
Start: 2023-04-25 | End: 2023-10-31

## 2023-11-02 DIAGNOSIS — E55.9 VITAMIN D DEFICIENCY, UNSPECIFIED: ICD-10-CM

## 2023-11-02 LAB
25(OH)D3 SERPL-MCNC: 18.4 NG/ML
ALBUMIN SERPL ELPH-MCNC: 4.8 G/DL
ALP BLD-CCNC: 77 U/L
ALT SERPL-CCNC: 15 U/L
ANION GAP SERPL CALC-SCNC: 12 MMOL/L
AST SERPL-CCNC: 18 U/L
BASOPHILS # BLD AUTO: 0.03 K/UL
BASOPHILS NFR BLD AUTO: 0.4 %
BILIRUB SERPL-MCNC: 0.4 MG/DL
BUN SERPL-MCNC: 10 MG/DL
CALCIUM SERPL-MCNC: 10 MG/DL
CHLORIDE SERPL-SCNC: 104 MMOL/L
CHOLEST SERPL-MCNC: 210 MG/DL
CO2 SERPL-SCNC: 25 MMOL/L
CREAT SERPL-MCNC: 0.59 MG/DL
EGFR: 100 ML/MIN/1.73M2
EOSINOPHIL # BLD AUTO: 0.47 K/UL
EOSINOPHIL NFR BLD AUTO: 6.1 %
ESTIMATED AVERAGE GLUCOSE: 151 MG/DL
FOLATE SERPL-MCNC: 8.5 NG/ML
GLUCOSE SERPL-MCNC: 113 MG/DL
HBA1C MFR BLD HPLC: 6.9 %
HCT VFR BLD CALC: 43.4 %
HDLC SERPL-MCNC: 44 MG/DL
HGB BLD-MCNC: 14.1 G/DL
IMM GRANULOCYTES NFR BLD AUTO: 0.4 %
LDLC SERPL CALC-MCNC: 110 MG/DL
LYMPHOCYTES # BLD AUTO: 1.97 K/UL
LYMPHOCYTES NFR BLD AUTO: 25.4 %
MAN DIFF?: NORMAL
MCHC RBC-ENTMCNC: 28.4 PG
MCHC RBC-ENTMCNC: 32.5 GM/DL
MCV RBC AUTO: 87.5 FL
MONOCYTES # BLD AUTO: 0.6 K/UL
MONOCYTES NFR BLD AUTO: 7.7 %
NEUTROPHILS # BLD AUTO: 4.66 K/UL
NEUTROPHILS NFR BLD AUTO: 60 %
NONHDLC SERPL-MCNC: 166 MG/DL
PLATELET # BLD AUTO: 299 K/UL
POTASSIUM SERPL-SCNC: 4.5 MMOL/L
PROT SERPL-MCNC: 7.5 G/DL
RBC # BLD: 4.96 M/UL
RBC # FLD: 13.8 %
SODIUM SERPL-SCNC: 141 MMOL/L
TRIGL SERPL-MCNC: 327 MG/DL
TSH SERPL-ACNC: 2.67 UIU/ML
VIT B12 SERPL-MCNC: 486 PG/ML
WBC # FLD AUTO: 7.76 K/UL

## 2023-11-22 NOTE — ED ADULT NURSE NOTE - NS ED NOTE ABUSE SUSPICION NEGLECT YN
I reviewed the H&P, I examined the patient, and there are no changes in the patient's condition.    EP study with possible ablation on 11/22/2023.    
No

## 2024-01-01 LAB
APPEARANCE: CLEAR
BACTERIA UR CULT: ABNORMAL
BACTERIA: NEGATIVE /HPF
BILIRUBIN URINE: NEGATIVE
BLOOD URINE: NEGATIVE
CAST: 0 /LPF
COLOR: YELLOW
EPITHELIAL CELLS: 1 /HPF
GLUCOSE QUALITATIVE U: NEGATIVE MG/DL
KETONES URINE: NEGATIVE MG/DL
LEUKOCYTE ESTERASE URINE: ABNORMAL
MICROSCOPIC-UA: NORMAL
NITRITE URINE: NEGATIVE
PH URINE: 7
PROTEIN URINE: NEGATIVE MG/DL
RED BLOOD CELLS URINE: 0 /HPF
REVIEW: NORMAL
SPECIFIC GRAVITY URINE: 1.01
UROBILINOGEN URINE: 0.2 MG/DL
WHITE BLOOD CELLS URINE: 2 /HPF

## 2024-01-04 DIAGNOSIS — N20.0 CALCULUS OF KIDNEY: ICD-10-CM

## 2024-01-12 ENCOUNTER — RESULT REVIEW (OUTPATIENT)
Age: 64
End: 2024-01-12

## 2024-01-13 ENCOUNTER — APPOINTMENT (OUTPATIENT)
Dept: CT IMAGING | Facility: CLINIC | Age: 64
End: 2024-01-13
Payer: MEDICARE

## 2024-01-13 ENCOUNTER — OUTPATIENT (OUTPATIENT)
Dept: OUTPATIENT SERVICES | Facility: HOSPITAL | Age: 64
LOS: 1 days | End: 2024-01-13
Payer: MEDICARE

## 2024-01-13 DIAGNOSIS — Z98.51 TUBAL LIGATION STATUS: Chronic | ICD-10-CM

## 2024-01-13 DIAGNOSIS — R10.9 UNSPECIFIED ABDOMINAL PAIN: ICD-10-CM

## 2024-01-13 DIAGNOSIS — Z98.89 OTHER SPECIFIED POSTPROCEDURAL STATES: Chronic | ICD-10-CM

## 2024-01-13 DIAGNOSIS — N20.0 CALCULUS OF KIDNEY: ICD-10-CM

## 2024-01-13 PROCEDURE — 74178 CT ABD&PLV WO CNTR FLWD CNTR: CPT

## 2024-01-13 PROCEDURE — 74178 CT ABD&PLV WO CNTR FLWD CNTR: CPT | Mod: 26

## 2024-01-19 ENCOUNTER — APPOINTMENT (OUTPATIENT)
Dept: INTERNAL MEDICINE | Facility: CLINIC | Age: 64
End: 2024-01-19
Payer: MEDICARE

## 2024-01-19 ENCOUNTER — NON-APPOINTMENT (OUTPATIENT)
Age: 64
End: 2024-01-19

## 2024-01-19 VITALS
HEART RATE: 93 BPM | WEIGHT: 163 LBS | DIASTOLIC BLOOD PRESSURE: 80 MMHG | TEMPERATURE: 98.4 F | RESPIRATION RATE: 14 BRPM | HEIGHT: 63 IN | SYSTOLIC BLOOD PRESSURE: 122 MMHG | BODY MASS INDEX: 28.88 KG/M2 | OXYGEN SATURATION: 97 %

## 2024-01-19 DIAGNOSIS — Z71.84 ENC FOR HEALTH COUNSELING RELATED TO TRAVEL: ICD-10-CM

## 2024-01-19 DIAGNOSIS — R07.89 OTHER CHEST PAIN: ICD-10-CM

## 2024-01-19 PROCEDURE — 93000 ELECTROCARDIOGRAM COMPLETE: CPT

## 2024-01-19 PROCEDURE — 99213 OFFICE O/P EST LOW 20 MIN: CPT | Mod: 25

## 2024-01-19 RX ORDER — FENOFIBRATE 145 MG/1
145 TABLET, COATED ORAL DAILY
Qty: 30 | Refills: 3 | Status: ACTIVE | COMMUNITY
Start: 2023-07-20 | End: 1900-01-01

## 2024-01-19 RX ORDER — LANCETS 33 GAUGE
EACH MISCELLANEOUS
Qty: 1 | Refills: 3 | Status: ACTIVE | COMMUNITY
Start: 2023-06-29 | End: 1900-01-01

## 2024-01-19 RX ORDER — AMLODIPINE BESYLATE 5 MG/1
5 TABLET ORAL DAILY
Qty: 90 | Refills: 1 | Status: ACTIVE | COMMUNITY
Start: 2023-07-10 | End: 1900-01-01

## 2024-01-19 NOTE — HISTORY OF PRESENT ILLNESS
[FreeTextEntry8] : 63 year old female who presents with complaint of chest pain intermittently for 3 days.  States it feels like she needs to belch but the gas is not coming out. It is mostly localized to the left chest, and occasionally goes to the left upper back. Relieved with antacid.  Denies palpitations or SOB.

## 2024-01-19 NOTE — PLAN
[FreeTextEntry1] : Appears to be secondary to indigestion  Advised GasX or Mylanta, can take PPI as well PRN  EKG with no acute changes from prior.  F/u with cardio if no improvement.

## 2024-01-21 ENCOUNTER — NON-APPOINTMENT (OUTPATIENT)
Age: 64
End: 2024-01-21

## 2024-03-11 ENCOUNTER — TRANSCRIPTION ENCOUNTER (OUTPATIENT)
Age: 64
End: 2024-03-11

## 2024-03-11 ENCOUNTER — RX RENEWAL (OUTPATIENT)
Age: 64
End: 2024-03-11

## 2024-03-11 RX ORDER — METOPROLOL TARTRATE 25 MG/1
25 TABLET, FILM COATED ORAL
Qty: 180 | Refills: 0 | Status: ACTIVE | COMMUNITY
Start: 2023-08-14 | End: 1900-01-01

## 2024-03-22 NOTE — ED ADULT NURSE NOTE - NS ED NOTE  TALK SOMEONE YN
PREOPERATIVE DIAGNOSES:     1.  24yo  at 39wk5d week intrauterine pregnancy , SROM  2.  arrest of dilation      POSTOPERATIVE DIAGNOSES:     Same.  OP position      PROCEDURE:  primary low transverse  section.      SURGEON:  Sridevi Ram MD      ESTIMATED BLOOD LOSS:  563 mL.      COMPLICATIONS:  None.         ANESTHESIA:  epidural.         FINDINGS:   Information for the patient's :  Sung Deng [[id number]]      .                      Liveborn male infant, OP position,  weight 8#11, apgars 8/9. Placenta 3vc intact. .Normal tubes and ovaries bilaterally.         DETAILS OF PROCEDURE:  See Full Note Dictated        Dictation: [id number]       Sridevi Ram MD     
No

## 2024-05-03 ENCOUNTER — APPOINTMENT (OUTPATIENT)
Dept: INTERNAL MEDICINE | Facility: CLINIC | Age: 64
End: 2024-05-03
Payer: MEDICARE

## 2024-05-03 VITALS
WEIGHT: 158 LBS | BODY MASS INDEX: 28 KG/M2 | OXYGEN SATURATION: 97 % | HEART RATE: 74 BPM | SYSTOLIC BLOOD PRESSURE: 140 MMHG | HEIGHT: 63 IN | TEMPERATURE: 98.7 F | RESPIRATION RATE: 14 BRPM | DIASTOLIC BLOOD PRESSURE: 76 MMHG

## 2024-05-03 DIAGNOSIS — E03.9 HYPOTHYROIDISM, UNSPECIFIED: ICD-10-CM

## 2024-05-03 DIAGNOSIS — N20.0 CALCULUS OF KIDNEY: ICD-10-CM

## 2024-05-03 DIAGNOSIS — R30.0 DYSURIA: ICD-10-CM

## 2024-05-03 DIAGNOSIS — I10 ESSENTIAL (PRIMARY) HYPERTENSION: ICD-10-CM

## 2024-05-03 LAB
BILIRUB UR QL STRIP: NORMAL
CLARITY UR: CLEAR
COLLECTION METHOD: NORMAL
GLUCOSE UR-MCNC: NORMAL
HCG UR QL: 0.2 EU/DL
HGB UR QL STRIP.AUTO: NORMAL
KETONES UR-MCNC: NORMAL
LEUKOCYTE ESTERASE UR QL STRIP: NORMAL
NITRITE UR QL STRIP: NORMAL
PH UR STRIP: 7
PROT UR STRIP-MCNC: NORMAL
SP GR UR STRIP: 1.01

## 2024-05-03 PROCEDURE — 99213 OFFICE O/P EST LOW 20 MIN: CPT

## 2024-05-03 PROCEDURE — 81003 URINALYSIS AUTO W/O SCOPE: CPT | Mod: QW

## 2024-05-03 NOTE — REVIEW OF SYSTEMS
[Dysuria] : dysuria [Negative] : Heme/Lymph [Abdominal Pain] : no abdominal pain [Nausea] : no nausea [Vomiting] : no vomiting [Incontinence] : no incontinence [Nocturia] : no nocturia [Hematuria] : no hematuria [Frequency] : no frequency [Vaginal Discharge] : no vaginal discharge

## 2024-05-03 NOTE — HISTORY OF PRESENT ILLNESS
[FreeTextEntry8] : 64 y.o. F with PMHx of T2DM, HTN, HLD, and hypothyroidism presents with dysuria. Reports burning with micturition for the past 5 days ago Had a zpak at home which she started with mild relief.  Denies flank pain, fever, n/v, no hematuria, no abdominal pain.   Hx of recurrent UTIs in the past. Hx of renal stones.

## 2024-05-03 NOTE — PLAN
[FreeTextEntry1] : # Dysuria  - UA shows moderate leukocytes, negative nitrites, trace blood  - Urine C & S sent, will follow sensitivity  - Start Bactrim  -  on taking probiotics  to f/u for CPE

## 2024-05-06 LAB — BACTERIA UR CULT: ABNORMAL

## 2024-05-06 RX ORDER — AMOXICILLIN AND CLAVULANATE POTASSIUM 875; 125 MG/1; MG/1
875-125 TABLET, COATED ORAL
Qty: 14 | Refills: 1 | Status: DISCONTINUED | COMMUNITY
Start: 2021-10-15 | End: 2024-05-06

## 2024-05-06 RX ORDER — SULFAMETHOXAZOLE AND TRIMETHOPRIM 800; 160 MG/1; MG/1
800-160 TABLET ORAL
Qty: 14 | Refills: 0 | Status: DISCONTINUED | COMMUNITY
Start: 2024-05-03 | End: 2024-05-06

## 2024-05-08 DIAGNOSIS — N39.0 URINARY TRACT INFECTION, SITE NOT SPECIFIED: ICD-10-CM

## 2024-05-09 ENCOUNTER — APPOINTMENT (OUTPATIENT)
Dept: INTERNAL MEDICINE | Facility: CLINIC | Age: 64
End: 2024-05-09
Payer: MEDICARE

## 2024-05-09 VITALS
WEIGHT: 167 LBS | BODY MASS INDEX: 29.59 KG/M2 | SYSTOLIC BLOOD PRESSURE: 140 MMHG | HEART RATE: 81 BPM | DIASTOLIC BLOOD PRESSURE: 82 MMHG | OXYGEN SATURATION: 97 % | RESPIRATION RATE: 14 BRPM | HEIGHT: 63 IN | TEMPERATURE: 98.6 F

## 2024-05-09 DIAGNOSIS — I10 ESSENTIAL (PRIMARY) HYPERTENSION: ICD-10-CM

## 2024-05-09 DIAGNOSIS — Z00.00 ENCOUNTER FOR GENERAL ADULT MEDICAL EXAMINATION W/OUT ABNORMAL FINDINGS: ICD-10-CM

## 2024-05-09 DIAGNOSIS — F41.9 ANXIETY DISORDER, UNSPECIFIED: ICD-10-CM

## 2024-05-09 DIAGNOSIS — E78.1 PURE HYPERGLYCERIDEMIA: ICD-10-CM

## 2024-05-09 DIAGNOSIS — E11.9 TYPE 2 DIABETES MELLITUS W/OUT COMPLICATIONS: ICD-10-CM

## 2024-05-09 PROCEDURE — G0439: CPT

## 2024-05-09 RX ORDER — ESCITALOPRAM OXALATE 5 MG/1
5 TABLET ORAL
Qty: 60 | Refills: 2 | Status: DISCONTINUED | COMMUNITY
Start: 2023-07-14 | End: 2024-05-09

## 2024-05-09 RX ORDER — AZITHROMYCIN 250 MG/1
250 TABLET, FILM COATED ORAL
Qty: 1 | Refills: 0 | Status: DISCONTINUED | COMMUNITY
Start: 2024-01-19 | End: 2024-05-09

## 2024-05-09 RX ORDER — ESTRADIOL 0.1 MG/G
0.1 CREAM VAGINAL
Qty: 1 | Refills: 3 | Status: DISCONTINUED | COMMUNITY
Start: 2022-01-18 | End: 2024-05-09

## 2024-05-09 RX ORDER — ALPRAZOLAM 0.25 MG/1
0.25 TABLET ORAL
Qty: 30 | Refills: 0 | Status: DISCONTINUED | COMMUNITY
Start: 2023-06-15 | End: 2024-05-09

## 2024-05-09 RX ORDER — NITROFURANTOIN (MONOHYDRATE/MACROCRYSTALS) 25; 75 MG/1; MG/1
100 CAPSULE ORAL
Qty: 14 | Refills: 0 | Status: DISCONTINUED | COMMUNITY
Start: 2022-06-28 | End: 2024-05-09

## 2024-05-09 NOTE — ASSESSMENT
[FreeTextEntry1] : 63 y/o pmhx htn, hypertriglyceridemia, t2dm, asthma, hypothyroid presents for AWV.  #HCM - mammogram appt tomorrow - needs shingles vaccine, discussed with patient - has received pneumonia vaccine - needs colonoscopy, referral given - hasn't had bone density exam yet - script given - screening labs today  #UTI - finish cefuroxime  #HTN - still somewhat elevated today - patient will take 1.5 tabs of amlodipine qd for now - continue metoprolol - f/u at next visit  #t2dm - continue metformin  #hypothyroid - continue synthroid  #asthma - continue inhalers as prescribed  #hypertriglyceridemia - patient has been prescribed fenofibrate in the past however she is reluctant to take it - continue to monitor lipid profile

## 2024-05-10 PROBLEM — E78.1 HYPERTRIGLYCERIDEMIA: Status: ACTIVE | Noted: 2023-07-20

## 2024-05-10 PROBLEM — I10 BENIGN ESSENTIAL HTN: Status: ACTIVE | Noted: 2022-06-24

## 2024-05-10 PROBLEM — E11.9 DIABETES MELLITUS, TYPE 2: Status: ACTIVE | Noted: 2022-06-24

## 2024-05-10 PROBLEM — F41.9 ANXIETY: Status: ACTIVE | Noted: 2023-06-15

## 2024-05-10 NOTE — PHYSICAL EXAM
[Normal] : normal rate, regular rhythm, normal S1 and S2 and no murmur heard [No Varicosities] : no varicosities [No Edema] : there was no peripheral edema [Soft] : abdomen soft [Non Tender] : non-tender [Non-distended] : non-distended [No HSM] : no HSM [Normal Bowel Sounds] : normal bowel sounds [Normal Posterior Cervical Nodes] : no posterior cervical lymphadenopathy [Normal Anterior Cervical Nodes] : no anterior cervical lymphadenopathy [No Joint Swelling] : no joint swelling [No Rash] : no rash [Coordination Grossly Intact] : coordination grossly intact [No Focal Deficits] : no focal deficits [Normal Gait] : normal gait [Normal Affect] : the affect was normal [Normal Insight/Judgement] : insight and judgment were intact [Alert and Oriented x3] : oriented to person, place, and time

## 2024-05-10 NOTE — HISTORY OF PRESENT ILLNESS
[FreeTextEntry1] : IKE [de-identified] : 63 y/o pmhx htn, hypertrigliceridemia, t2dm, asthma, hypothyroid presents for AWV. She was recently seen for a UTI, culture positive ECOLI, med was changed to macrobid however patient complained of abd pain, switched to cefuroxime. Patient states her urinary complains are improved. She has been feeling better mentally and is not taking previously prescribed lexapro or xanax. She is not taking fenofibrate. She takes her inhalers as needed up to twice a week for asthma. No other concerns today.

## 2024-05-10 NOTE — HEALTH RISK ASSESSMENT
[No] : No [No falls in past year] : Patient reported no falls in the past year [2] : 1) Little interest or pleasure doing things for more than half of the days (2) [0] : 2) Feeling down, depressed, or hopeless: Not at all (0) [Fully functional (bathing, dressing, toileting, transferring, walking, feeding)] : Fully functional (bathing, dressing, toileting, transferring, walking, feeding) [Never] : Never [Good] : ~his/her~  mood as  good [1 or 2 (0 pts)] : 1 or 2 (0 points) [Never (0 pts)] : Never (0 points) [None] : None [With Significant Other] : lives with significant other [] :  [Fully functional (using the telephone, shopping, preparing meals, housekeeping, doing laundry, using] : Fully functional and needs no help or supervision to perform IADLs (using the telephone, shopping, preparing meals, housekeeping, doing laundry, using transportation, managing medications and managing finances) [MammogramComments] : going tomorrow for mammogram [BoneDensityComments] : never had one [ColonoscopyDate] : 4-5 years ago [Little interest or pleasure doing things] : 1) Little interest or pleasure doing things [Feeling down, depressed, or hopeless] : 2) Feeling down, depressed, or hopeless [de-identified] : Walking [de-identified] : Vegetarian [[id number]] : 2 [Change in mental status noted] : No change in mental status noted [Language] : denies difficulty with language [Behavior] : denies difficulty with behavior [Learning/Retaining New Information] : denies difficulty learning/retaining new information [Handling Complex Tasks] : denies difficulty handling complex tasks [Reasoning] : denies difficulty with reasoning [Spatial Ability and Orientation] : denies difficulty with spatial ability and orientation [Reports changes in hearing] : Reports no changes in hearing [Reports changes in vision] : Reports no changes in vision [Reports changes in dental health] : Reports no changes in dental health

## 2024-05-10 NOTE — PLAN
[FreeTextEntry1] : #HCM - check labs  - mammo script provided  - DEXA script provided  - referred to GI for colonoscopy  - has received pneumonia vaccine - reports she never had chicken pox so does not want shingles vaccine   #UTI - on cefuroxime based on C&S, symptoms improving   #HTN - still somewhat elevated today and reports similar readings at home  - patient will take 1.5 tabs of amlodipine qd for now - continue metoprolol - f/u at next visit  #t2dm - continue metformin - check a1c - diabetic eye exam   #hypothyroid - continue synthroid  #asthma - continue inhalers as prescribed  #hypertriglyceridemia - patient has been prescribed fenofibrate in the past however she is reluctant to take it - continue to monitor lipid profile  #anxiety - stable off of meds

## 2024-05-13 LAB
25(OH)D3 SERPL-MCNC: 19.8 NG/ML
ALBUMIN SERPL ELPH-MCNC: 4.9 G/DL
ALP BLD-CCNC: 79 U/L
ALT SERPL-CCNC: 18 U/L
ANION GAP SERPL CALC-SCNC: 14 MMOL/L
AST SERPL-CCNC: 21 U/L
BASOPHILS # BLD AUTO: 0.02 K/UL
BASOPHILS NFR BLD AUTO: 0.3 %
BILIRUB SERPL-MCNC: 0.4 MG/DL
BUN SERPL-MCNC: 9 MG/DL
CALCIUM SERPL-MCNC: 9.8 MG/DL
CHLORIDE SERPL-SCNC: 99 MMOL/L
CHOLEST SERPL-MCNC: 244 MG/DL
CO2 SERPL-SCNC: 24 MMOL/L
CREAT SERPL-MCNC: 0.62 MG/DL
EGFR: 98 ML/MIN/1.73M2
EOSINOPHIL # BLD AUTO: 0.36 K/UL
EOSINOPHIL NFR BLD AUTO: 5.7 %
ESTIMATED AVERAGE GLUCOSE: 169 MG/DL
FOLATE SERPL-MCNC: 13.1 NG/ML
GLUCOSE SERPL-MCNC: 108 MG/DL
HBA1C MFR BLD HPLC: 7.5 %
HCT VFR BLD CALC: 41.8 %
HDLC SERPL-MCNC: 41 MG/DL
HGB BLD-MCNC: 13.3 G/DL
IMM GRANULOCYTES NFR BLD AUTO: 0.2 %
LDLC SERPL CALC-MCNC: 126 MG/DL
LYMPHOCYTES # BLD AUTO: 1.94 K/UL
LYMPHOCYTES NFR BLD AUTO: 30.8 %
MAN DIFF?: NORMAL
MCHC RBC-ENTMCNC: 27.9 PG
MCHC RBC-ENTMCNC: 31.8 GM/DL
MCV RBC AUTO: 87.8 FL
MONOCYTES # BLD AUTO: 0.49 K/UL
MONOCYTES NFR BLD AUTO: 7.8 %
NEUTROPHILS # BLD AUTO: 3.48 K/UL
NEUTROPHILS NFR BLD AUTO: 55.2 %
NONHDLC SERPL-MCNC: 203 MG/DL
PLATELET # BLD AUTO: 344 K/UL
POTASSIUM SERPL-SCNC: 4.1 MMOL/L
PROT SERPL-MCNC: 8 G/DL
RBC # BLD: 4.76 M/UL
RBC # FLD: 13.7 %
SODIUM SERPL-SCNC: 137 MMOL/L
TRIGL SERPL-MCNC: 434 MG/DL
TSH SERPL-ACNC: 2.59 UIU/ML
VIT B12 SERPL-MCNC: 274 PG/ML
WBC # FLD AUTO: 6.3 K/UL

## 2024-05-20 RX ORDER — CEFUROXIME AXETIL 250 MG/1
250 TABLET ORAL
Qty: 14 | Refills: 0 | Status: COMPLETED | COMMUNITY
Start: 2024-05-08 | End: 2024-05-20

## 2024-05-20 RX ORDER — CEFUROXIME AXETIL 500 MG/1
500 TABLET ORAL
Qty: 14 | Refills: 0 | Status: ACTIVE | COMMUNITY
Start: 2023-02-27 | End: 1900-01-01

## 2024-06-06 ENCOUNTER — RX RENEWAL (OUTPATIENT)
Age: 64
End: 2024-06-06

## 2024-06-12 ENCOUNTER — RX RENEWAL (OUTPATIENT)
Age: 64
End: 2024-06-12

## 2024-06-12 RX ORDER — ALBUTEROL SULFATE 90 UG/1
108 (90 BASE) INHALANT RESPIRATORY (INHALATION)
Qty: 1 | Refills: 1 | Status: ACTIVE | COMMUNITY
Start: 2022-10-25 | End: 1900-01-01

## 2024-06-18 ENCOUNTER — RX RENEWAL (OUTPATIENT)
Age: 64
End: 2024-06-18

## 2024-06-18 RX ORDER — METFORMIN ER 500 MG 500 MG/1
500 TABLET ORAL
Qty: 90 | Refills: 1 | Status: ACTIVE | COMMUNITY
Start: 1900-01-01 | End: 1900-01-01

## 2024-07-09 ENCOUNTER — RX RENEWAL (OUTPATIENT)
Age: 64
End: 2024-07-09

## 2024-07-16 NOTE — CONSULT NOTE ADULT - CONSULT REQUESTED DATE/TIME
15-Jul-2023 13:50 Render In Strict Bullet Format?: No Detail Level: Zone Initiate Treatment: Triamcinolone cream twice daily for up to two weeks out of the month\\nTacrolimus twice daily \\nFexofenadine 180 mg one to two times daily as needed for itching

## 2024-07-26 ENCOUNTER — APPOINTMENT (OUTPATIENT)
Dept: INTERNAL MEDICINE | Facility: CLINIC | Age: 64
End: 2024-07-26

## 2024-09-09 ENCOUNTER — RX RENEWAL (OUTPATIENT)
Age: 64
End: 2024-09-09

## 2024-09-25 ENCOUNTER — APPOINTMENT (OUTPATIENT)
Dept: INTERNAL MEDICINE | Facility: CLINIC | Age: 64
End: 2024-09-25
Payer: MEDICARE

## 2024-09-25 VITALS
HEIGHT: 64 IN | DIASTOLIC BLOOD PRESSURE: 82 MMHG | OXYGEN SATURATION: 96 % | TEMPERATURE: 98 F | RESPIRATION RATE: 14 BRPM | BODY MASS INDEX: 28.17 KG/M2 | WEIGHT: 165 LBS | HEART RATE: 65 BPM | SYSTOLIC BLOOD PRESSURE: 112 MMHG

## 2024-09-25 DIAGNOSIS — B00.1 HERPESVIRAL VESICULAR DERMATITIS: ICD-10-CM

## 2024-09-25 DIAGNOSIS — J45.909 UNSPECIFIED ASTHMA, UNCOMPLICATED: ICD-10-CM

## 2024-09-25 PROCEDURE — 99214 OFFICE O/P EST MOD 30 MIN: CPT

## 2024-09-25 RX ORDER — METHYLPREDNISOLONE 4 MG/1
4 TABLET ORAL
Qty: 1 | Refills: 0 | Status: ACTIVE | COMMUNITY
Start: 2024-09-25 | End: 1900-01-01

## 2024-09-25 RX ORDER — MONTELUKAST 10 MG/1
10 TABLET, FILM COATED ORAL
Qty: 90 | Refills: 0 | Status: ACTIVE | COMMUNITY
Start: 2024-09-25 | End: 1900-01-01

## 2024-09-25 RX ORDER — ELECTROLYTES/DEXTROSE
29G X 12MM SOLUTION, ORAL ORAL
Qty: 90 | Refills: 0 | Status: ACTIVE | COMMUNITY
Start: 2024-09-25 | End: 1900-01-01

## 2024-09-25 RX ORDER — VALACYCLOVIR 1 G/1
1 TABLET, FILM COATED ORAL
Qty: 30 | Refills: 0 | Status: ACTIVE | COMMUNITY
Start: 2024-09-25 | End: 1900-01-01

## 2024-09-25 NOTE — HISTORY OF PRESENT ILLNESS
[Spouse] : spouse [de-identified] : 64 yr old female here for follow up. She has asthma, diabetes mellitus and hypertension. She recently had a flare up of asthma and went to an urgent care, where she was prescribed Medrol dose pack and Azithromycin.  She feels better today. Cold sore over lip for past 2 days.

## 2024-09-25 NOTE — PHYSICAL EXAM
[No Acute Distress] : no acute distress [Well Nourished] : well nourished [Well Developed] : well developed [Normal Sclera/Conjunctiva] : normal sclera/conjunctiva [EOMI] : extraocular movements intact [Normal Outer Ear/Nose] : the outer ears and nose were normal in appearance [Normal Oropharynx] : the oropharynx was normal [No JVD] : no jugular venous distention [No Lymphadenopathy] : no lymphadenopathy [Supple] : supple [No Respiratory Distress] : no respiratory distress  [No Accessory Muscle Use] : no accessory muscle use [Normal Rate] : normal rate  [Regular Rhythm] : with a regular rhythm [Normal S1, S2] : normal S1 and S2 [No Edema] : there was no peripheral edema [Soft] : abdomen soft [Non Tender] : non-tender [Non-distended] : non-distended [No HSM] : no HSM [Normal Bowel Sounds] : normal bowel sounds [Normal Posterior Cervical Nodes] : no posterior cervical lymphadenopathy [Normal Anterior Cervical Nodes] : no anterior cervical lymphadenopathy [No CVA Tenderness] : no CVA  tenderness [No Spinal Tenderness] : no spinal tenderness [No Joint Swelling] : no joint swelling [Grossly Normal Strength/Tone] : grossly normal strength/tone [No Rash] : no rash [No Focal Deficits] : no focal deficits [Normal Gait] : normal gait [Normal Affect] : the affect was normal [Normal Insight/Judgement] : insight and judgment were intact [de-identified] : minimal wheezing

## 2024-09-25 NOTE — PLAN
[FreeTextEntry1] : Medications reviewed and reconciled. Flu vaccine today Complete Medrol dose pack and antibiotics as prescribed. Check labs. Further recommendations pending labs.

## 2024-09-26 LAB
25(OH)D3 SERPL-MCNC: 20.1 NG/ML
ALBUMIN SERPL ELPH-MCNC: 4.9 G/DL
ALP BLD-CCNC: 85 U/L
ALT SERPL-CCNC: 20 U/L
ANION GAP SERPL CALC-SCNC: 18 MMOL/L
AST SERPL-CCNC: 17 U/L
BASOPHILS # BLD AUTO: 0.05 K/UL
BASOPHILS NFR BLD AUTO: 0.4 %
BILIRUB SERPL-MCNC: 0.4 MG/DL
BUN SERPL-MCNC: 13 MG/DL
CALCIUM SERPL-MCNC: 10.5 MG/DL
CHLORIDE SERPL-SCNC: 97 MMOL/L
CHOLEST SERPL-MCNC: 195 MG/DL
CO2 SERPL-SCNC: 25 MMOL/L
CREAT SERPL-MCNC: 0.64 MG/DL
EGFR: 97 ML/MIN/1.73M2
EOSINOPHIL # BLD AUTO: 0.18 K/UL
EOSINOPHIL NFR BLD AUTO: 1.5 %
ESTIMATED AVERAGE GLUCOSE: 166 MG/DL
GLUCOSE SERPL-MCNC: 107 MG/DL
HBA1C MFR BLD HPLC: 7.4 %
HCT VFR BLD CALC: 42.8 %
HDLC SERPL-MCNC: 49 MG/DL
HGB BLD-MCNC: 13.7 G/DL
IMM GRANULOCYTES NFR BLD AUTO: 0.7 %
LDLC SERPL CALC-MCNC: 106 MG/DL
LYMPHOCYTES # BLD AUTO: 3.17 K/UL
LYMPHOCYTES NFR BLD AUTO: 25.8 %
MAN DIFF?: NORMAL
MCHC RBC-ENTMCNC: 28.2 PG
MCHC RBC-ENTMCNC: 32 GM/DL
MCV RBC AUTO: 88.1 FL
MONOCYTES # BLD AUTO: 1.04 K/UL
MONOCYTES NFR BLD AUTO: 8.5 %
NEUTROPHILS # BLD AUTO: 7.75 K/UL
NEUTROPHILS NFR BLD AUTO: 63.1 %
NONHDLC SERPL-MCNC: 146 MG/DL
PLATELET # BLD AUTO: 433 K/UL
POTASSIUM SERPL-SCNC: 4.3 MMOL/L
PROT SERPL-MCNC: 8.3 G/DL
RBC # BLD: 4.86 M/UL
RBC # FLD: 14.3 %
SODIUM SERPL-SCNC: 140 MMOL/L
TRIGL SERPL-MCNC: 235 MG/DL
VIT B12 SERPL-MCNC: 284 PG/ML
WBC # FLD AUTO: 12.28 K/UL

## 2024-09-26 RX ORDER — ERGOCALCIFEROL 1.25 MG/1
50000 CAPSULE ORAL
Qty: 8 | Refills: 0 | Status: ACTIVE | COMMUNITY
Start: 2024-09-26 | End: 1900-01-01

## 2024-10-30 ENCOUNTER — APPOINTMENT (OUTPATIENT)
Dept: INTERNAL MEDICINE | Facility: CLINIC | Age: 64
End: 2024-10-30
Payer: MEDICARE

## 2024-10-30 DIAGNOSIS — N39.0 URINARY TRACT INFECTION, SITE NOT SPECIFIED: ICD-10-CM

## 2024-10-30 LAB
BILIRUB UR QL STRIP: NEGATIVE
CLARITY UR: CLEAR
COLLECTION METHOD: NORMAL
GLUCOSE UR-MCNC: NEGATIVE
HCG UR QL: 0.2 EU/DL
HGB UR QL STRIP.AUTO: NORMAL
KETONES UR-MCNC: NEGATIVE
LEUKOCYTE ESTERASE UR QL STRIP: NORMAL
NITRITE UR QL STRIP: NEGATIVE
PH UR STRIP: 6
PROT UR STRIP-MCNC: NEGATIVE
SP GR UR STRIP: 1.01

## 2024-10-30 PROCEDURE — 90662 IIV NO PRSV INCREASED AG IM: CPT

## 2024-10-30 PROCEDURE — 81003 URINALYSIS AUTO W/O SCOPE: CPT | Mod: QW

## 2024-10-30 PROCEDURE — G0008: CPT

## 2024-10-30 PROCEDURE — 99213 OFFICE O/P EST LOW 20 MIN: CPT | Mod: 25

## 2024-10-30 RX ORDER — ATORVASTATIN CALCIUM 10 MG/1
10 TABLET, FILM COATED ORAL
Qty: 90 | Refills: 0 | Status: ACTIVE | COMMUNITY
Start: 2024-10-30 | End: 1900-01-01

## 2024-10-30 RX ORDER — LANCETS 30 GAUGE
EACH MISCELLANEOUS
Qty: 90 | Refills: 0 | Status: ACTIVE | COMMUNITY
Start: 2024-10-30 | End: 1900-01-01

## 2024-10-30 RX ORDER — SULFAMETHOXAZOLE AND TRIMETHOPRIM 800; 160 MG/1; MG/1
800-160 TABLET ORAL TWICE DAILY
Qty: 6 | Refills: 0 | Status: ACTIVE | COMMUNITY
Start: 2024-10-30 | End: 1900-01-01

## 2024-11-04 LAB — BACTERIA UR CULT: ABNORMAL

## 2024-11-04 RX ORDER — CEFPODOXIME PROXETIL 200 MG/1
200 TABLET, FILM COATED ORAL
Qty: 10 | Refills: 0 | Status: ACTIVE | COMMUNITY
Start: 2024-11-04 | End: 1900-01-01

## 2024-12-02 ENCOUNTER — APPOINTMENT (OUTPATIENT)
Dept: INTERNAL MEDICINE | Facility: CLINIC | Age: 64
End: 2024-12-02
Payer: MEDICARE

## 2024-12-02 VITALS
TEMPERATURE: 98.8 F | SYSTOLIC BLOOD PRESSURE: 118 MMHG | HEART RATE: 75 BPM | BODY MASS INDEX: 28.17 KG/M2 | RESPIRATION RATE: 14 BRPM | OXYGEN SATURATION: 96 % | DIASTOLIC BLOOD PRESSURE: 80 MMHG | WEIGHT: 165 LBS | HEIGHT: 64 IN

## 2024-12-02 DIAGNOSIS — I10 ESSENTIAL (PRIMARY) HYPERTENSION: ICD-10-CM

## 2024-12-02 PROCEDURE — 99213 OFFICE O/P EST LOW 20 MIN: CPT

## 2024-12-03 LAB
ALBUMIN SERPL ELPH-MCNC: 4.7 G/DL
ALP BLD-CCNC: 80 U/L
ALT SERPL-CCNC: 15 U/L
ANION GAP SERPL CALC-SCNC: 16 MMOL/L
AST SERPL-CCNC: 16 U/L
BILIRUB SERPL-MCNC: 0.5 MG/DL
BUN SERPL-MCNC: 9 MG/DL
CALCIUM SERPL-MCNC: 10.1 MG/DL
CHLORIDE SERPL-SCNC: 98 MMOL/L
CO2 SERPL-SCNC: 24 MMOL/L
CREAT SERPL-MCNC: 0.54 MG/DL
EGFR: 101 ML/MIN/1.73M2
ESTIMATED AVERAGE GLUCOSE: 160 MG/DL
GLUCOSE SERPL-MCNC: 122 MG/DL
HBA1C MFR BLD HPLC: 7.2 %
POTASSIUM SERPL-SCNC: 4.3 MMOL/L
PROT SERPL-MCNC: 7.8 G/DL
SODIUM SERPL-SCNC: 139 MMOL/L

## 2024-12-23 ENCOUNTER — RX RENEWAL (OUTPATIENT)
Age: 64
End: 2024-12-23

## 2024-12-24 ENCOUNTER — RX RENEWAL (OUTPATIENT)
Age: 64
End: 2024-12-24

## 2024-12-29 ENCOUNTER — EMERGENCY (EMERGENCY)
Facility: HOSPITAL | Age: 64
LOS: 1 days | Discharge: HOME | End: 2024-12-29
Attending: EMERGENCY MEDICINE | Admitting: EMERGENCY MEDICINE
Payer: MEDICARE

## 2024-12-29 VITALS
HEIGHT: 62 IN | WEIGHT: 160.06 LBS | HEART RATE: 82 BPM | OXYGEN SATURATION: 99 % | DIASTOLIC BLOOD PRESSURE: 88 MMHG | SYSTOLIC BLOOD PRESSURE: 147 MMHG | RESPIRATION RATE: 15 BRPM | TEMPERATURE: 99 F

## 2024-12-29 VITALS
TEMPERATURE: 98 F | DIASTOLIC BLOOD PRESSURE: 75 MMHG | SYSTOLIC BLOOD PRESSURE: 131 MMHG | RESPIRATION RATE: 15 BRPM | OXYGEN SATURATION: 98 % | HEART RATE: 70 BPM

## 2024-12-29 DIAGNOSIS — Z98.89 OTHER SPECIFIED POSTPROCEDURAL STATES: Chronic | ICD-10-CM

## 2024-12-29 DIAGNOSIS — Z98.51 TUBAL LIGATION STATUS: Chronic | ICD-10-CM

## 2024-12-29 LAB
ANION GAP SERPL CALC-SCNC: 9 MMOL/L — SIGNIFICANT CHANGE UP (ref 5–17)
BASOPHILS # BLD AUTO: 0.04 K/UL — SIGNIFICANT CHANGE UP (ref 0–0.2)
BASOPHILS NFR BLD AUTO: 0.4 % — SIGNIFICANT CHANGE UP (ref 0–2)
BUN SERPL-MCNC: 7 MG/DL — SIGNIFICANT CHANGE UP (ref 7–23)
CALCIUM SERPL-MCNC: 9.6 MG/DL — SIGNIFICANT CHANGE UP (ref 8.4–10.5)
CHLORIDE SERPL-SCNC: 106 MMOL/L — SIGNIFICANT CHANGE UP (ref 96–108)
CO2 SERPL-SCNC: 28 MMOL/L — SIGNIFICANT CHANGE UP (ref 22–31)
CREAT SERPL-MCNC: 0.65 MG/DL — SIGNIFICANT CHANGE UP (ref 0.5–1.3)
EGFR: 97 ML/MIN/1.73M2 — SIGNIFICANT CHANGE UP
EOSINOPHIL # BLD AUTO: 0.7 K/UL — HIGH (ref 0–0.5)
EOSINOPHIL NFR BLD AUTO: 7.8 % — HIGH (ref 0–6)
FLUAV AG NPH QL: SIGNIFICANT CHANGE UP
FLUBV AG NPH QL: SIGNIFICANT CHANGE UP
GLUCOSE SERPL-MCNC: 131 MG/DL — HIGH (ref 70–99)
HCT VFR BLD CALC: 41.1 % — SIGNIFICANT CHANGE UP (ref 34.5–45)
HGB BLD-MCNC: 13.5 G/DL — SIGNIFICANT CHANGE UP (ref 11.5–15.5)
IMM GRANULOCYTES NFR BLD AUTO: 0.6 % — SIGNIFICANT CHANGE UP (ref 0–0.9)
LYMPHOCYTES # BLD AUTO: 2 K/UL — SIGNIFICANT CHANGE UP (ref 1–3.3)
LYMPHOCYTES # BLD AUTO: 22.1 % — SIGNIFICANT CHANGE UP (ref 13–44)
MCHC RBC-ENTMCNC: 28.1 PG — SIGNIFICANT CHANGE UP (ref 27–34)
MCHC RBC-ENTMCNC: 32.8 G/DL — SIGNIFICANT CHANGE UP (ref 32–36)
MCV RBC AUTO: 85.4 FL — SIGNIFICANT CHANGE UP (ref 80–100)
MONOCYTES # BLD AUTO: 0.62 K/UL — SIGNIFICANT CHANGE UP (ref 0–0.9)
MONOCYTES NFR BLD AUTO: 6.9 % — SIGNIFICANT CHANGE UP (ref 2–14)
NEUTROPHILS # BLD AUTO: 5.62 K/UL — SIGNIFICANT CHANGE UP (ref 1.8–7.4)
NEUTROPHILS NFR BLD AUTO: 62.2 % — SIGNIFICANT CHANGE UP (ref 43–77)
NRBC # BLD: 0 /100 WBCS — SIGNIFICANT CHANGE UP (ref 0–0)
PLATELET # BLD AUTO: 307 K/UL — SIGNIFICANT CHANGE UP (ref 150–400)
POTASSIUM SERPL-MCNC: 3.7 MMOL/L — SIGNIFICANT CHANGE UP (ref 3.5–5.3)
POTASSIUM SERPL-SCNC: 3.7 MMOL/L — SIGNIFICANT CHANGE UP (ref 3.5–5.3)
RBC # BLD: 4.81 M/UL — SIGNIFICANT CHANGE UP (ref 3.8–5.2)
RBC # FLD: 13.9 % — SIGNIFICANT CHANGE UP (ref 10.3–14.5)
RSV RNA NPH QL NAA+NON-PROBE: SIGNIFICANT CHANGE UP
SARS-COV-2 RNA SPEC QL NAA+PROBE: SIGNIFICANT CHANGE UP
SODIUM SERPL-SCNC: 143 MMOL/L — SIGNIFICANT CHANGE UP (ref 135–145)
WBC # BLD: 9.03 K/UL — SIGNIFICANT CHANGE UP (ref 3.8–10.5)
WBC # FLD AUTO: 9.03 K/UL — SIGNIFICANT CHANGE UP (ref 3.8–10.5)

## 2024-12-29 PROCEDURE — 71045 X-RAY EXAM CHEST 1 VIEW: CPT

## 2024-12-29 PROCEDURE — 80048 BASIC METABOLIC PNL TOTAL CA: CPT

## 2024-12-29 PROCEDURE — 85025 COMPLETE CBC W/AUTO DIFF WBC: CPT

## 2024-12-29 PROCEDURE — 93010 ELECTROCARDIOGRAM REPORT: CPT

## 2024-12-29 PROCEDURE — 71045 X-RAY EXAM CHEST 1 VIEW: CPT | Mod: 26

## 2024-12-29 PROCEDURE — 87637 SARSCOV2&INF A&B&RSV AMP PRB: CPT

## 2024-12-29 PROCEDURE — 93005 ELECTROCARDIOGRAM TRACING: CPT

## 2024-12-29 PROCEDURE — 99285 EMERGENCY DEPT VISIT HI MDM: CPT | Mod: 25

## 2024-12-29 PROCEDURE — 99284 EMERGENCY DEPT VISIT MOD MDM: CPT

## 2024-12-29 PROCEDURE — 36415 COLL VENOUS BLD VENIPUNCTURE: CPT

## 2024-12-29 RX ORDER — AZITHROMYCIN 250 MG/1
1 TABLET, FILM COATED ORAL
Qty: 3 | Refills: 0
Start: 2024-12-29 | End: 2024-12-31

## 2024-12-29 NOTE — ED PROVIDER NOTE - NSFOLLOWUPINSTRUCTIONS_ED_ALL_ED_FT
Return to the ED for any new or worsening symptoms  Take your medication as prescribed  For congestion consider over-the-counter Coricidin HBP  Zithromax per label instructions  You can continue the nebulizer as needed  Make sure to take your blood pressure medication  Maintain hydration  Advance activity as tolerated  Follow up with your PMD in 1-2 days for a recheck     Acute Bronchitis    WHAT YOU NEED TO KNOW:    Acute bronchitis is swelling and irritation in your lungs. It is usually caused by a virus and most often happens in the winter. Bronchitis may also be caused by bacteria or by a chemical irritant, such as smoke.    DISCHARGE INSTRUCTIONS:    Return to the emergency department if:    You cough up blood.    Your lips or fingernails turn blue.    You feel like you are not getting enough air when you breathe.  Call your doctor if:    Your symptoms do not go away or get worse, even after treatment.    Your cough does not get better within 4 weeks.    You have questions or concerns about your condition or care.  Medicines: You may need any of the following:    Cough suppressants decrease your urge to cough.    Decongestants help loosen mucus in your lungs and make it easier to cough up. This can help you breathe easier.    Inhalers may be given. Your healthcare provider may give you one or more inhalers to help you breathe easier and cough less. An inhaler gives you medicine to open your airways. Ask your healthcare provider to show you how to use your inhaler correctly.  Metered Dose Inhaler      Antiviral medicine treats infections caused by a virus.    Antibiotics may be given if your bronchitis is caused by bacteria or if you have lung condition.    Acetaminophen decreases pain and fever. It is available without a doctor's order. Ask how much to take and how often to take it. Follow directions. Read the labels of all other medicines you are using to see if they also contain acetaminophen, or ask your doctor or pharmacist. Acetaminophen can cause liver damage if not taken correctly.    NSAIDs help decrease swelling and pain or fever. This medicine is available with or without a doctor's order. NSAIDs can cause stomach bleeding or kidney problems in certain people. If you take blood thinner medicine, always ask your healthcare provider if NSAIDs are safe for you. Always read the medicine label and follow directions.  Self-care:    Drink liquids as directed. You may need to drink more liquids than usual to stay hydrated. Ask how much liquid to drink each day and which liquids are best for you.    Use a cool mist humidifier. This increases air moisture in your home. This may make it easier for you to breathe and help decrease your cough.  Humidifier      Get more rest. Rest helps your body to heal. Slowly start to do more each day. Rest when you feel it is needed.  Prevent acute bronchitis:      Ask about vaccines you may need. Get a flu vaccine each year as soon as recommended, usually in September or October. Ask your healthcare provider if you should also get a pneumonia or COVID-19 vaccine. Your healthcare provider can tell you if you should also get other vaccines, and when to get them.    Prevent the spread of germs. You can decrease your risk for acute bronchitis and other illnesses by doing the following:  Wash your hands often with soap and water. Carry germ-killing hand lotion or gel with you. You can use the lotion or gel to clean your hands when soap and water are not available.  Handwashing      Do not touch your eyes, nose, or mouth unless you have washed your hands first.    Always cover your mouth when you cough to prevent the spread of germs. It is best to cough into a tissue or your shirt sleeve instead of into your hand. Ask those around you to cover their mouths when they cough.    Try to avoid people who have a cold or the flu. If you are sick, stay away from others as much as possible.    Avoid irritants in the air. Avoid chemicals, fumes, and dust. Wear a face mask if you must work around dust or fumes. Stay inside on days when air pollution levels are high. If you have allergies, stay inside when pollen counts are high. Do not use aerosol products, such as spray-on deodorant, bug spray, and hair spray.    Do not smoke or be around others who are smoking. Nicotine and other chemicals in cigarettes and cigars can cause lung damage. Ask your healthcare provider for information if you currently smoke and need help to quit. E-cigarettes or smokeless tobacco still contain nicotine. Talk to your healthcare provider before you use these products.  Follow up with your doctor as directed: Write down questions you have so you will remember to ask them during your follow-up visits.

## 2024-12-29 NOTE — ED PROVIDER NOTE - CPE EDP EYES NORM
"Assessment/Plan:     Problem List Items Addressed This Visit    None  Visit Diagnoses         Viral illness    -  Primary    I think she probably has a virus.  Drink lots of fluids, rest when you can.  It should get better on its own.  Call with worsening or new symptoms.      Sore throat        Rapid strep test in the office was negative.  We will send a culture and we will call you if that is positive.    Relevant Orders    POCT rapid ANTIGEN strepA (Completed)    Throat culture      History of fever        Give ibuprofen for fever, sore throat, headache, and call us if any of those things do not go away.          **Rapid strep negative, culture sent.    Subjective:    HPI:  - 10yo female here with mom for sick visit.    Per mom, symptoms started yesterday - mom picked her up from school early.  Sore throat.  Headache.   Not feeling well.   Drinking well.  Not eating - due to sore throat, maybe (patient is difficult to get hx from, mom is fair historian).  Some cough.    Mom gave ibuprofen today due to fever (100).   No v/d.         Objective:    Vital signs - BP (!) 110/50 (BP Location: Right arm, Patient Position: Sitting)   Pulse 110   Temp 98.2 °F (36.8 °C) (Tympanic)   Ht 4' 10.07\" (1.475 m)   Wt 56.7 kg (125 lb)   SpO2 98%   BMI 26.06 kg/m²       Physical Exam -   General - Awake, alert, no apparent distress.  Well-hydrated.  HENT - Normocephalic.  Mucous membranes are moist.  Posterior oropharynx is erythematous, no lesions, no exudate.  No palatal petechiae.  Bilateral tympanic membranes obscured by cerumen.  Eyes - Clear, no drainage.  Neck - FROM without limitation.  Mild anterior cervical lymphadenopathy, nontender to palpation.  Cardiovascular - Well-perfused.  Regular rate and rhythm, no murmur noted.  Brisk capillary refill.  Respiratory - No tachypnea, no increased work of breathing.  Lungs are clear to auscultation bilaterally.  Musculoskeletal - Warm and well perfused.  Moves all extremities " well.  Skin - No rashes noted.  Neuro - Grossly normal neuro exam; no focal deficits noted.    Review of Systems - As above/below, otherwise, negative and normal.    **All items in AVS were discussed with family / caregivers, unless otherwise noted.            normal...

## 2024-12-29 NOTE — ED PROVIDER NOTE - DIFFERENTIAL DIAGNOSIS
Patient presenting to the emergency room with an episode of hypertension now resolved asymptomatic also with URI symptoms times several days.  Will obtain screening labs viral swab chest x-ray EKG.  Will monitor blood pressure.  Ultimate clinical disposition will be pending results. Differential Diagnosis

## 2024-12-29 NOTE — ED PROVIDER NOTE - CLINICAL SUMMARY MEDICAL DECISION MAKING FREE TEXT BOX
Independent review of EKG reveals a normal sinus rhythm at 68 bpm Independent review of EKG reveals a normal sinus rhythm at 68 bpm. Notes of labs reviewed patient with a normal white count no evidence of anemia CMP noted flu COVID-negative.  Independent review of chest x-ray reveals no focal infiltrate lungs are clear at this time patient asymptomatic will discharge Patient is a 64-year-old female presents to the emergency room with an episode of hypertension.  Past medical history of asthma diabetes GERD HSV hyperlipidemia hypertension thyroid disease.  Patient has been having URI symptoms for the last several days she has been taking her Singulair and using her nebulizer.  This morning she woke and took her blood pressure and her systolic pressure was over 200.  She was asymptomatic at that time.  Took her blood pressure medication and called EMS.  On EMS arrival her blood pressure had normalized.  She denies any headache visual changes nausea vomiting chest pain shortness of breath at this time or abdominal pain.  No acute complaints at this time. Patient presenting to the emergency room with an episode of hypertension now resolved asymptomatic also with URI symptoms times several days.  Will obtain screening labs viral swab chest x-ray EKG.  Will monitor blood pressure.  Ultimate clinical disposition will be pending results. Independent review of EKG reveals a normal sinus rhythm at 68 bpm. Notes of labs reviewed patient with a normal white count no evidence of anemia CMP noted flu COVID-negative.  Independent review of chest x-ray reveals no focal infiltrate lungs are clear at this time patient asymptomatic will discharge ambulatory without issue, BP improving

## 2024-12-29 NOTE — ED PROVIDER NOTE - OBJECTIVE STATEMENT
Patient is a 64-year-old female presents to the emergency room with an episode of hypertension.  Past medical history of asthma diabetes GERD HSV hyperlipidemia hypertension thyroid disease.  Patient has been having URI symptoms for the last several days she has been taking her Singulair and using her nebulizer.  This morning she woke and took her blood pressure and her systolic pressure was over 200.  She was asymptomatic at that time.  Took her blood pressure medication and called EMS.  On EMS arrival her blood pressure had normalized.  She denies any headache visual changes nausea vomiting chest pain shortness of breath at this time or abdominal pain.  No acute complaints at this time.

## 2024-12-29 NOTE — ED PROVIDER NOTE - PATIENT PORTAL LINK FT
You can access the FollowMyHealth Patient Portal offered by [facility] by registering at the following website: [email or web address]. By joining [facility]’s FollowMyHealth portal, you will also be able to view your health information using other applications (apps) compatible with our system.

## 2024-12-29 NOTE — ED ADULT TRIAGE NOTE - CHIEF COMPLAINT QUOTE
he blood pressure felt high, and took her meds. Now he blood pressure is better and she feels good. as per ems

## 2025-01-27 ENCOUNTER — RX RENEWAL (OUTPATIENT)
Age: 65
End: 2025-01-27

## 2025-01-30 ENCOUNTER — APPOINTMENT (OUTPATIENT)
Dept: INTERNAL MEDICINE | Facility: CLINIC | Age: 65
End: 2025-01-30
Payer: MEDICARE

## 2025-01-30 VITALS
HEART RATE: 76 BPM | WEIGHT: 165 LBS | RESPIRATION RATE: 15 BRPM | DIASTOLIC BLOOD PRESSURE: 70 MMHG | TEMPERATURE: 98 F | HEIGHT: 64 IN | SYSTOLIC BLOOD PRESSURE: 138 MMHG | OXYGEN SATURATION: 99 % | BODY MASS INDEX: 28.17 KG/M2

## 2025-01-30 DIAGNOSIS — I10 ESSENTIAL (PRIMARY) HYPERTENSION: ICD-10-CM

## 2025-01-30 DIAGNOSIS — E53.8 DEFICIENCY OF OTHER SPECIFIED B GROUP VITAMINS: ICD-10-CM

## 2025-01-30 DIAGNOSIS — H91.93 UNSPECIFIED HEARING LOSS, BILATERAL: ICD-10-CM

## 2025-01-30 DIAGNOSIS — E55.9 VITAMIN D DEFICIENCY, UNSPECIFIED: ICD-10-CM

## 2025-01-30 DIAGNOSIS — E11.9 TYPE 2 DIABETES MELLITUS W/OUT COMPLICATIONS: ICD-10-CM

## 2025-01-30 PROCEDURE — 99213 OFFICE O/P EST LOW 20 MIN: CPT

## 2025-01-30 RX ORDER — AMLODIPINE BESYLATE 10 MG/1
10 TABLET ORAL
Qty: 90 | Refills: 0 | Status: ACTIVE | COMMUNITY
Start: 2025-01-30 | End: 1900-01-01

## 2025-01-31 LAB
25(OH)D3 SERPL-MCNC: 22.5 NG/ML
ALBUMIN SERPL ELPH-MCNC: 4.8 G/DL
ALP BLD-CCNC: 89 U/L
ALT SERPL-CCNC: 14 U/L
ANION GAP SERPL CALC-SCNC: 13 MMOL/L
AST SERPL-CCNC: 18 U/L
BILIRUB SERPL-MCNC: 0.4 MG/DL
BUN SERPL-MCNC: 8 MG/DL
CALCIUM SERPL-MCNC: 10.3 MG/DL
CHLORIDE SERPL-SCNC: 102 MMOL/L
CO2 SERPL-SCNC: 26 MMOL/L
CREAT SERPL-MCNC: 0.59 MG/DL
EGFR: 99 ML/MIN/1.73M2
ESTIMATED AVERAGE GLUCOSE: 151 MG/DL
GLUCOSE SERPL-MCNC: 101 MG/DL
HBA1C MFR BLD HPLC: 6.9 %
POTASSIUM SERPL-SCNC: 4.7 MMOL/L
PROT SERPL-MCNC: 7.9 G/DL
SODIUM SERPL-SCNC: 140 MMOL/L
VIT B12 SERPL-MCNC: 704 PG/ML

## 2025-02-05 ENCOUNTER — APPOINTMENT (OUTPATIENT)
Dept: OTOLARYNGOLOGY | Facility: CLINIC | Age: 65
End: 2025-02-05
Payer: MEDICARE

## 2025-02-05 VITALS
HEIGHT: 64 IN | HEART RATE: 84 BPM | BODY MASS INDEX: 28.17 KG/M2 | DIASTOLIC BLOOD PRESSURE: 74 MMHG | SYSTOLIC BLOOD PRESSURE: 114 MMHG | WEIGHT: 165 LBS

## 2025-02-05 DIAGNOSIS — H93.8X3 OTHER SPECIFIED DISORDERS OF EAR, BILATERAL: ICD-10-CM

## 2025-02-05 DIAGNOSIS — H61.23 IMPACTED CERUMEN, BILATERAL: ICD-10-CM

## 2025-02-05 PROCEDURE — 69210 REMOVE IMPACTED EAR WAX UNI: CPT

## 2025-02-05 PROCEDURE — 99203 OFFICE O/P NEW LOW 30 MIN: CPT | Mod: 25

## 2025-02-07 RX ORDER — ESCITALOPRAM OXALATE 5 MG/1
5 TABLET ORAL DAILY
Qty: 60 | Refills: 0 | Status: ACTIVE | COMMUNITY
Start: 2025-02-07 | End: 1900-01-01

## 2025-02-13 ENCOUNTER — EMERGENCY (EMERGENCY)
Facility: HOSPITAL | Age: 65
LOS: 1 days | Discharge: HOME | End: 2025-02-13
Attending: EMERGENCY MEDICINE | Admitting: EMERGENCY MEDICINE
Payer: MEDICARE

## 2025-02-13 ENCOUNTER — APPOINTMENT (OUTPATIENT)
Dept: INTERNAL MEDICINE | Facility: CLINIC | Age: 65
End: 2025-02-13
Payer: MEDICARE

## 2025-02-13 ENCOUNTER — NON-APPOINTMENT (OUTPATIENT)
Age: 65
End: 2025-02-13

## 2025-02-13 VITALS
TEMPERATURE: 98 F | WEIGHT: 164.91 LBS | RESPIRATION RATE: 19 BRPM | HEART RATE: 63 BPM | SYSTOLIC BLOOD PRESSURE: 147 MMHG | OXYGEN SATURATION: 95 % | DIASTOLIC BLOOD PRESSURE: 81 MMHG | HEIGHT: 62 IN

## 2025-02-13 VITALS
DIASTOLIC BLOOD PRESSURE: 80 MMHG | SYSTOLIC BLOOD PRESSURE: 120 MMHG | BODY MASS INDEX: 28.17 KG/M2 | HEART RATE: 73 BPM | RESPIRATION RATE: 15 BRPM | WEIGHT: 165 LBS | TEMPERATURE: 98.4 F | HEIGHT: 64 IN | OXYGEN SATURATION: 96 %

## 2025-02-13 VITALS
TEMPERATURE: 98 F | DIASTOLIC BLOOD PRESSURE: 80 MMHG | HEART RATE: 58 BPM | RESPIRATION RATE: 18 BRPM | SYSTOLIC BLOOD PRESSURE: 126 MMHG | OXYGEN SATURATION: 95 %

## 2025-02-13 DIAGNOSIS — I10 ESSENTIAL (PRIMARY) HYPERTENSION: ICD-10-CM

## 2025-02-13 DIAGNOSIS — Z98.89 OTHER SPECIFIED POSTPROCEDURAL STATES: Chronic | ICD-10-CM

## 2025-02-13 DIAGNOSIS — Z98.51 TUBAL LIGATION STATUS: Chronic | ICD-10-CM

## 2025-02-13 DIAGNOSIS — R06.02 SHORTNESS OF BREATH: ICD-10-CM

## 2025-02-13 LAB
ALBUMIN SERPL ELPH-MCNC: 3.8 G/DL — SIGNIFICANT CHANGE UP (ref 3.3–5)
ALP SERPL-CCNC: 78 U/L — SIGNIFICANT CHANGE UP (ref 40–120)
ALT FLD-CCNC: 22 U/L — SIGNIFICANT CHANGE UP (ref 12–78)
ANION GAP SERPL CALC-SCNC: 10 MMOL/L — SIGNIFICANT CHANGE UP (ref 5–17)
APPEARANCE UR: CLEAR — SIGNIFICANT CHANGE UP
AST SERPL-CCNC: 14 U/L — LOW (ref 15–37)
BASOPHILS # BLD AUTO: 0.04 K/UL — SIGNIFICANT CHANGE UP (ref 0–0.2)
BASOPHILS NFR BLD AUTO: 0.4 % — SIGNIFICANT CHANGE UP (ref 0–2)
BILIRUB SERPL-MCNC: 0.4 MG/DL — SIGNIFICANT CHANGE UP (ref 0.2–1.2)
BILIRUB UR-MCNC: NEGATIVE — SIGNIFICANT CHANGE UP
BUN SERPL-MCNC: 10 MG/DL — SIGNIFICANT CHANGE UP (ref 7–23)
CALCIUM SERPL-MCNC: 9.5 MG/DL — SIGNIFICANT CHANGE UP (ref 8.5–10.1)
CHLORIDE SERPL-SCNC: 105 MMOL/L — SIGNIFICANT CHANGE UP (ref 96–108)
CO2 SERPL-SCNC: 24 MMOL/L — SIGNIFICANT CHANGE UP (ref 22–31)
COLOR SPEC: YELLOW — SIGNIFICANT CHANGE UP
CREAT SERPL-MCNC: 0.59 MG/DL — SIGNIFICANT CHANGE UP (ref 0.5–1.3)
DIFF PNL FLD: NEGATIVE — SIGNIFICANT CHANGE UP
EGFR: 99 ML/MIN/1.73M2 — SIGNIFICANT CHANGE UP
EGFR: 99 ML/MIN/1.73M2 — SIGNIFICANT CHANGE UP
EOSINOPHIL # BLD AUTO: 0.34 K/UL — SIGNIFICANT CHANGE UP (ref 0–0.5)
EOSINOPHIL NFR BLD AUTO: 3.2 % — SIGNIFICANT CHANGE UP (ref 0–6)
GLUCOSE SERPL-MCNC: 128 MG/DL — HIGH (ref 70–99)
GLUCOSE UR QL: NEGATIVE MG/DL — SIGNIFICANT CHANGE UP
HCT VFR BLD CALC: 43.2 % — SIGNIFICANT CHANGE UP (ref 34.5–45)
HGB BLD-MCNC: 14.5 G/DL — SIGNIFICANT CHANGE UP (ref 11.5–15.5)
IMM GRANULOCYTES NFR BLD AUTO: 0.6 % — SIGNIFICANT CHANGE UP (ref 0–0.9)
KETONES UR-MCNC: NEGATIVE MG/DL — SIGNIFICANT CHANGE UP
LEUKOCYTE ESTERASE UR-ACNC: ABNORMAL
LYMPHOCYTES # BLD AUTO: 19.8 % — SIGNIFICANT CHANGE UP (ref 13–44)
LYMPHOCYTES # BLD AUTO: 2.11 K/UL — SIGNIFICANT CHANGE UP (ref 1–3.3)
MAGNESIUM SERPL-MCNC: 2.4 MG/DL — SIGNIFICANT CHANGE UP (ref 1.6–2.6)
MCHC RBC-ENTMCNC: 28.8 PG — SIGNIFICANT CHANGE UP (ref 27–34)
MCHC RBC-ENTMCNC: 33.6 G/DL — SIGNIFICANT CHANGE UP (ref 32–36)
MCV RBC AUTO: 85.7 FL — SIGNIFICANT CHANGE UP (ref 80–100)
MONOCYTES # BLD AUTO: 0.65 K/UL — SIGNIFICANT CHANGE UP (ref 0–0.9)
MONOCYTES NFR BLD AUTO: 6.1 % — SIGNIFICANT CHANGE UP (ref 2–14)
NEUTROPHILS # BLD AUTO: 7.47 K/UL — HIGH (ref 1.8–7.4)
NEUTROPHILS NFR BLD AUTO: 69.9 % — SIGNIFICANT CHANGE UP (ref 43–77)
NITRITE UR-MCNC: POSITIVE
NRBC BLD AUTO-RTO: 0 /100 WBCS — SIGNIFICANT CHANGE UP (ref 0–0)
NT-PROBNP SERPL-SCNC: 70 PG/ML — SIGNIFICANT CHANGE UP (ref 0–125)
PH UR: 7.5 — SIGNIFICANT CHANGE UP (ref 5–8)
PLATELET # BLD AUTO: 361 K/UL — SIGNIFICANT CHANGE UP (ref 150–400)
POTASSIUM SERPL-MCNC: 3.6 MMOL/L — SIGNIFICANT CHANGE UP (ref 3.5–5.3)
POTASSIUM SERPL-SCNC: 3.6 MMOL/L — SIGNIFICANT CHANGE UP (ref 3.5–5.3)
PROT SERPL-MCNC: 7.6 G/DL — SIGNIFICANT CHANGE UP (ref 6–8.3)
PROT UR-MCNC: NEGATIVE MG/DL — SIGNIFICANT CHANGE UP
RBC # BLD: 5.04 M/UL — SIGNIFICANT CHANGE UP (ref 3.8–5.2)
RBC # FLD: 13.9 % — SIGNIFICANT CHANGE UP (ref 10.3–14.5)
SODIUM SERPL-SCNC: 139 MMOL/L — SIGNIFICANT CHANGE UP (ref 135–145)
SP GR SPEC: 1 — SIGNIFICANT CHANGE UP (ref 1–1.03)
TROPONIN I, HIGH SENSITIVITY RESULT: 3.4 NG/L — SIGNIFICANT CHANGE UP
UROBILINOGEN FLD QL: 0.2 MG/DL — SIGNIFICANT CHANGE UP (ref 0.2–1)
WBC # BLD: 10.67 K/UL — HIGH (ref 3.8–10.5)
WBC # FLD AUTO: 10.67 K/UL — HIGH (ref 3.8–10.5)

## 2025-02-13 PROCEDURE — 71045 X-RAY EXAM CHEST 1 VIEW: CPT

## 2025-02-13 PROCEDURE — 87186 SC STD MICRODIL/AGAR DIL: CPT

## 2025-02-13 PROCEDURE — 99285 EMERGENCY DEPT VISIT HI MDM: CPT

## 2025-02-13 PROCEDURE — 36415 COLL VENOUS BLD VENIPUNCTURE: CPT

## 2025-02-13 PROCEDURE — 87086 URINE CULTURE/COLONY COUNT: CPT

## 2025-02-13 PROCEDURE — 99215 OFFICE O/P EST HI 40 MIN: CPT

## 2025-02-13 PROCEDURE — 99285 EMERGENCY DEPT VISIT HI MDM: CPT | Mod: 25

## 2025-02-13 PROCEDURE — 93005 ELECTROCARDIOGRAM TRACING: CPT

## 2025-02-13 PROCEDURE — 70450 CT HEAD/BRAIN W/O DYE: CPT | Mod: MC

## 2025-02-13 PROCEDURE — 83735 ASSAY OF MAGNESIUM: CPT

## 2025-02-13 PROCEDURE — 93010 ELECTROCARDIOGRAM REPORT: CPT

## 2025-02-13 PROCEDURE — 99284 EMERGENCY DEPT VISIT MOD MDM: CPT

## 2025-02-13 PROCEDURE — 93000 ELECTROCARDIOGRAM COMPLETE: CPT

## 2025-02-13 PROCEDURE — 81001 URINALYSIS AUTO W/SCOPE: CPT

## 2025-02-13 PROCEDURE — 83880 ASSAY OF NATRIURETIC PEPTIDE: CPT

## 2025-02-13 PROCEDURE — 84484 ASSAY OF TROPONIN QUANT: CPT

## 2025-02-13 PROCEDURE — 70450 CT HEAD/BRAIN W/O DYE: CPT | Mod: 26

## 2025-02-13 PROCEDURE — 71045 X-RAY EXAM CHEST 1 VIEW: CPT | Mod: 26

## 2025-02-13 PROCEDURE — 85025 COMPLETE CBC W/AUTO DIFF WBC: CPT

## 2025-02-13 PROCEDURE — 80053 COMPREHEN METABOLIC PANEL: CPT

## 2025-02-16 RX ORDER — CEPHALEXIN 250 MG/1
1 CAPSULE ORAL
Qty: 14 | Refills: 0
Start: 2025-02-16 | End: 2025-02-22

## 2025-02-24 ENCOUNTER — APPOINTMENT (OUTPATIENT)
Dept: CARDIOLOGY | Facility: CLINIC | Age: 65
End: 2025-02-24
Payer: MEDICARE

## 2025-02-24 ENCOUNTER — RX RENEWAL (OUTPATIENT)
Age: 65
End: 2025-02-24

## 2025-02-24 ENCOUNTER — NON-APPOINTMENT (OUTPATIENT)
Age: 65
End: 2025-02-24

## 2025-02-24 VITALS
HEIGHT: 64 IN | OXYGEN SATURATION: 97 % | SYSTOLIC BLOOD PRESSURE: 151 MMHG | BODY MASS INDEX: 28.17 KG/M2 | WEIGHT: 165 LBS | DIASTOLIC BLOOD PRESSURE: 82 MMHG | HEART RATE: 75 BPM

## 2025-02-24 DIAGNOSIS — I10 ESSENTIAL (PRIMARY) HYPERTENSION: ICD-10-CM

## 2025-02-24 DIAGNOSIS — I11.9 HYPERTENSIVE HEART DISEASE W/OUT HEART FAILURE: ICD-10-CM

## 2025-02-24 PROCEDURE — 99215 OFFICE O/P EST HI 40 MIN: CPT

## 2025-02-24 PROCEDURE — 93000 ELECTROCARDIOGRAM COMPLETE: CPT

## 2025-02-24 PROCEDURE — G2211 COMPLEX E/M VISIT ADD ON: CPT

## 2025-02-24 RX ORDER — CARVEDILOL 12.5 MG/1
12.5 TABLET, FILM COATED ORAL TWICE DAILY
Qty: 180 | Refills: 2 | Status: ACTIVE | COMMUNITY
Start: 2025-02-24 | End: 1900-01-01

## 2025-02-27 RX ORDER — OLMESARTAN MEDOXOMIL 20 MG/1
20 TABLET, FILM COATED ORAL DAILY
Qty: 90 | Refills: 3 | Status: DISCONTINUED | COMMUNITY
Start: 2025-02-24 | End: 2025-02-27

## 2025-02-27 RX ORDER — AMLODIPINE BESYLATE 5 MG/1
5 TABLET ORAL TWICE DAILY
Qty: 180 | Refills: 3 | Status: ACTIVE | COMMUNITY
Start: 2025-02-24 | End: 1900-01-01

## 2025-02-28 ENCOUNTER — APPOINTMENT (OUTPATIENT)
Dept: CARDIOLOGY | Facility: CLINIC | Age: 65
End: 2025-02-28
Payer: MEDICARE

## 2025-02-28 PROCEDURE — 93306 TTE W/DOPPLER COMPLETE: CPT

## 2025-02-28 PROCEDURE — 95800 SLP STDY UNATTENDED: CPT | Mod: TC

## 2025-03-05 ENCOUNTER — RX RENEWAL (OUTPATIENT)
Age: 65
End: 2025-03-05

## 2025-03-18 ENCOUNTER — APPOINTMENT (OUTPATIENT)
Dept: DERMATOLOGY | Facility: CLINIC | Age: 65
End: 2025-03-18
Payer: MEDICARE

## 2025-03-18 DIAGNOSIS — L85.3 XEROSIS CUTIS: ICD-10-CM

## 2025-03-18 DIAGNOSIS — L82.1 OTHER SEBORRHEIC KERATOSIS: ICD-10-CM

## 2025-03-18 PROCEDURE — 99203 OFFICE O/P NEW LOW 30 MIN: CPT

## 2025-03-26 ENCOUNTER — RX RENEWAL (OUTPATIENT)
Age: 65
End: 2025-03-26

## 2025-03-26 RX ORDER — UBIDECARENONE/VIT E ACET 100MG-5
CAPSULE ORAL
Refills: 0 | Status: ACTIVE | COMMUNITY

## 2025-04-07 NOTE — ED PROVIDER NOTE - WET READ LAUNCH FT
Large Joint Aspiration/Injection: R greater trochanteric bursa    Date/Time: 4/1/2025 1:30 PM    Performed by: Syed Jauregui MD  Authorized by: Syed Jauregui MD    Consent Done?:  Yes (Verbal)  Indications:  Pain  Timeout: prior to procedure the correct patient, procedure, and site was verified    Prep: patient was prepped and draped in usual sterile fashion      Local anesthesia used?: Yes    Local anesthetic:  Lidocaine 1% without epinephrine  Anesthetic total (ml):  5      Details:  Needle Size:  22 G  Approach:  Lateral  Location:  Hip  Site:  R greater trochanteric bursa  Medications:  40 mg triamcinolone acetonide 40 mg/mL  Patient tolerance:  Patient tolerated the procedure well with no immediate complications    
There is 1 Wet Read(s) to document.

## 2025-04-29 ENCOUNTER — RX RENEWAL (OUTPATIENT)
Age: 65
End: 2025-04-29

## 2025-04-30 ENCOUNTER — RX RENEWAL (OUTPATIENT)
Age: 65
End: 2025-04-30

## 2025-05-02 ENCOUNTER — NON-APPOINTMENT (OUTPATIENT)
Age: 65
End: 2025-05-02

## 2025-05-14 NOTE — ED PROVIDER NOTE - PMH
Asthma    Calculus of kidney    DM (diabetes mellitus)    GERD (gastroesophageal reflux disease)    Herpes simplex    HLD (hyperlipidemia)    Hydronephrosis  right  Hydronephrosis, unspecified hydronephrosis type    Hypertension    Impaired fasting glucose    Thyroid activity decreased done

## 2025-05-29 ENCOUNTER — APPOINTMENT (OUTPATIENT)
Dept: INTERNAL MEDICINE | Facility: CLINIC | Age: 65
End: 2025-05-29
Payer: MEDICARE

## 2025-05-29 ENCOUNTER — LABORATORY RESULT (OUTPATIENT)
Age: 65
End: 2025-05-29

## 2025-05-29 VITALS
HEART RATE: 73 BPM | OXYGEN SATURATION: 98 % | WEIGHT: 162 LBS | RESPIRATION RATE: 15 BRPM | BODY MASS INDEX: 27.66 KG/M2 | SYSTOLIC BLOOD PRESSURE: 128 MMHG | HEIGHT: 64 IN | TEMPERATURE: 98.6 F | DIASTOLIC BLOOD PRESSURE: 68 MMHG

## 2025-05-29 DIAGNOSIS — R39.9 UNSPECIFIED SYMPTOMS AND SIGNS INVOLVING THE GENITOURINARY SYSTEM: ICD-10-CM

## 2025-05-29 LAB
APPEARANCE: CLEAR
BILIRUB UR QL STRIP: NORMAL
BILIRUBIN URINE: NEGATIVE
BLOOD URINE: NEGATIVE
CLARITY UR: NORMAL
COLOR: YELLOW
GLUCOSE QUALITATIVE U: NEGATIVE MG/DL
GLUCOSE UR-MCNC: NORMAL
HCG UR QL: 0.2 EU/DL
HGB UR QL STRIP.AUTO: NORMAL
KETONES UR-MCNC: NORMAL
KETONES URINE: ABNORMAL MG/DL
LEUKOCYTE ESTERASE UR QL STRIP: NORMAL
LEUKOCYTE ESTERASE URINE: ABNORMAL
NITRITE UR QL STRIP: NORMAL
NITRITE URINE: POSITIVE
PH UR STRIP: 6.5
PH URINE: 6.5
PROT UR STRIP-MCNC: NORMAL
PROTEIN URINE: 100 MG/DL
SP GR UR STRIP: 1.02
SPECIFIC GRAVITY URINE: 1.02
UROBILINOGEN URINE: 0.2 MG/DL

## 2025-05-29 PROCEDURE — 99213 OFFICE O/P EST LOW 20 MIN: CPT

## 2025-05-29 PROCEDURE — 81003 URINALYSIS AUTO W/O SCOPE: CPT | Mod: QW

## 2025-06-23 ENCOUNTER — RX RENEWAL (OUTPATIENT)
Age: 65
End: 2025-06-23

## 2025-07-28 ENCOUNTER — RX RENEWAL (OUTPATIENT)
Age: 65
End: 2025-07-28

## 2025-07-30 ENCOUNTER — APPOINTMENT (OUTPATIENT)
Dept: INTERNAL MEDICINE | Facility: CLINIC | Age: 65
End: 2025-07-30
Payer: MEDICARE

## 2025-07-30 VITALS
BODY MASS INDEX: 31.15 KG/M2 | TEMPERATURE: 98.4 F | DIASTOLIC BLOOD PRESSURE: 66 MMHG | RESPIRATION RATE: 15 BRPM | WEIGHT: 165 LBS | OXYGEN SATURATION: 96 % | HEIGHT: 61 IN | HEART RATE: 76 BPM | SYSTOLIC BLOOD PRESSURE: 124 MMHG

## 2025-07-30 DIAGNOSIS — R39.9 UNSPECIFIED SYMPTOMS AND SIGNS INVOLVING THE GENITOURINARY SYSTEM: ICD-10-CM

## 2025-07-30 DIAGNOSIS — I10 ESSENTIAL (PRIMARY) HYPERTENSION: ICD-10-CM

## 2025-07-30 PROCEDURE — 99213 OFFICE O/P EST LOW 20 MIN: CPT

## 2025-07-31 LAB
ALBUMIN SERPL ELPH-MCNC: 4.7 G/DL
ALP BLD-CCNC: 82 U/L
ALT SERPL-CCNC: 25 U/L
ANION GAP SERPL CALC-SCNC: 15 MMOL/L
APPEARANCE: CLEAR
AST SERPL-CCNC: 27 U/L
BACTERIA: NEGATIVE /HPF
BASOPHILS # BLD AUTO: 0.04 K/UL
BASOPHILS NFR BLD AUTO: 0.3 %
BILIRUB SERPL-MCNC: 0.3 MG/DL
BILIRUBIN URINE: NEGATIVE
BLOOD URINE: NEGATIVE
BUN SERPL-MCNC: 10 MG/DL
CALCIUM SERPL-MCNC: 10.1 MG/DL
CAST: 0 /LPF
CHLORIDE SERPL-SCNC: 103 MMOL/L
CO2 SERPL-SCNC: 22 MMOL/L
COLOR: YELLOW
CREAT SERPL-MCNC: 0.51 MG/DL
EGFRCR SERPLBLD CKD-EPI 2021: 102 ML/MIN/1.73M2
EOSINOPHIL # BLD AUTO: 0.39 K/UL
EOSINOPHIL NFR BLD AUTO: 3.2 %
EPITHELIAL CELLS: 0 /HPF
GLUCOSE QUALITATIVE U: NEGATIVE MG/DL
GLUCOSE SERPL-MCNC: 102 MG/DL
HCT VFR BLD CALC: 42.2 %
HGB BLD-MCNC: 13.1 G/DL
IMM GRANULOCYTES NFR BLD AUTO: 0.5 %
KETONES URINE: NEGATIVE MG/DL
LEUKOCYTE ESTERASE URINE: ABNORMAL
LYMPHOCYTES # BLD AUTO: 2.55 K/UL
LYMPHOCYTES NFR BLD AUTO: 20.6 %
MAN DIFF?: NORMAL
MCHC RBC-ENTMCNC: 27.9 PG
MCHC RBC-ENTMCNC: 31 G/DL
MCV RBC AUTO: 89.8 FL
MICROSCOPIC-UA: NORMAL
MONOCYTES # BLD AUTO: 0.7 K/UL
MONOCYTES NFR BLD AUTO: 5.7 %
NEUTROPHILS # BLD AUTO: 8.61 K/UL
NEUTROPHILS NFR BLD AUTO: 69.7 %
NITRITE URINE: NEGATIVE
PH URINE: 7
PLATELET # BLD AUTO: 355 K/UL
POTASSIUM SERPL-SCNC: 4.1 MMOL/L
PROT SERPL-MCNC: 7.7 G/DL
PROTEIN URINE: NEGATIVE MG/DL
RBC # BLD: 4.7 M/UL
RBC # FLD: 15.1 %
RED BLOOD CELLS URINE: 0 /HPF
SODIUM SERPL-SCNC: 140 MMOL/L
SPECIFIC GRAVITY URINE: 1.01
UROBILINOGEN URINE: 0.2 MG/DL
WBC # FLD AUTO: 12.35 K/UL
WHITE BLOOD CELLS URINE: 5 /HPF

## 2025-08-01 RX ORDER — SULFAMETHOXAZOLE AND TRIMETHOPRIM 800; 160 MG/1; MG/1
800-160 TABLET ORAL TWICE DAILY
Qty: 14 | Refills: 0 | Status: ACTIVE | COMMUNITY
Start: 2025-08-01 | End: 1900-01-01

## 2025-08-04 LAB — BACTERIA UR CULT: ABNORMAL

## 2025-08-06 RX ORDER — ESCITALOPRAM OXALATE 10 MG/1
10 TABLET ORAL
Qty: 30 | Refills: 0 | Status: ACTIVE | COMMUNITY
Start: 2025-08-05 | End: 1900-01-01

## 2025-08-07 DIAGNOSIS — E55.9 VITAMIN D DEFICIENCY, UNSPECIFIED: ICD-10-CM

## 2025-08-08 LAB — 25(OH)D3 SERPL-MCNC: 21.9 NG/ML

## 2025-08-14 ENCOUNTER — EMERGENCY (EMERGENCY)
Facility: HOSPITAL | Age: 65
LOS: 1 days | End: 2025-08-14
Attending: STUDENT IN AN ORGANIZED HEALTH CARE EDUCATION/TRAINING PROGRAM | Admitting: STUDENT IN AN ORGANIZED HEALTH CARE EDUCATION/TRAINING PROGRAM
Payer: MEDICARE

## 2025-08-14 VITALS
SYSTOLIC BLOOD PRESSURE: 137 MMHG | HEART RATE: 64 BPM | HEIGHT: 61 IN | DIASTOLIC BLOOD PRESSURE: 74 MMHG | WEIGHT: 160.06 LBS | TEMPERATURE: 98 F | OXYGEN SATURATION: 96 % | RESPIRATION RATE: 20 BRPM

## 2025-08-14 VITALS — DIASTOLIC BLOOD PRESSURE: 70 MMHG | HEART RATE: 61 BPM | SYSTOLIC BLOOD PRESSURE: 130 MMHG | RESPIRATION RATE: 16 BRPM

## 2025-08-14 DIAGNOSIS — Z98.89 OTHER SPECIFIED POSTPROCEDURAL STATES: Chronic | ICD-10-CM

## 2025-08-14 DIAGNOSIS — Z98.51 TUBAL LIGATION STATUS: Chronic | ICD-10-CM

## 2025-08-14 LAB
ALBUMIN SERPL ELPH-MCNC: 3.9 G/DL — SIGNIFICANT CHANGE UP (ref 3.3–5)
ALP SERPL-CCNC: 65 U/L — SIGNIFICANT CHANGE UP (ref 40–120)
ALT FLD-CCNC: 21 U/L — SIGNIFICANT CHANGE UP (ref 12–78)
ANION GAP SERPL CALC-SCNC: 8 MMOL/L — SIGNIFICANT CHANGE UP (ref 5–17)
AST SERPL-CCNC: 13 U/L — LOW (ref 15–37)
BASOPHILS # BLD AUTO: 0.04 K/UL — SIGNIFICANT CHANGE UP (ref 0–0.2)
BASOPHILS NFR BLD AUTO: 0.4 % — SIGNIFICANT CHANGE UP (ref 0–2)
BILIRUB SERPL-MCNC: 0.5 MG/DL — SIGNIFICANT CHANGE UP (ref 0.2–1.2)
BUN SERPL-MCNC: 7 MG/DL — SIGNIFICANT CHANGE UP (ref 7–23)
CALCIUM SERPL-MCNC: 9.6 MG/DL — SIGNIFICANT CHANGE UP (ref 8.5–10.1)
CHLORIDE SERPL-SCNC: 105 MMOL/L — SIGNIFICANT CHANGE UP (ref 96–108)
CO2 SERPL-SCNC: 26 MMOL/L — SIGNIFICANT CHANGE UP (ref 22–31)
CREAT SERPL-MCNC: 0.46 MG/DL — LOW (ref 0.5–1.3)
D DIMER BLD IA.RAPID-MCNC: <150 NG/ML DDU — SIGNIFICANT CHANGE UP
EGFR: 105 ML/MIN/1.73M2 — SIGNIFICANT CHANGE UP
EGFR: 105 ML/MIN/1.73M2 — SIGNIFICANT CHANGE UP
EOSINOPHIL # BLD AUTO: 0.4 K/UL — SIGNIFICANT CHANGE UP (ref 0–0.5)
EOSINOPHIL NFR BLD AUTO: 4.1 % — SIGNIFICANT CHANGE UP (ref 0–6)
GLUCOSE SERPL-MCNC: 121 MG/DL — HIGH (ref 70–99)
HCT VFR BLD CALC: 38.5 % — SIGNIFICANT CHANGE UP (ref 34.5–45)
HGB BLD-MCNC: 13 G/DL — SIGNIFICANT CHANGE UP (ref 11.5–15.5)
IMM GRANULOCYTES # BLD AUTO: 0.03 K/UL — SIGNIFICANT CHANGE UP (ref 0–0.07)
IMM GRANULOCYTES NFR BLD AUTO: 0.3 % — SIGNIFICANT CHANGE UP (ref 0–0.9)
LIDOCAIN IGE QN: 46 U/L — SIGNIFICANT CHANGE UP (ref 13–75)
LYMPHOCYTES # BLD AUTO: 2.05 K/UL — SIGNIFICANT CHANGE UP (ref 1–3.3)
LYMPHOCYTES NFR BLD AUTO: 20.8 % — SIGNIFICANT CHANGE UP (ref 13–44)
MCHC RBC-ENTMCNC: 28.6 PG — SIGNIFICANT CHANGE UP (ref 27–34)
MCHC RBC-ENTMCNC: 33.8 G/DL — SIGNIFICANT CHANGE UP (ref 32–36)
MCV RBC AUTO: 84.6 FL — SIGNIFICANT CHANGE UP (ref 80–100)
MONOCYTES # BLD AUTO: 0.7 K/UL — SIGNIFICANT CHANGE UP (ref 0–0.9)
MONOCYTES NFR BLD AUTO: 7.1 % — SIGNIFICANT CHANGE UP (ref 2–14)
NEUTROPHILS # BLD AUTO: 6.63 K/UL — SIGNIFICANT CHANGE UP (ref 1.8–7.4)
NEUTROPHILS NFR BLD AUTO: 67.3 % — SIGNIFICANT CHANGE UP (ref 43–77)
NRBC # BLD AUTO: 0 K/UL — SIGNIFICANT CHANGE UP (ref 0–0)
NRBC # FLD: 0 K/UL — SIGNIFICANT CHANGE UP (ref 0–0)
NRBC BLD AUTO-RTO: 0 /100 WBCS — SIGNIFICANT CHANGE UP (ref 0–0)
PLATELET # BLD AUTO: 327 K/UL — SIGNIFICANT CHANGE UP (ref 150–400)
PMV BLD: 9 FL — SIGNIFICANT CHANGE UP (ref 7–13)
POTASSIUM SERPL-MCNC: 3.7 MMOL/L — SIGNIFICANT CHANGE UP (ref 3.5–5.3)
POTASSIUM SERPL-SCNC: 3.7 MMOL/L — SIGNIFICANT CHANGE UP (ref 3.5–5.3)
PROT SERPL-MCNC: 7.6 G/DL — SIGNIFICANT CHANGE UP (ref 6–8.3)
RBC # BLD: 4.55 M/UL — SIGNIFICANT CHANGE UP (ref 3.8–5.2)
RBC # FLD: 14.1 % — SIGNIFICANT CHANGE UP (ref 10.3–14.5)
SODIUM SERPL-SCNC: 139 MMOL/L — SIGNIFICANT CHANGE UP (ref 135–145)
TROPONIN I, HIGH SENSITIVITY RESULT: 3 NG/L — SIGNIFICANT CHANGE UP
WBC # BLD: 9.85 K/UL — SIGNIFICANT CHANGE UP (ref 3.8–10.5)
WBC # FLD AUTO: 9.85 K/UL — SIGNIFICANT CHANGE UP (ref 3.8–10.5)

## 2025-08-14 PROCEDURE — 71045 X-RAY EXAM CHEST 1 VIEW: CPT

## 2025-08-14 PROCEDURE — 93005 ELECTROCARDIOGRAM TRACING: CPT

## 2025-08-14 PROCEDURE — 71045 X-RAY EXAM CHEST 1 VIEW: CPT | Mod: 26

## 2025-08-14 PROCEDURE — 84484 ASSAY OF TROPONIN QUANT: CPT

## 2025-08-14 PROCEDURE — 99285 EMERGENCY DEPT VISIT HI MDM: CPT | Mod: 25

## 2025-08-14 PROCEDURE — 83690 ASSAY OF LIPASE: CPT

## 2025-08-14 PROCEDURE — 99285 EMERGENCY DEPT VISIT HI MDM: CPT

## 2025-08-14 PROCEDURE — 93010 ELECTROCARDIOGRAM REPORT: CPT

## 2025-08-14 PROCEDURE — 85379 FIBRIN DEGRADATION QUANT: CPT

## 2025-08-14 PROCEDURE — 80053 COMPREHEN METABOLIC PANEL: CPT

## 2025-08-14 PROCEDURE — 85025 COMPLETE CBC W/AUTO DIFF WBC: CPT

## 2025-08-14 PROCEDURE — 36415 COLL VENOUS BLD VENIPUNCTURE: CPT

## 2025-09-10 ENCOUNTER — RX RENEWAL (OUTPATIENT)
Age: 65
End: 2025-09-10

## 2025-09-17 ENCOUNTER — APPOINTMENT (OUTPATIENT)
Dept: INTERNAL MEDICINE | Facility: CLINIC | Age: 65
End: 2025-09-17
Payer: MEDICARE

## 2025-09-17 VITALS
TEMPERATURE: 98 F | BODY MASS INDEX: 31.15 KG/M2 | DIASTOLIC BLOOD PRESSURE: 80 MMHG | SYSTOLIC BLOOD PRESSURE: 120 MMHG | HEIGHT: 61 IN | HEART RATE: 70 BPM | OXYGEN SATURATION: 97 % | WEIGHT: 165 LBS

## 2025-09-17 DIAGNOSIS — M62.830 MUSCLE SPASM OF BACK: ICD-10-CM

## 2025-09-17 PROCEDURE — 99213 OFFICE O/P EST LOW 20 MIN: CPT

## 2025-09-17 RX ORDER — CYCLOBENZAPRINE HYDROCHLORIDE 5 MG/1
5 TABLET, FILM COATED ORAL
Qty: 10 | Refills: 0 | Status: ACTIVE | COMMUNITY
Start: 2025-09-17 | End: 1900-01-01

## 2025-09-18 ENCOUNTER — RX RENEWAL (OUTPATIENT)
Age: 65
End: 2025-09-18